# Patient Record
Sex: MALE | Race: WHITE | NOT HISPANIC OR LATINO | URBAN - METROPOLITAN AREA
[De-identification: names, ages, dates, MRNs, and addresses within clinical notes are randomized per-mention and may not be internally consistent; named-entity substitution may affect disease eponyms.]

---

## 2018-12-16 ENCOUNTER — INPATIENT (INPATIENT)
Facility: HOSPITAL | Age: 73
LOS: 6 days | Discharge: HOME | DRG: 871 | End: 2018-12-23
Attending: SURGERY | Admitting: SURGERY
Payer: MEDICARE

## 2018-12-16 VITALS
HEIGHT: 70 IN | TEMPERATURE: 98 F | HEART RATE: 82 BPM | OXYGEN SATURATION: 96 % | WEIGHT: 199.96 LBS | RESPIRATION RATE: 18 BRPM | SYSTOLIC BLOOD PRESSURE: 117 MMHG | DIASTOLIC BLOOD PRESSURE: 74 MMHG

## 2018-12-16 DIAGNOSIS — Z95.1 PRESENCE OF AORTOCORONARY BYPASS GRAFT: Chronic | ICD-10-CM

## 2018-12-16 DIAGNOSIS — Z90.49 ACQUIRED ABSENCE OF OTHER SPECIFIED PARTS OF DIGESTIVE TRACT: Chronic | ICD-10-CM

## 2018-12-16 DIAGNOSIS — K81.9 CHOLECYSTITIS, UNSPECIFIED: ICD-10-CM

## 2018-12-16 LAB
ALBUMIN SERPL ELPH-MCNC: 3.8 G/DL — SIGNIFICANT CHANGE UP (ref 3.3–5)
ALP SERPL-CCNC: 107 U/L — SIGNIFICANT CHANGE UP (ref 40–120)
ALT FLD-CCNC: 27 U/L — SIGNIFICANT CHANGE UP (ref 10–45)
ANION GAP SERPL CALC-SCNC: 17 MMOL/L — SIGNIFICANT CHANGE UP (ref 5–17)
APPEARANCE UR: CLEAR — SIGNIFICANT CHANGE UP
APTT BLD: 27 SEC — LOW (ref 27.5–36.3)
AST SERPL-CCNC: 24 U/L — SIGNIFICANT CHANGE UP (ref 10–40)
BACTERIA # UR AUTO: NEGATIVE — SIGNIFICANT CHANGE UP
BASE EXCESS BLDV CALC-SCNC: 1.2 MMOL/L — SIGNIFICANT CHANGE UP (ref -2–2)
BASOPHILS # BLD AUTO: 0.02 K/UL — SIGNIFICANT CHANGE UP (ref 0–0.2)
BASOPHILS NFR BLD AUTO: 0.1 % — SIGNIFICANT CHANGE UP (ref 0–2)
BILIRUB SERPL-MCNC: 0.7 MG/DL — SIGNIFICANT CHANGE UP (ref 0.2–1.2)
BILIRUB UR-MCNC: NEGATIVE — SIGNIFICANT CHANGE UP
BLD GP AB SCN SERPL QL: NEGATIVE — SIGNIFICANT CHANGE UP
BUN SERPL-MCNC: 10 MG/DL — SIGNIFICANT CHANGE UP (ref 7–23)
CA-I SERPL-SCNC: 1.13 MMOL/L — SIGNIFICANT CHANGE UP (ref 1.12–1.3)
CALCIUM SERPL-MCNC: 9.2 MG/DL — SIGNIFICANT CHANGE UP (ref 8.4–10.5)
CHLORIDE BLDV-SCNC: 103 MMOL/L — SIGNIFICANT CHANGE UP (ref 96–108)
CHLORIDE SERPL-SCNC: 98 MMOL/L — SIGNIFICANT CHANGE UP (ref 96–108)
CO2 BLDV-SCNC: 27 MMOL/L — SIGNIFICANT CHANGE UP (ref 22–30)
CO2 SERPL-SCNC: 21 MMOL/L — LOW (ref 22–31)
COLOR SPEC: SIGNIFICANT CHANGE UP
CREAT SERPL-MCNC: 1.1 MG/DL — SIGNIFICANT CHANGE UP (ref 0.5–1.3)
DIFF PNL FLD: NEGATIVE — SIGNIFICANT CHANGE UP
EOSINOPHIL # BLD AUTO: 0.03 K/UL — SIGNIFICANT CHANGE UP (ref 0–0.5)
EOSINOPHIL NFR BLD AUTO: 0.2 % — SIGNIFICANT CHANGE UP (ref 0–6)
EPI CELLS # UR: 0 /HPF — SIGNIFICANT CHANGE UP
GAS PNL BLDV: 132 MMOL/L — LOW (ref 136–145)
GAS PNL BLDV: SIGNIFICANT CHANGE UP
GLUCOSE BLDC GLUCOMTR-MCNC: 116 MG/DL — HIGH (ref 70–99)
GLUCOSE BLDV-MCNC: 115 MG/DL — HIGH (ref 70–99)
GLUCOSE SERPL-MCNC: 123 MG/DL — HIGH (ref 70–99)
GLUCOSE UR QL: NEGATIVE — SIGNIFICANT CHANGE UP
HCO3 BLDV-SCNC: 26 MMOL/L — SIGNIFICANT CHANGE UP (ref 21–29)
HCT VFR BLD CALC: 42.7 % — SIGNIFICANT CHANGE UP (ref 39–50)
HCT VFR BLDA CALC: 48 % — SIGNIFICANT CHANGE UP (ref 39–50)
HGB BLD CALC-MCNC: 15.8 G/DL — SIGNIFICANT CHANGE UP (ref 13–17)
HGB BLD-MCNC: 15 G/DL — SIGNIFICANT CHANGE UP (ref 13–17)
HYALINE CASTS # UR AUTO: 0 /LPF — SIGNIFICANT CHANGE UP (ref 0–2)
IMM GRANULOCYTES NFR BLD AUTO: 0.4 % — SIGNIFICANT CHANGE UP (ref 0–1.5)
INR BLD: 1.11 RATIO — SIGNIFICANT CHANGE UP (ref 0.88–1.16)
KETONES UR-MCNC: NEGATIVE — SIGNIFICANT CHANGE UP
LACTATE BLDV-MCNC: 2.7 MMOL/L — HIGH (ref 0.7–2)
LEUKOCYTE ESTERASE UR-ACNC: NEGATIVE — SIGNIFICANT CHANGE UP
LIDOCAIN IGE QN: 33 U/L — SIGNIFICANT CHANGE UP (ref 7–60)
LYMPHOCYTES # BLD AUTO: 1.1 K/UL — SIGNIFICANT CHANGE UP (ref 1–3.3)
LYMPHOCYTES # BLD AUTO: 5.6 % — LOW (ref 13–44)
MCHC RBC-ENTMCNC: 29.5 PG — SIGNIFICANT CHANGE UP (ref 27–34)
MCHC RBC-ENTMCNC: 35.1 GM/DL — SIGNIFICANT CHANGE UP (ref 32–36)
MCV RBC AUTO: 84.1 FL — SIGNIFICANT CHANGE UP (ref 80–100)
MONOCYTES # BLD AUTO: 0.92 K/UL — HIGH (ref 0–0.9)
MONOCYTES NFR BLD AUTO: 4.7 % — SIGNIFICANT CHANGE UP (ref 2–14)
NEUTROPHILS # BLD AUTO: 17.45 K/UL — HIGH (ref 1.8–7.4)
NEUTROPHILS NFR BLD AUTO: 89 % — HIGH (ref 43–77)
NITRITE UR-MCNC: NEGATIVE — SIGNIFICANT CHANGE UP
OTHER CELLS CSF MANUAL: 4 ML/DL — LOW (ref 18–22)
PCO2 BLDV: 42 MMHG — SIGNIFICANT CHANGE UP (ref 35–50)
PH BLDV: 7.41 — SIGNIFICANT CHANGE UP (ref 7.35–7.45)
PH UR: 7 — SIGNIFICANT CHANGE UP (ref 5–8)
PLATELET # BLD AUTO: 324 K/UL — SIGNIFICANT CHANGE UP (ref 150–400)
PO2 BLDV: <20 MMHG — LOW (ref 25–45)
POTASSIUM BLDV-SCNC: 3.5 MMOL/L — SIGNIFICANT CHANGE UP (ref 3.5–5.3)
POTASSIUM SERPL-MCNC: 4.1 MMOL/L — SIGNIFICANT CHANGE UP (ref 3.5–5.3)
POTASSIUM SERPL-SCNC: 4.1 MMOL/L — SIGNIFICANT CHANGE UP (ref 3.5–5.3)
PROT SERPL-MCNC: 7.9 G/DL — SIGNIFICANT CHANGE UP (ref 6–8.3)
PROT UR-MCNC: NEGATIVE — SIGNIFICANT CHANGE UP
PROTHROM AB SERPL-ACNC: 12.8 SEC — SIGNIFICANT CHANGE UP (ref 10–12.9)
RBC # BLD: 5.08 M/UL — SIGNIFICANT CHANGE UP (ref 4.2–5.8)
RBC # FLD: 13.6 % — SIGNIFICANT CHANGE UP (ref 10.3–14.5)
RBC CASTS # UR COMP ASSIST: 1 /HPF — SIGNIFICANT CHANGE UP (ref 0–4)
RH IG SCN BLD-IMP: POSITIVE — SIGNIFICANT CHANGE UP
RH IG SCN BLD-IMP: POSITIVE — SIGNIFICANT CHANGE UP
SAO2 % BLDV: 20 % — LOW (ref 67–88)
SODIUM SERPL-SCNC: 136 MMOL/L — SIGNIFICANT CHANGE UP (ref 135–145)
SP GR SPEC: 1.02 — SIGNIFICANT CHANGE UP (ref 1.01–1.02)
UROBILINOGEN FLD QL: NEGATIVE — SIGNIFICANT CHANGE UP
WBC # BLD: 19.59 K/UL — HIGH (ref 3.8–10.5)
WBC # FLD AUTO: 19.59 K/UL — HIGH (ref 3.8–10.5)
WBC UR QL: 0 /HPF — SIGNIFICANT CHANGE UP (ref 0–5)

## 2018-12-16 PROCEDURE — 76705 ECHO EXAM OF ABDOMEN: CPT | Mod: 26

## 2018-12-16 PROCEDURE — 71045 X-RAY EXAM CHEST 1 VIEW: CPT | Mod: 26

## 2018-12-16 PROCEDURE — 99222 1ST HOSP IP/OBS MODERATE 55: CPT

## 2018-12-16 PROCEDURE — 99285 EMERGENCY DEPT VISIT HI MDM: CPT | Mod: 25,GC

## 2018-12-16 PROCEDURE — 31500 INSERT EMERGENCY AIRWAY: CPT | Mod: GC

## 2018-12-16 PROCEDURE — 76705 ECHO EXAM OF ABDOMEN: CPT | Mod: 26,RT

## 2018-12-16 RX ORDER — SODIUM CHLORIDE 9 MG/ML
500 INJECTION INTRAMUSCULAR; INTRAVENOUS; SUBCUTANEOUS ONCE
Qty: 0 | Refills: 0 | Status: COMPLETED | OUTPATIENT
Start: 2018-12-16 | End: 2018-12-16

## 2018-12-16 RX ORDER — HEPARIN SODIUM 5000 [USP'U]/ML
5000 INJECTION INTRAVENOUS; SUBCUTANEOUS EVERY 8 HOURS
Qty: 0 | Refills: 0 | Status: DISCONTINUED | OUTPATIENT
Start: 2018-12-16 | End: 2018-12-18

## 2018-12-16 RX ORDER — ACETAMINOPHEN 500 MG
650 TABLET ORAL EVERY 6 HOURS
Qty: 0 | Refills: 0 | Status: DISCONTINUED | OUTPATIENT
Start: 2018-12-16 | End: 2018-12-17

## 2018-12-16 RX ORDER — DEXTROSE 50 % IN WATER 50 %
15 SYRINGE (ML) INTRAVENOUS ONCE
Qty: 0 | Refills: 0 | Status: DISCONTINUED | OUTPATIENT
Start: 2018-12-16 | End: 2018-12-17

## 2018-12-16 RX ORDER — GLUCAGON INJECTION, SOLUTION 0.5 MG/.1ML
1 INJECTION, SOLUTION SUBCUTANEOUS ONCE
Qty: 0 | Refills: 0 | Status: DISCONTINUED | OUTPATIENT
Start: 2018-12-16 | End: 2018-12-17

## 2018-12-16 RX ORDER — DEXTROSE 50 % IN WATER 50 %
25 SYRINGE (ML) INTRAVENOUS ONCE
Qty: 0 | Refills: 0 | Status: DISCONTINUED | OUTPATIENT
Start: 2018-12-16 | End: 2018-12-17

## 2018-12-16 RX ORDER — SODIUM CHLORIDE 9 MG/ML
1000 INJECTION, SOLUTION INTRAVENOUS ONCE
Qty: 0 | Refills: 0 | Status: COMPLETED | OUTPATIENT
Start: 2018-12-16 | End: 2018-12-16

## 2018-12-16 RX ORDER — DEXTROSE 50 % IN WATER 50 %
12.5 SYRINGE (ML) INTRAVENOUS ONCE
Qty: 0 | Refills: 0 | Status: DISCONTINUED | OUTPATIENT
Start: 2018-12-16 | End: 2018-12-17

## 2018-12-16 RX ORDER — PIPERACILLIN AND TAZOBACTAM 4; .5 G/20ML; G/20ML
3.38 INJECTION, POWDER, LYOPHILIZED, FOR SOLUTION INTRAVENOUS EVERY 8 HOURS
Qty: 0 | Refills: 0 | Status: DISCONTINUED | OUTPATIENT
Start: 2018-12-16 | End: 2018-12-20

## 2018-12-16 RX ORDER — INSULIN LISPRO 100/ML
VIAL (ML) SUBCUTANEOUS EVERY 6 HOURS
Qty: 0 | Refills: 0 | Status: DISCONTINUED | OUTPATIENT
Start: 2018-12-16 | End: 2018-12-20

## 2018-12-16 RX ORDER — METOPROLOL TARTRATE 50 MG
5 TABLET ORAL EVERY 6 HOURS
Qty: 0 | Refills: 0 | Status: DISCONTINUED | OUTPATIENT
Start: 2018-12-16 | End: 2018-12-17

## 2018-12-16 RX ORDER — PIPERACILLIN AND TAZOBACTAM 4; .5 G/20ML; G/20ML
3.38 INJECTION, POWDER, LYOPHILIZED, FOR SOLUTION INTRAVENOUS ONCE
Qty: 0 | Refills: 0 | Status: COMPLETED | OUTPATIENT
Start: 2018-12-16 | End: 2018-12-16

## 2018-12-16 RX ORDER — SODIUM CHLORIDE 9 MG/ML
1000 INJECTION, SOLUTION INTRAVENOUS
Qty: 0 | Refills: 0 | Status: DISCONTINUED | OUTPATIENT
Start: 2018-12-16 | End: 2018-12-18

## 2018-12-16 RX ORDER — SODIUM CHLORIDE 9 MG/ML
1000 INJECTION, SOLUTION INTRAVENOUS
Qty: 0 | Refills: 0 | Status: DISCONTINUED | OUTPATIENT
Start: 2018-12-16 | End: 2018-12-17

## 2018-12-16 RX ADMIN — PIPERACILLIN AND TAZOBACTAM 25 GRAM(S): 4; .5 INJECTION, POWDER, LYOPHILIZED, FOR SOLUTION INTRAVENOUS at 22:50

## 2018-12-16 RX ADMIN — SODIUM CHLORIDE 500 MILLILITER(S): 9 INJECTION INTRAMUSCULAR; INTRAVENOUS; SUBCUTANEOUS at 13:17

## 2018-12-16 RX ADMIN — SODIUM CHLORIDE 1000 MILLILITER(S): 9 INJECTION, SOLUTION INTRAVENOUS at 14:59

## 2018-12-16 RX ADMIN — PIPERACILLIN AND TAZOBACTAM 3.38 GRAM(S): 4; .5 INJECTION, POWDER, LYOPHILIZED, FOR SOLUTION INTRAVENOUS at 14:59

## 2018-12-16 RX ADMIN — PIPERACILLIN AND TAZOBACTAM 200 GRAM(S): 4; .5 INJECTION, POWDER, LYOPHILIZED, FOR SOLUTION INTRAVENOUS at 14:02

## 2018-12-16 RX ADMIN — HEPARIN SODIUM 5000 UNIT(S): 5000 INJECTION INTRAVENOUS; SUBCUTANEOUS at 22:51

## 2018-12-16 RX ADMIN — Medication 650 MILLIGRAM(S): at 19:55

## 2018-12-16 RX ADMIN — SODIUM CHLORIDE 500 MILLILITER(S): 9 INJECTION INTRAMUSCULAR; INTRAVENOUS; SUBCUTANEOUS at 14:10

## 2018-12-16 RX ADMIN — Medication 650 MILLIGRAM(S): at 14:12

## 2018-12-16 RX ADMIN — Medication 650 MILLIGRAM(S): at 14:09

## 2018-12-16 RX ADMIN — SODIUM CHLORIDE 1000 MILLILITER(S): 9 INJECTION, SOLUTION INTRAVENOUS at 14:10

## 2018-12-16 NOTE — H&P ADULT - ASSESSMENT
73M w/ acute cholecystitis    - admit to [facility]  - Zosyn  - NPO  - judicious fluid resuscitation  - holding ASA/Plavix/Pletal  - will call patient's cardiologist Pedro Goldsmith [phone] to assess for safety of holding antiplatelet meds  - pain control  - hold losartan given sepsis will treat HTN as it arises  - OR plan depends on cardiology and response to abx  - d/w Dr. Gorge Balderas MD  [facility] [id number]

## 2018-12-16 NOTE — ED PROVIDER NOTE - PHYSICAL EXAMINATION
Attending Shelby Ojeda: Gen: NAD, heent: atrauamtic, eomi, perrla, mmm, op pink, uvula midline, neck; nttp, no nuchal rigidity, chest: nttp, no crepitus, cv: rrr, no murmurs, lungs: ctab, abd: soft, ttp RUQ +murphys, no peritoneal signs, +BS, no guarding, ext: wwp, neg homans, skin: no rash, neuro: awake and alert, following commands, speech clear, sensation and strength intact, no focal deficits

## 2018-12-16 NOTE — H&P ADULT - NSHPPHYSICALEXAM_GEN_ALL_CORE
NAD, awake and alert  No rashes  No jaundice or scleral icterus  Respirations nonlabored  CV Regular  Sternal incision well healed  Abdomen soft, tender RUQ, nondistended  No guarding or rebound tenderness  - Bhatti's sign  Extremities warm

## 2018-12-16 NOTE — ED ADULT NURSE NOTE - OBJECTIVE STATEMENT
pt seen at [facility] for RUQ pain with no findings comes to the ed with daughter Pt  speaks [place] and interpreted via daughter Pt has fever at home and here 100.7 oral temp/  US done at bedside by DR Ojeda.  IVL placed and bloods sent as ordered.

## 2018-12-16 NOTE — H&P ADULT - ATTENDING COMMENTS
Patient seen and examined and agree with above.  73 year old with past medical history of CABG, peripheral vascular disease, HTN, DM, HLD who presents with RUQ pain and diagnosed with acute cholecystitis. WBC 19.5  The patient is on Plavix, aspirin and cilostazol.  I have reviewed PMH, PSH, meds, labs and imaging.   Vs: T 100.2  /59  HR 87  On physical exam he is mildly tender in the RUQ with no Bhatti's sign.  There are no signs of peritonitis.  RUQ ultrasound demonstrates: Distended gallbladder with the gallbladder wall measuring   approximately 4 mm and sludge within the gallbladder lumen. Trace pericholecystic fluid.    I have recommended a laparoscopic cholecystectomy but given that the patient is not septic from this and is on multiple antiplatelet medication (plavix last taken today) he is very high risk of intraoperative and postoperative bleeding. I discussed his care with his cardiologist who states that on last TTE he has normal LV function. It is preferable not to give platelets if not necessary. I discussed with the patient and his daughter that we will start with NPO and antibtiotics and see how is condition improves over the next 24-48 hours to determine whether we will undergo a laparoscopic cholecystectomy at this time.  Patient is to be admitted to [facility].  Will hold aspirin and plavix.  NPO with IVF  Repeat labs in AM  Serial abdominal exam  Pain control.

## 2018-12-16 NOTE — ED PROVIDER NOTE - ATTENDING CONTRIBUTION TO CARE
Attending MD Shelby Ojeda:  I personally have seen and examined this patient.  Resident note reviewed and agree on plan of care and except where noted.  See HPI, PE, and MDM for details.

## 2018-12-16 NOTE — H&P ADULT - NSHPLABSRESULTS_GEN_ALL_CORE
15.0   19.59 )-----------( 324      ( 16 Dec 2018 13:01 )             42.7     12-16    136  |  98  |  10  ----------------------------<  123<H>  4.1   |  21<L>  |  1.10    Ca    9.2      16 Dec 2018 13:01    TPro  7.9  /  Alb  3.8  /  TBili  0.7  /  DBili  x   /  AST  24  /  ALT  27  /  AlkPhos  107  12-16    < from: US Abdomen Upper Quadrant Right (12.16.18 @ 16:15) >    FINDINGS:    Liver: Hepatic steatosis.    Bile ducts: Normal caliber. Common bile duct measures 5 mm.     Gallbladder: Distended gallbladder with the gallbladder wall measuring   approximately 4 mm and sludge within the gallbladder lumen. No discrete   stones are seen. Trace pericholecystic fluid. It was not reported whether   or not the sonographic Bhatti sign was present.    Pancreas: Visualized portions are within normal limits.    Right kidney: 11.6 cm. No hydronephrosis.    Ascites: None.    Aorta/IVC: Visualized portions are within normal limits.    IMPRESSION:     Findings suspicious for acute cholecystitis. Correlate clinically. This   could be confirmed with HIDA scan as needed.    Hepatic steatosis.    These findings were discussed with Dr. MAYRA BURK at 12/16/2018 4:23   PM by Dr. Marifer Nixon of Radiology with read back confirmation.       < end of copied text >

## 2018-12-16 NOTE — ED PROVIDER NOTE - OBJECTIVE STATEMENT
Attending Shelby Ojeda: 74 y/o male h/o CAD, s/p appendectomy  presenting with RUQ pain. per family pt was seen at outside hospital 2 weeks ago for similar and had CT scan and RUQ u/s which were read as normal, pt's family member has reports with her. today began with acute right sided abdominal pain. pain associated with nausea and rigors. per family felt warm but did not take his temperature. did have diarrhea a few days ago but not recently. no dysuria. no back pain.

## 2018-12-16 NOTE — H&P ADULT - HISTORY OF PRESENT ILLNESS
73M hx of remote appendectomy, CABGx4 2016, HTN, DM, HLD who presents with abdominal pain since 2AM. The pain is worst in RUQ. He endorses nausea but no vomiting. He is having chills. He had a similar episode 2 weeks ago and was worked up without avail. Last PO this AM at 10, just a small piece of bread. He denies CP/SOB/palpitations. He took Plavix this AM, but takes ASA at night.  Vital Signs Last 24 Hrs  T(C): 38.1 (16 Dec 2018 18:13), Max: 38.3 (16 Dec 2018 16:51)  T(F): 100.6 (16 Dec 2018 18:13), Max: 100.9 (16 Dec 2018 16:51)  HR: 89 (16 Dec 2018 18:13) (82 - 89)  BP: 115/69 (16 Dec 2018 18:13) (105/69 - 117/74)  BP(mean): --  RR: 18 (16 Dec 2018 18:13) (17 - 18)  SpO2: 94% (16 Dec 2018 18:13) (94% - 96%)

## 2018-12-16 NOTE — ED ADULT NURSE NOTE - NSFALLRSKINDICATORS_ED_ALL_ED
How Severe Is It?: moderate Is This A New Presentation, Or A Follow-Up?: Rash Additional History: Patient states she has been using selsun blue. Patient states she leaves it on for 5 min. Patient uses a hair oil and coconut oil. no

## 2018-12-16 NOTE — ED PROVIDER NOTE - MEDICAL DECISION MAKING DETAILS
Attending Shelby Ojeda: 74 y/o male presenting with RUQ pain. pocus shows distended gallbladder with sludge and sonographic muprhys concerning for acute cholecystitis. pt did have recent CT scan making tumofactive sludge less likely. will obtain labs, dw surgery. likely admit

## 2018-12-16 NOTE — ED ADULT NURSE REASSESSMENT NOTE - NS ED NURSE REASSESS COMMENT FT1
1700 Pt temp 100.9 unable to give Tylenol last had dose was at 1400 Daughter does not want pt to have any Motrin for he is on blood thinners already explained one katlyn dose is not going sto hurt pot Pt is rigoring in the hallway fluids infusing pending surgery consult Soraya

## 2018-12-17 LAB
ALBUMIN SERPL ELPH-MCNC: 2.9 G/DL — LOW (ref 3.3–5)
ALBUMIN SERPL ELPH-MCNC: 3 G/DL — LOW (ref 3.3–5)
ALBUMIN SERPL ELPH-MCNC: 3.3 G/DL — SIGNIFICANT CHANGE UP (ref 3.3–5)
ALBUMIN SERPL ELPH-MCNC: 3.4 G/DL — SIGNIFICANT CHANGE UP (ref 3.3–5)
ALBUMIN SERPL ELPH-MCNC: 3.8 G/DL — SIGNIFICANT CHANGE UP (ref 3.3–5)
ALP SERPL-CCNC: 103 U/L — SIGNIFICANT CHANGE UP (ref 40–120)
ALP SERPL-CCNC: 118 U/L — SIGNIFICANT CHANGE UP (ref 40–120)
ALP SERPL-CCNC: 127 U/L — HIGH (ref 40–120)
ALP SERPL-CCNC: 130 U/L — HIGH (ref 40–120)
ALP SERPL-CCNC: 131 U/L — HIGH (ref 40–120)
ALT FLD-CCNC: 116 U/L — HIGH (ref 10–45)
ALT FLD-CCNC: 165 U/L — HIGH (ref 10–45)
ALT FLD-CCNC: 213 U/L — HIGH (ref 10–45)
ALT FLD-CCNC: 30 U/L — SIGNIFICANT CHANGE UP (ref 10–45)
ALT FLD-CCNC: 66 U/L — HIGH (ref 10–45)
ANION GAP SERPL CALC-SCNC: 16 MMOL/L — SIGNIFICANT CHANGE UP (ref 5–17)
ANION GAP SERPL CALC-SCNC: 17 MMOL/L — SIGNIFICANT CHANGE UP (ref 5–17)
ANION GAP SERPL CALC-SCNC: 18 MMOL/L — HIGH (ref 5–17)
ANION GAP SERPL CALC-SCNC: 21 MMOL/L — HIGH (ref 5–17)
ANION GAP SERPL CALC-SCNC: 25 MMOL/L — HIGH (ref 5–17)
APTT BLD: 26.5 SEC — LOW (ref 27.5–36.3)
AST SERPL-CCNC: 132 U/L — HIGH (ref 10–40)
AST SERPL-CCNC: 200 U/L — HIGH (ref 10–40)
AST SERPL-CCNC: 223 U/L — HIGH (ref 10–40)
AST SERPL-CCNC: 25 U/L — SIGNIFICANT CHANGE UP (ref 10–40)
AST SERPL-CCNC: 84 U/L — HIGH (ref 10–40)
BASE EXCESS BLDV CALC-SCNC: -4.2 MMOL/L — LOW (ref -2–2)
BILIRUB DIRECT SERPL-MCNC: 0.2 MG/DL — SIGNIFICANT CHANGE UP (ref 0–0.2)
BILIRUB DIRECT SERPL-MCNC: 0.3 MG/DL — HIGH (ref 0–0.2)
BILIRUB DIRECT SERPL-MCNC: 0.3 MG/DL — HIGH (ref 0–0.2)
BILIRUB DIRECT SERPL-MCNC: <0.1 MG/DL — SIGNIFICANT CHANGE UP (ref 0–0.2)
BILIRUB INDIRECT FLD-MCNC: 0.2 MG/DL — SIGNIFICANT CHANGE UP (ref 0.2–1)
BILIRUB INDIRECT FLD-MCNC: 0.3 MG/DL — SIGNIFICANT CHANGE UP (ref 0.2–1)
BILIRUB INDIRECT FLD-MCNC: 0.5 MG/DL — SIGNIFICANT CHANGE UP (ref 0.2–1)
BILIRUB INDIRECT FLD-MCNC: >0.7 MG/DL — SIGNIFICANT CHANGE UP (ref 0.2–1)
BILIRUB SERPL-MCNC: 0.5 MG/DL — SIGNIFICANT CHANGE UP (ref 0.2–1.2)
BILIRUB SERPL-MCNC: 0.6 MG/DL — SIGNIFICANT CHANGE UP (ref 0.2–1.2)
BILIRUB SERPL-MCNC: 0.7 MG/DL — SIGNIFICANT CHANGE UP (ref 0.2–1.2)
BILIRUB SERPL-MCNC: 0.7 MG/DL — SIGNIFICANT CHANGE UP (ref 0.2–1.2)
BILIRUB SERPL-MCNC: 0.8 MG/DL — SIGNIFICANT CHANGE UP (ref 0.2–1.2)
BUN SERPL-MCNC: 10 MG/DL — SIGNIFICANT CHANGE UP (ref 7–23)
BUN SERPL-MCNC: 12 MG/DL — SIGNIFICANT CHANGE UP (ref 7–23)
BUN SERPL-MCNC: 14 MG/DL — SIGNIFICANT CHANGE UP (ref 7–23)
BUN SERPL-MCNC: 15 MG/DL — SIGNIFICANT CHANGE UP (ref 7–23)
BUN SERPL-MCNC: 16 MG/DL — SIGNIFICANT CHANGE UP (ref 7–23)
C DIFF GDH STL QL: NEGATIVE — SIGNIFICANT CHANGE UP
C DIFF GDH STL QL: SIGNIFICANT CHANGE UP
CALCIUM SERPL-MCNC: 7.9 MG/DL — LOW (ref 8.4–10.5)
CALCIUM SERPL-MCNC: 8.4 MG/DL — SIGNIFICANT CHANGE UP (ref 8.4–10.5)
CALCIUM SERPL-MCNC: 8.5 MG/DL — SIGNIFICANT CHANGE UP (ref 8.4–10.5)
CALCIUM SERPL-MCNC: 9.2 MG/DL — SIGNIFICANT CHANGE UP (ref 8.4–10.5)
CALCIUM SERPL-MCNC: 9.3 MG/DL — SIGNIFICANT CHANGE UP (ref 8.4–10.5)
CHLORIDE SERPL-SCNC: 101 MMOL/L — SIGNIFICANT CHANGE UP (ref 96–108)
CHLORIDE SERPL-SCNC: 101 MMOL/L — SIGNIFICANT CHANGE UP (ref 96–108)
CHLORIDE SERPL-SCNC: 102 MMOL/L — SIGNIFICANT CHANGE UP (ref 96–108)
CHLORIDE SERPL-SCNC: 104 MMOL/L — SIGNIFICANT CHANGE UP (ref 96–108)
CHLORIDE SERPL-SCNC: 99 MMOL/L — SIGNIFICANT CHANGE UP (ref 96–108)
CK MB BLD-MCNC: 0.7 % — SIGNIFICANT CHANGE UP (ref 0–3.5)
CK MB CFR SERPL CALC: 2.2 NG/ML — SIGNIFICANT CHANGE UP (ref 0–6.7)
CK SERPL-CCNC: 319 U/L — HIGH (ref 30–200)
CO2 BLDV-SCNC: 22 MMOL/L — SIGNIFICANT CHANGE UP (ref 22–30)
CO2 SERPL-SCNC: 11 MMOL/L — LOW (ref 22–31)
CO2 SERPL-SCNC: 14 MMOL/L — LOW (ref 22–31)
CO2 SERPL-SCNC: 15 MMOL/L — LOW (ref 22–31)
CO2 SERPL-SCNC: 16 MMOL/L — LOW (ref 22–31)
CO2 SERPL-SCNC: 22 MMOL/L — SIGNIFICANT CHANGE UP (ref 22–31)
CREAT SERPL-MCNC: 1.39 MG/DL — HIGH (ref 0.5–1.3)
CREAT SERPL-MCNC: 1.42 MG/DL — HIGH (ref 0.5–1.3)
CREAT SERPL-MCNC: 1.46 MG/DL — HIGH (ref 0.5–1.3)
CREAT SERPL-MCNC: 1.6 MG/DL — HIGH (ref 0.5–1.3)
CREAT SERPL-MCNC: 1.78 MG/DL — HIGH (ref 0.5–1.3)
CULTURE RESULTS: SIGNIFICANT CHANGE UP
GAS PNL BLDA: SIGNIFICANT CHANGE UP
GAS PNL BLDV: SIGNIFICANT CHANGE UP
GLUCOSE BLDC GLUCOMTR-MCNC: 105 MG/DL — HIGH (ref 70–99)
GLUCOSE BLDC GLUCOMTR-MCNC: 128 MG/DL — HIGH (ref 70–99)
GLUCOSE BLDC GLUCOMTR-MCNC: 88 MG/DL — SIGNIFICANT CHANGE UP (ref 70–99)
GLUCOSE SERPL-MCNC: 108 MG/DL — HIGH (ref 70–99)
GLUCOSE SERPL-MCNC: 121 MG/DL — HIGH (ref 70–99)
GLUCOSE SERPL-MCNC: 128 MG/DL — HIGH (ref 70–99)
GLUCOSE SERPL-MCNC: 150 MG/DL — HIGH (ref 70–99)
GLUCOSE SERPL-MCNC: 171 MG/DL — HIGH (ref 70–99)
HBA1C BLD-MCNC: 6.3 % — HIGH (ref 4–5.6)
HCO3 BLDV-SCNC: 20 MMOL/L — LOW (ref 21–29)
HCT VFR BLD CALC: 43.1 % — SIGNIFICANT CHANGE UP (ref 39–50)
HCT VFR BLD CALC: 43.3 % — SIGNIFICANT CHANGE UP (ref 39–50)
HCT VFR BLD CALC: 43.4 % — SIGNIFICANT CHANGE UP (ref 39–50)
HCT VFR BLD CALC: 46.9 % — SIGNIFICANT CHANGE UP (ref 39–50)
HCT VFR BLD CALC: 47.5 % — SIGNIFICANT CHANGE UP (ref 39–50)
HGB BLD-MCNC: 14.5 G/DL — SIGNIFICANT CHANGE UP (ref 13–17)
HGB BLD-MCNC: 14.6 G/DL — SIGNIFICANT CHANGE UP (ref 13–17)
HGB BLD-MCNC: 14.7 G/DL — SIGNIFICANT CHANGE UP (ref 13–17)
HGB BLD-MCNC: 16 G/DL — SIGNIFICANT CHANGE UP (ref 13–17)
HGB BLD-MCNC: 16.3 G/DL — SIGNIFICANT CHANGE UP (ref 13–17)
HOROWITZ INDEX BLDV+IHG-RTO: 40 — SIGNIFICANT CHANGE UP
INR BLD: 1.41 RATIO — HIGH (ref 0.88–1.16)
LIDOCAIN IGE QN: 26 U/L — SIGNIFICANT CHANGE UP (ref 7–60)
MAGNESIUM SERPL-MCNC: 1.6 MG/DL — SIGNIFICANT CHANGE UP (ref 1.6–2.6)
MAGNESIUM SERPL-MCNC: 1.6 MG/DL — SIGNIFICANT CHANGE UP (ref 1.6–2.6)
MAGNESIUM SERPL-MCNC: 1.7 MG/DL — SIGNIFICANT CHANGE UP (ref 1.6–2.6)
MAGNESIUM SERPL-MCNC: 1.8 MG/DL — SIGNIFICANT CHANGE UP (ref 1.6–2.6)
MCHC RBC-ENTMCNC: 29.1 PG — SIGNIFICANT CHANGE UP (ref 27–34)
MCHC RBC-ENTMCNC: 29.4 PG — SIGNIFICANT CHANGE UP (ref 27–34)
MCHC RBC-ENTMCNC: 29.5 PG — SIGNIFICANT CHANGE UP (ref 27–34)
MCHC RBC-ENTMCNC: 29.7 PG — SIGNIFICANT CHANGE UP (ref 27–34)
MCHC RBC-ENTMCNC: 29.8 PG — SIGNIFICANT CHANGE UP (ref 27–34)
MCHC RBC-ENTMCNC: 33.3 GM/DL — SIGNIFICANT CHANGE UP (ref 32–36)
MCHC RBC-ENTMCNC: 33.8 GM/DL — SIGNIFICANT CHANGE UP (ref 32–36)
MCHC RBC-ENTMCNC: 34 GM/DL — SIGNIFICANT CHANGE UP (ref 32–36)
MCHC RBC-ENTMCNC: 34.1 GM/DL — SIGNIFICANT CHANGE UP (ref 32–36)
MCHC RBC-ENTMCNC: 34.3 GM/DL — SIGNIFICANT CHANGE UP (ref 32–36)
MCV RBC AUTO: 86.7 FL — SIGNIFICANT CHANGE UP (ref 80–100)
MCV RBC AUTO: 87 FL — SIGNIFICANT CHANGE UP (ref 80–100)
MCV RBC AUTO: 87.1 FL — SIGNIFICANT CHANGE UP (ref 80–100)
MCV RBC AUTO: 87.2 FL — SIGNIFICANT CHANGE UP (ref 80–100)
MCV RBC AUTO: 87.3 FL — SIGNIFICANT CHANGE UP (ref 80–100)
PCO2 BLDV: 38 MMHG — SIGNIFICANT CHANGE UP (ref 35–50)
PH BLDV: 7.35 — SIGNIFICANT CHANGE UP (ref 7.35–7.45)
PHOSPHATE SERPL-MCNC: 1 MG/DL — CRITICAL LOW (ref 2.5–4.5)
PHOSPHATE SERPL-MCNC: 2.5 MG/DL — SIGNIFICANT CHANGE UP (ref 2.5–4.5)
PHOSPHATE SERPL-MCNC: 3.1 MG/DL — SIGNIFICANT CHANGE UP (ref 2.5–4.5)
PHOSPHATE SERPL-MCNC: 3.9 MG/DL — SIGNIFICANT CHANGE UP (ref 2.5–4.5)
PLATELET # BLD AUTO: 139 K/UL — LOW (ref 150–400)
PLATELET # BLD AUTO: 142 K/UL — LOW (ref 150–400)
PLATELET # BLD AUTO: 156 K/UL — SIGNIFICANT CHANGE UP (ref 150–400)
PLATELET # BLD AUTO: 157 K/UL — SIGNIFICANT CHANGE UP (ref 150–400)
PLATELET # BLD AUTO: 229 K/UL — SIGNIFICANT CHANGE UP (ref 150–400)
PO2 BLDV: 43 MMHG — SIGNIFICANT CHANGE UP (ref 25–45)
POTASSIUM SERPL-MCNC: 2.9 MMOL/L — CRITICAL LOW (ref 3.5–5.3)
POTASSIUM SERPL-MCNC: 3 MMOL/L — LOW (ref 3.5–5.3)
POTASSIUM SERPL-MCNC: 3.9 MMOL/L — SIGNIFICANT CHANGE UP (ref 3.5–5.3)
POTASSIUM SERPL-MCNC: 4.1 MMOL/L — SIGNIFICANT CHANGE UP (ref 3.5–5.3)
POTASSIUM SERPL-MCNC: 5.2 MMOL/L — SIGNIFICANT CHANGE UP (ref 3.5–5.3)
POTASSIUM SERPL-SCNC: 2.9 MMOL/L — CRITICAL LOW (ref 3.5–5.3)
POTASSIUM SERPL-SCNC: 3 MMOL/L — LOW (ref 3.5–5.3)
POTASSIUM SERPL-SCNC: 3.9 MMOL/L — SIGNIFICANT CHANGE UP (ref 3.5–5.3)
POTASSIUM SERPL-SCNC: 4.1 MMOL/L — SIGNIFICANT CHANGE UP (ref 3.5–5.3)
POTASSIUM SERPL-SCNC: 5.2 MMOL/L — SIGNIFICANT CHANGE UP (ref 3.5–5.3)
PROCALCITONIN SERPL-MCNC: 95.91 NG/ML — HIGH (ref 0.02–0.1)
PROT SERPL-MCNC: 6.2 G/DL — SIGNIFICANT CHANGE UP (ref 6–8.3)
PROT SERPL-MCNC: 6.3 G/DL — SIGNIFICANT CHANGE UP (ref 6–8.3)
PROT SERPL-MCNC: 6.4 G/DL — SIGNIFICANT CHANGE UP (ref 6–8.3)
PROT SERPL-MCNC: 7.7 G/DL — SIGNIFICANT CHANGE UP (ref 6–8.3)
PROT SERPL-MCNC: 7.8 G/DL — SIGNIFICANT CHANGE UP (ref 6–8.3)
PROTHROM AB SERPL-ACNC: 16.2 SEC — HIGH (ref 10–12.9)
RBC # BLD: 4.95 M/UL — SIGNIFICANT CHANGE UP (ref 4.2–5.8)
RBC # BLD: 4.97 M/UL — SIGNIFICANT CHANGE UP (ref 4.2–5.8)
RBC # BLD: 4.99 M/UL — SIGNIFICANT CHANGE UP (ref 4.2–5.8)
RBC # BLD: 5.38 M/UL — SIGNIFICANT CHANGE UP (ref 4.2–5.8)
RBC # BLD: 5.45 M/UL — SIGNIFICANT CHANGE UP (ref 4.2–5.8)
RBC # FLD: 12.8 % — SIGNIFICANT CHANGE UP (ref 10.3–14.5)
RBC # FLD: 12.9 % — SIGNIFICANT CHANGE UP (ref 10.3–14.5)
RBC # FLD: 13 % — SIGNIFICANT CHANGE UP (ref 10.3–14.5)
RBC # FLD: 13.7 % — SIGNIFICANT CHANGE UP (ref 10.3–14.5)
RBC # FLD: 13.7 % — SIGNIFICANT CHANGE UP (ref 10.3–14.5)
SAO2 % BLDV: 74 % — SIGNIFICANT CHANGE UP (ref 67–88)
SODIUM SERPL-SCNC: 135 MMOL/L — SIGNIFICANT CHANGE UP (ref 135–145)
SODIUM SERPL-SCNC: 135 MMOL/L — SIGNIFICANT CHANGE UP (ref 135–145)
SODIUM SERPL-SCNC: 136 MMOL/L — SIGNIFICANT CHANGE UP (ref 135–145)
SODIUM SERPL-SCNC: 137 MMOL/L — SIGNIFICANT CHANGE UP (ref 135–145)
SODIUM SERPL-SCNC: 139 MMOL/L — SIGNIFICANT CHANGE UP (ref 135–145)
SPECIMEN SOURCE: SIGNIFICANT CHANGE UP
TROPONIN T, HIGH SENSITIVITY RESULT: 345 NG/L — HIGH (ref 0–51)
TROPONIN T, HIGH SENSITIVITY RESULT: 788 NG/L — HIGH (ref 0–51)
TROPONIN T, HIGH SENSITIVITY RESULT: 878 NG/L — HIGH (ref 0–51)
WBC # BLD: 13.6 K/UL — HIGH (ref 3.8–10.5)
WBC # BLD: 25.5 K/UL — HIGH (ref 3.8–10.5)
WBC # BLD: 27.4 K/UL — HIGH (ref 3.8–10.5)
WBC # BLD: 27.5 K/UL — HIGH (ref 3.8–10.5)
WBC # BLD: 3.9 K/UL — SIGNIFICANT CHANGE UP (ref 3.8–10.5)
WBC # FLD AUTO: 13.6 K/UL — HIGH (ref 3.8–10.5)
WBC # FLD AUTO: 25.5 K/UL — HIGH (ref 3.8–10.5)
WBC # FLD AUTO: 27.4 K/UL — HIGH (ref 3.8–10.5)
WBC # FLD AUTO: 27.5 K/UL — HIGH (ref 3.8–10.5)
WBC # FLD AUTO: 3.9 K/UL — SIGNIFICANT CHANGE UP (ref 3.8–10.5)

## 2018-12-17 PROCEDURE — 31500 INSERT EMERGENCY AIRWAY: CPT | Mod: GC,79

## 2018-12-17 PROCEDURE — 36556 INSERT NON-TUNNEL CV CATH: CPT | Mod: GC,79

## 2018-12-17 PROCEDURE — 99223 1ST HOSP IP/OBS HIGH 75: CPT | Mod: GC

## 2018-12-17 PROCEDURE — 47490 INCISION OF GALLBLADDER: CPT

## 2018-12-17 PROCEDURE — 99292 CRITICAL CARE ADDL 30 MIN: CPT | Mod: 25

## 2018-12-17 PROCEDURE — 36620 INSERTION CATHETER ARTERY: CPT | Mod: GC,79

## 2018-12-17 PROCEDURE — 71045 X-RAY EXAM CHEST 1 VIEW: CPT | Mod: 26,76

## 2018-12-17 PROCEDURE — 93010 ELECTROCARDIOGRAM REPORT: CPT | Mod: 76

## 2018-12-17 PROCEDURE — 74177 CT ABD & PELVIS W/CONTRAST: CPT | Mod: 26

## 2018-12-17 PROCEDURE — 99291 CRITICAL CARE FIRST HOUR: CPT | Mod: 25

## 2018-12-17 PROCEDURE — 99233 SBSQ HOSP IP/OBS HIGH 50: CPT

## 2018-12-17 RX ORDER — MAGNESIUM SULFATE 500 MG/ML
2 VIAL (ML) INJECTION ONCE
Qty: 0 | Refills: 0 | Status: COMPLETED | OUTPATIENT
Start: 2018-12-17 | End: 2018-12-17

## 2018-12-17 RX ORDER — HALOPERIDOL DECANOATE 100 MG/ML
5 INJECTION INTRAMUSCULAR ONCE
Qty: 0 | Refills: 0 | Status: COMPLETED | OUTPATIENT
Start: 2018-12-17 | End: 2018-12-17

## 2018-12-17 RX ORDER — ACETAMINOPHEN 500 MG
1000 TABLET ORAL ONCE
Qty: 0 | Refills: 0 | Status: COMPLETED | OUTPATIENT
Start: 2018-12-17 | End: 2018-12-17

## 2018-12-17 RX ORDER — SODIUM CHLORIDE 9 MG/ML
1000 INJECTION, SOLUTION INTRAVENOUS ONCE
Qty: 0 | Refills: 0 | Status: COMPLETED | OUTPATIENT
Start: 2018-12-17 | End: 2018-12-17

## 2018-12-17 RX ORDER — SODIUM CHLORIDE 9 MG/ML
1500 INJECTION, SOLUTION INTRAVENOUS ONCE
Qty: 0 | Refills: 0 | Status: DISCONTINUED | OUTPATIENT
Start: 2018-12-17 | End: 2018-12-17

## 2018-12-17 RX ORDER — HYDROMORPHONE HYDROCHLORIDE 2 MG/ML
1 INJECTION INTRAMUSCULAR; INTRAVENOUS; SUBCUTANEOUS ONCE
Qty: 0 | Refills: 0 | Status: DISCONTINUED | OUTPATIENT
Start: 2018-12-17 | End: 2018-12-17

## 2018-12-17 RX ORDER — NOREPINEPHRINE BITARTRATE/D5W 8 MG/250ML
0.3 PLASTIC BAG, INJECTION (ML) INTRAVENOUS
Qty: 16 | Refills: 0 | Status: DISCONTINUED | OUTPATIENT
Start: 2018-12-17 | End: 2018-12-20

## 2018-12-17 RX ORDER — VANCOMYCIN HCL 1 G
1000 VIAL (EA) INTRAVENOUS ONCE
Qty: 0 | Refills: 0 | Status: COMPLETED | OUTPATIENT
Start: 2018-12-17 | End: 2018-12-17

## 2018-12-17 RX ORDER — VASOPRESSIN 20 [USP'U]/ML
0.03 INJECTION INTRAVENOUS
Qty: 100 | Refills: 0 | Status: DISCONTINUED | OUTPATIENT
Start: 2018-12-17 | End: 2018-12-20

## 2018-12-17 RX ORDER — DEXMEDETOMIDINE HYDROCHLORIDE IN 0.9% SODIUM CHLORIDE 4 UG/ML
0.5 INJECTION INTRAVENOUS
Qty: 200 | Refills: 0 | Status: DISCONTINUED | OUTPATIENT
Start: 2018-12-17 | End: 2018-12-17

## 2018-12-17 RX ORDER — HYDROMORPHONE HYDROCHLORIDE 2 MG/ML
0.5 INJECTION INTRAMUSCULAR; INTRAVENOUS; SUBCUTANEOUS
Qty: 0 | Refills: 0 | Status: DISCONTINUED | OUTPATIENT
Start: 2018-12-17 | End: 2018-12-18

## 2018-12-17 RX ORDER — POTASSIUM CHLORIDE 20 MEQ
20 PACKET (EA) ORAL
Qty: 0 | Refills: 0 | Status: COMPLETED | OUTPATIENT
Start: 2018-12-17 | End: 2018-12-17

## 2018-12-17 RX ORDER — VANCOMYCIN HCL 1 G
125 VIAL (EA) INTRAVENOUS EVERY 6 HOURS
Qty: 0 | Refills: 0 | Status: DISCONTINUED | OUTPATIENT
Start: 2018-12-17 | End: 2018-12-17

## 2018-12-17 RX ORDER — PHENYLEPHRINE HYDROCHLORIDE 10 MG/ML
2 INJECTION INTRAVENOUS
Qty: 40 | Refills: 0 | Status: DISCONTINUED | OUTPATIENT
Start: 2018-12-17 | End: 2018-12-17

## 2018-12-17 RX ORDER — DEXMEDETOMIDINE HYDROCHLORIDE IN 0.9% SODIUM CHLORIDE 4 UG/ML
0.5 INJECTION INTRAVENOUS
Qty: 200 | Refills: 0 | Status: DISCONTINUED | OUTPATIENT
Start: 2018-12-17 | End: 2018-12-19

## 2018-12-17 RX ORDER — NOREPINEPHRINE BITARTRATE/D5W 8 MG/250ML
0.05 PLASTIC BAG, INJECTION (ML) INTRAVENOUS
Qty: 8 | Refills: 0 | Status: DISCONTINUED | OUTPATIENT
Start: 2018-12-17 | End: 2018-12-17

## 2018-12-17 RX ADMIN — HEPARIN SODIUM 5000 UNIT(S): 5000 INJECTION INTRAVENOUS; SUBCUTANEOUS at 05:18

## 2018-12-17 RX ADMIN — VASOPRESSIN 1.8 UNIT(S)/MIN: 20 INJECTION INTRAVENOUS at 15:04

## 2018-12-17 RX ADMIN — DEXMEDETOMIDINE HYDROCHLORIDE IN 0.9% SODIUM CHLORIDE 11.34 MICROGRAM(S)/KG/HR: 4 INJECTION INTRAVENOUS at 23:11

## 2018-12-17 RX ADMIN — Medication 4: at 19:14

## 2018-12-17 RX ADMIN — Medication 250 MILLIMOLE(S): at 15:02

## 2018-12-17 RX ADMIN — Medication 250 MILLIMOLE(S): at 18:27

## 2018-12-17 RX ADMIN — Medication 250 MILLIGRAM(S): at 15:03

## 2018-12-17 RX ADMIN — Medication 50 GRAM(S): at 15:07

## 2018-12-17 RX ADMIN — Medication 250 MILLIMOLE(S): at 15:22

## 2018-12-17 RX ADMIN — HYDROMORPHONE HYDROCHLORIDE 1 MILLIGRAM(S): 2 INJECTION INTRAMUSCULAR; INTRAVENOUS; SUBCUTANEOUS at 08:45

## 2018-12-17 RX ADMIN — PIPERACILLIN AND TAZOBACTAM 25 GRAM(S): 4; .5 INJECTION, POWDER, LYOPHILIZED, FOR SOLUTION INTRAVENOUS at 05:18

## 2018-12-17 RX ADMIN — Medication 1 MILLIGRAM(S): at 18:15

## 2018-12-17 RX ADMIN — Medication 2 MILLIGRAM(S): at 09:45

## 2018-12-17 RX ADMIN — SODIUM CHLORIDE 6000 MILLILITER(S): 9 INJECTION, SOLUTION INTRAVENOUS at 10:25

## 2018-12-17 RX ADMIN — SODIUM CHLORIDE 6000 MILLILITER(S): 9 INJECTION, SOLUTION INTRAVENOUS at 12:30

## 2018-12-17 RX ADMIN — SODIUM CHLORIDE 125 MILLILITER(S): 9 INJECTION, SOLUTION INTRAVENOUS at 15:04

## 2018-12-17 RX ADMIN — Medication 8.5 MICROGRAM(S)/KG/MIN: at 15:04

## 2018-12-17 RX ADMIN — Medication 400 MILLIGRAM(S): at 02:00

## 2018-12-17 RX ADMIN — HEPARIN SODIUM 5000 UNIT(S): 5000 INJECTION INTRAVENOUS; SUBCUTANEOUS at 21:06

## 2018-12-17 RX ADMIN — HALOPERIDOL DECANOATE 5 MILLIGRAM(S): 100 INJECTION INTRAMUSCULAR at 08:45

## 2018-12-17 RX ADMIN — Medication 50 MILLIEQUIVALENT(S): at 18:29

## 2018-12-17 RX ADMIN — PIPERACILLIN AND TAZOBACTAM 25 GRAM(S): 4; .5 INJECTION, POWDER, LYOPHILIZED, FOR SOLUTION INTRAVENOUS at 21:05

## 2018-12-17 RX ADMIN — Medication 50 MILLIEQUIVALENT(S): at 15:02

## 2018-12-17 RX ADMIN — Medication 1000 MILLIGRAM(S): at 02:30

## 2018-12-17 RX ADMIN — Medication 25.51 MICROGRAM(S)/KG/MIN: at 23:01

## 2018-12-17 RX ADMIN — PIPERACILLIN AND TAZOBACTAM 25 GRAM(S): 4; .5 INJECTION, POWDER, LYOPHILIZED, FOR SOLUTION INTRAVENOUS at 15:03

## 2018-12-17 RX ADMIN — Medication 5 MILLIGRAM(S): at 02:03

## 2018-12-17 RX ADMIN — Medication 50 MILLIEQUIVALENT(S): at 19:16

## 2018-12-17 RX ADMIN — Medication 400 MILLIGRAM(S): at 08:30

## 2018-12-17 NOTE — CONSULT NOTE ADULT - SUBJECTIVE AND OBJECTIVE BOX
HPI:  73M hx of remote appendectomy, CABGx4 2016, HTN, DM, HLD who presents with abdominal pain since 2AM. The pain is worst in RUQ. He endorses nausea but no vomiting. He is having chills. He had a similar episode 2 weeks ago and was worked up without avail. Last PO this AM at 10, just a small piece of bread. He denies CP/SOB/palpitations. He took Plavix this AM, but takes ASA at night.    Currently intubated and sedated.    Vital Signs Last 24 Hrs  T(C): 38.1 (16 Dec 2018 18:13), Max: 38.3 (16 Dec 2018 16:51)  T(F): 100.6 (16 Dec 2018 18:13), Max: 100.9 (16 Dec 2018 16:51)  HR: 89 (16 Dec 2018 18:13) (82 - 89)  BP: 115/69 (16 Dec 2018 18:13) (105/69 - 117/74)  BP(mean): --  RR: 18 (16 Dec 2018 18:13) (17 - 18)  SpO2: 94% (16 Dec 2018 18:13) (94% - 96%) (16 Dec 2018 18:12)    PMH:   DM (diabetes mellitus)  HLD (hyperlipidemia)  HTN (hypertension)  CAD (coronary artery disease)    PSH:   History of appendectomy  S/P CABG x 4    Medications:   heparin  Injectable 5000 Unit(s) SubCutaneous every 8 hours  HYDROmorphone  Injectable 0.5 milliGRAM(s) IV Push every 3 hours PRN  insulin lispro (HumaLOG) corrective regimen sliding scale   SubCutaneous every 6 hours  investigational medication - general 50 milliGRAM(s) IV Push every 6 hours  investigational medication - IVPB 1500 milliGRAM(s) IV Intermittent every 6 hours  lactated ringers. 1000 milliLiter(s) IV Continuous <Continuous>  LORazepam   Injectable 1 milliGRAM(s) IV Push every 3 hours PRN  norepinephrine Infusion 0.05 MICROgram(s)/kG/Min IV Continuous <Continuous>  piperacillin/tazobactam IVPB. 3.375 Gram(s) IV Intermittent every 8 hours  potassium chloride  20 mEq/100 mL IVPB 20 milliEquivalent(s) IV Intermittent every 2 hours  sodium phosphate IVPB 15 milliMole(s) IV Intermittent every 1 hour  vasopressin Infusion 0.03 Unit(s)/Min IV Continuous <Continuous>    Allergies:  No Known Allergies    FAMILY HISTORY:    Social History:  Smoking:  Alcohol:  Drugs:    REVIEW OF SYSTEMS:  Unable as he is intubated and sedated.    Vital Signs Last 24 Hrs  T(C): 37.3 (17 Dec 2018 15:00), Max: 39 (17 Dec 2018 01:34)  T(F): 99.1 (17 Dec 2018 15:00), Max: 102.2 (17 Dec 2018 01:34)  HR: 82 (17 Dec 2018 17:14) (78 - 135)  BP: 133/62 (17 Dec 2018 13:45) (68/47 - 163/68)  BP(mean): 92 (17 Dec 2018 13:45) (53 - 98)  RR: 24 (17 Dec 2018 15:30) (16 - 35)  SpO2: 97% (17 Dec 2018 15:30) (91% - 100%)    PHYSICAL EXAM:  Appearance: intubated and sedated  Eyes: PERRL, EOMI, pink conjunctiva  HENT: ET tube  Respiratory: good bilateral breath sounds  Cardiovascular: RRR premature beats, S1 normal, S2, no murmurs, rubs, or gallops; no edema; no JVD  Gastrointestinal: soft,  Musculoskeletal: No clubbing; no joint deformity  Vascular: pulses unable to palpate at feet  Lymphatic: No lymphadenopathy  Skin: No rashes, stable ecchymoses, no cyanosis  Neurologic: intubated and sedated  Psychiatry: sedated    Labs:                        14.7   25.5  )-----------( 156      ( 17 Dec 2018 13:23 )             43.3     12-17    135  |  102  |  16  ----------------------------<  121<H>  2.9<LL>   |  16<L>  |  1.78<H>    Ca    8.5      17 Dec 2018 13:23  Phos  1.0     12-17  Mg     1.6     12-17    TPro  6.3  /  Alb  3.0<L>  /  TBili  0.6  /  DBili  0.3<H>  /  AST  132<H>  /  ALT  116<H>  /  AlkPhos  130<H>  12-17    PT/INR - ( 17 Dec 2018 09:48 )   PT: 16.2 sec;   INR: 1.41 ratio         PTT - ( 17 Dec 2018 09:48 )  PTT:26.5 sec  CARDIAC MARKERS ( 17 Dec 2018 09:48 )  x     / x     / 319 U/L / x     / 2.2 ng/mL          Hemoglobin A1C, Whole Blood: 6.3 % (12-17 @ 07:51)          Cardiovascular Diagnostic Testing:  ECG: < from: 12 Lead ECG (12.16.18 @ 13:54) >  NORMAL SINUS RHYTHM  NORMAL ECG    < end of copied text      Interpretation of Telemetry: sinus rhythm occasional VPB    Imaging:

## 2018-12-17 NOTE — PROGRESS NOTE ADULT - SUBJECTIVE AND OBJECTIVE BOX
Surgery Progress Note    S: Patient seen and examined. No acute events overnight. However this AM, patient complained of severe rigors but was afebrile and did not have any pain. Patient rapidly developed SOB despite NC. Patient was also having multiple episodes of loose BMs. Patient was brought to SICU and continued to have respiratory distress requiring intubation.     O:  Vital Signs Last 24 Hrs  T(C): 37.9 (17 Dec 2018 06:59), Max: 39 (17 Dec 2018 01:34)  T(F): 100.2 (17 Dec 2018 06:59), Max: 102.2 (17 Dec 2018 01:34)  HR: 81 (17 Dec 2018 11:22) (78 - 135)  BP: 90/58 (17 Dec 2018 11:15) (68/47 - 163/68)  BP(mean): 70 (17 Dec 2018 11:15) (53 - 98)  RR: 22 (17 Dec 2018 11:15) (16 - 35)  SpO2: 97% (17 Dec 2018 11:22) (91% - 100%)    I&O's Detail    16 Dec 2018 07:01  -  17 Dec 2018 07:00  --------------------------------------------------------  IN:    IV PiggyBack: 100 mL    lactated ringers.: 1375 mL    Sodium Chloride 0.9% IV Bolus: 500 mL  Total IN: 1975 mL    OUT:    Voided: 900 mL  Total OUT: 900 mL    Total NET: 1075 mL      17 Dec 2018 07:01  -  17 Dec 2018 11:25  --------------------------------------------------------  IN:    IV PiggyBack: 100 mL    lactated ringers.: 250 mL  Total IN: 350 mL    OUT:    Intermittent Catheterization - Urethral: 180 mL  Total OUT: 180 mL    Total NET: 170 mL          MEDICATIONS  (STANDING):  acetaminophen  IVPB .. 1000 milliGRAM(s) IV Intermittent once  dexmedetomidine Infusion 0.5 MICROgram(s)/kG/Hr (11.338 mL/Hr) IV Continuous <Continuous>  haloperidol    Injectable 5 milliGRAM(s) IV Push once  heparin  Injectable 5000 Unit(s) SubCutaneous every 8 hours  HYDROmorphone  Injectable 1 milliGRAM(s) IV Push once  insulin lispro (HumaLOG) corrective regimen sliding scale   SubCutaneous every 6 hours  lactated ringers Bolus 1000 milliLiter(s) IV Bolus once  lactated ringers. 1000 milliLiter(s) (125 mL/Hr) IV Continuous <Continuous>  LORazepam   Injectable 2 milliGRAM(s) IV Push once  norepinephrine Infusion 0.05 MICROgram(s)/kG/Min (8.503 mL/Hr) IV Continuous <Continuous>  phenylephrine    Infusion 2 MICROgram(s)/kG/Min (68.025 mL/Hr) IV Continuous <Continuous>  piperacillin/tazobactam IVPB. 3.375 Gram(s) IV Intermittent every 8 hours  vancomycin    Solution 125 milliGRAM(s) Oral every 6 hours    MEDICATIONS  (PRN):  HYDROmorphone  Injectable 0.5 milliGRAM(s) IV Push every 3 hours PRN Moderate Pain (4 - 6)  LORazepam   Injectable 1 milliGRAM(s) IV Push every 3 hours PRN Agitation                            16.3   3.9   )-----------( 157      ( 17 Dec 2018 09:48 )             47.5       12-17    139  |  101  |  10  ----------------------------<  108<H>  4.1   |  22  |  1.39<H>    Ca    9.2      17 Dec 2018 06:23  Phos  3.9     12-17  Mg     1.8     12-17    TPro  7.7  /  Alb  3.8  /  TBili  0.7  /  DBili  0.2  /  AST  25  /  ALT  30  /  AlkPhos  103  12-17      Physical Exam:  Gen: Laying in bed, moderate resp distress, NC in place  Resp: labored breathing on NC  Abd: soft, NTND, no rebound or guarding  Ext: WWP  Skin: No rashes

## 2018-12-17 NOTE — CHART NOTE - NSCHARTNOTEFT_GEN_A_CORE
ACTS STUDY ENROLLMENT NOTE  [facility]     Patient was identified by study team to be eligible for ACTS study.  He is currently admitted for possible cholecystitis, requiring vasopressor infusions for septic shock.  Sapphire Rai approached patient's daughter with Dr. Saba. Dr. Saba described the study and explained that her father meets eligibility criteria for the research study.  Dr. Saba confirmed he meets no exclusion criteria at this time.  He explained the study drugs and the possibility that he may receive a placebo upon randomization.  The daughter agreed to the study.  The study duration and blood draw was explained to Tamra Deluna.  Tamra Deluna signed the consent form.  Dr. Saba and other family member signed as the enrolling investigator and witness, respectively.  Tamra was provided with a copy of the consent form, a copy was placed in the chart and a copy was sent to pharmacy.  The patient had no exclusion criteria at the time of enrollment.    Please contact myself or Sapphire Rai [phone] with any questions or issues regarding the study.    Shirley Baez MD  Pulmonary Critical Care Fellow  [phone]

## 2018-12-17 NOTE — PROGRESS NOTE ADULT - SUBJECTIVE AND OBJECTIVE BOX
73M hx of remote appendectomy, CABGx4 2016,  PAD s/pp LLE stent 2017 ,HTN, DM, HLD admitted with abdominal pain found to have cholecystitis on CT scan. Pt in SICU for septic shock 2/2 cholecystitis. Intubated and on pressors. Referred to IR for cholecystitis.     NPO status: NPO   Anticoagulation: plavix, platel  and aspirin on 12/16.  Antibiotics: zosyn 15:00 and vancomycin at 1500    Allergies: No Known Allergies      PAST MEDICAL & SURGICAL HISTORY:  DM (diabetes mellitus)  HLD (hyperlipidemia)  HTN (hypertension)  CAD (coronary artery disease)  History of appendectomy  S/P CABG x 4        Pertinent labs:                      14.7   25.5  )-----------( 156      ( 17 Dec 2018 13:23 )             43.3   12-17    135  |  102  |  16  ----------------------------<  121<H>  2.9<LL>   |  16<L>  |  1.78<H>    Ca    8.5      17 Dec 2018 13:23  Phos  1.0     12-17  Mg     1.6     12-17    TPro  6.3  /  Alb  3.0<L>  /  TBili  0.6  /  DBili  0.3<H>  /  AST  132<H>  /  ALT  116<H>  /  AlkPhos  130<H>  12-17  PT/INR - ( 17 Dec 2018 09:48 )   PT: 16.2 sec;   INR: 1.41 ratio         PTT - ( 17 Dec 2018 09:48 )  PTT:26.5 sec    Consent: Procedure/risks/ Benefits explained. Informed consent obtained from daughter. Pt's daughter verbalizes understanding. 73M hx of remote appendectomy, CABGx4 2016,  PAD s/pp LLE stent 2017 ,HTN, DM, HLD admitted with abdominal pain found to have cholecystitis on CT scan. Pt in SICU for septic shock 2/2 cholecystitis. Intubated and on pressors. Referred to IR for cholecystitis.     Discussed with SICU regarding Positive troponin, believes that it is demand ischemia.   NPO status: NPO   Anticoagulation: plavix, platel  and aspirin on 12/16.  Antibiotics: zosyn 15:00 and vancomycin at 1500    Allergies: No Known Allergies      PAST MEDICAL & SURGICAL HISTORY:  DM (diabetes mellitus)  HLD (hyperlipidemia)  HTN (hypertension)  CAD (coronary artery disease)  History of appendectomy  S/P CABG x 4        Pertinent labs:                      14.7   25.5  )-----------( 156      ( 17 Dec 2018 13:23 )             43.3   12-17    135  |  102  |  16  ----------------------------<  121<H>  2.9<LL>   |  16<L>  |  1.78<H>    Ca    8.5      17 Dec 2018 13:23  Phos  1.0     12-17  Mg     1.6     12-17    TPro  6.3  /  Alb  3.0<L>  /  TBili  0.6  /  DBili  0.3<H>  /  AST  132<H>  /  ALT  116<H>  /  AlkPhos  130<H>  12-17  PT/INR - ( 17 Dec 2018 09:48 )   PT: 16.2 sec;   INR: 1.41 ratio         PTT - ( 17 Dec 2018 09:48 )  PTT:26.5 sec    Consent: Procedure/risks/ Benefits explained. Informed consent obtained from daughter. Pt's daughter verbalizes understanding.

## 2018-12-17 NOTE — CHART NOTE - NSCHARTNOTEFT_GEN_A_CORE
Patient seen & examined on AM rounds. He initially looked well & did not have RUQ TTP on exam this am however was called back to bedside this AM for rigors. Patient noted to be having significant rigors, tachycardia to HR 130s. Unable to obtain a blood pressure 2/2 shaking however patient mentating well & responding appropriately to all questions as per daughter whom is translating at bedside. Pt with 3 episodes of loose diarrhea over approximately 30 - 40 minute span, complains of difficulty breathing & denies chest pain. Pt placed on O2 via nasal canula. ABG attempted however difficult to obtain specimen 2/2 rigors. Stool specimen sent to r/o C. diff. SICU consult obtained, also arranging for IR percutaneous cholecystostomy placement this AM.

## 2018-12-17 NOTE — PROCEDURE NOTE - NSPROCDETAILS_GEN_ALL_CORE
positive blood return obtained via catheter/all materials/supplies accounted for at end of procedure/sutured in place/location identified, draped/prepped, sterile technique used, needle inserted/introduced/connected to a pressurized flush line/Seldinger technique
sterile technique, catheter placed/guidewire recovered/lumen(s) aspirated and flushed/sterile dressing applied/ultrasound guidance

## 2018-12-17 NOTE — CONSULT NOTE ADULT - ATTENDING COMMENTS
Dr. Saba (Attending Physician)  Septic Shock likely secondary to acute cholecystitis, will place a-line, central line, intubate for acute respiratory failure with hypoxia get CT scan and arranging for perc huy after CT scan.  CAD s/p CABG with ST depressions in V4-V6 in setting of septic shock, likely stress-induced.  Holding anticoag or plts for likely perc huy.  VIVIENNE - Given Bolus of IV fluids, after bolus revealed dilated IVC with depressed EF, low VTI and minimal stroke volume variation.  Will c/w resuscitative fluids but hold further boluses.  Place on Jamey-Trac.    This patient was critically ill with one or more vital organ systems at a high probability of imminent or life threatening deterioration. Complex decision making was required to assess and treat single or multiple vital organ system failure and/or prevent further life threatening deterioration of the patient’s condition.  All recent labwork and imaging was reviewed.  Total Critical Care Time 100

## 2018-12-17 NOTE — CONSULT NOTE ADULT - ASSESSMENT
73 old man with acute cholecystitis, sepsis, now just back from percutaneous cholecystostomy. Has required pressor support. During periods of tachycardia observe ST segment depression V2 -6. Has troponin elevation. Unable to determine if has chest pain. Has known coronary artery disease and coronary revascularization surgery 2016. Based on this combination of factors can diagnose a type 2 myocardial infarction, attribute myocardial injury to demand. When more stable will need cardiac echo to evaluate ventricular function and if possible obtain a prior study (none in our system) to determine if has any new wall motion abnormality.    1. Coronary artery disease      Type 2 MI due to demand of sepsis, tachycardia      Acute treatment is management of underlying cause.    2. Acute cholecystitis and sepsis      Management per critical care team.
ASSESSMENT: 73yMale with PMH of CAD s/p CABG x4 2016, LLE stent 2017, HTN, HLD, DM who presents with acute cholecystitis, acute respiratory failure, septic shock.    PLAN:   Neurologic: AMS likely 2/2 septic metabolic encephalopathy  - BP did not tolerate Precedex  - Ativan and Dilaudid PRN for agitation and pain, respectively    Respiratory: acute hypoxic respiratory failure  - Volume /12/5/50%   - Wean FIO2 as tolerated    Cardiovascular: septic shock; CAD s/p CABG x4 with likely demand ischemia  - Change phenylephrine to norepinephrine to target MAP> 65  - Add vasopressin if pt has high norepinephrine requirements  - Jamey Trac, CVP  - Trend troponins, repeat EKG    Gastrointestinal/Nutrition: acute cholecystitis  - CT abdomen/pelvis with PO/IV Contrast  - Rule out cholangitis with T. bili, LFTs  - IR for perc huy vs OR for cholecystectomy pending CT scan  - NPO  - Protonix for stress ulcer prophylaxis    Genitourinary/Renal: VIVIENNE stage 1  - Velasco, strict I&Os  - LR @ 125    Hematologic: no acute issues  - SQH for VTE prophylaxis    Infectious Disease: acute cholecystitis, septic shock; C. diff negative  - Continue Zosyn  - 1 g Vanco  - Follow up blood cultures  - Source control with IR vs OR    Endocrine: DM  - ISS q6    Disposition: SICU. Full code.    Discussed with Dr. Wandy Rhodes, PA-C  [id number]

## 2018-12-17 NOTE — CONSULT NOTE ADULT - SUBJECTIVE AND OBJECTIVE BOX
HISTORY OF PRESENT ILLNESS:  BRADLY MOORE is a Citizen of [place] speaking 73y Male with PMH of CABGx4 2016,  PAD s/pp LLE stent 2017 on ASA/Plavix, Pletal, remote appendectomy, HTN, DM, HLD who presents with abdominal pain since 2AM on 12/16/18. The pain is worst in RUQ. He endorses nausea but no vomiting. He is having chills. He had a similar episode 2 weeks ago and was worked up without avail. Last PO intake 12/16/18 at 10am, just a small piece of bread. He denies CP/SOB/palpitations. He took Plavix on the morning of 12/16. Pt was on 9 Primitivo treated with IV antibiotics for acute cholecystitis. On the morning of 12/17 pt became tachycardic to 130s with fevers and rigors and a decrease in BP (101 systolic from 130s). He was also altered. Pt developed diarrrhea and C. diff sample sent as well. SICU consult called for closer monitoring. Plan for pt to go to IR for percutaneous cholecystostomy tube.    PAST MEDICAL HISTORY: DM (diabetes mellitus)  HLD (hyperlipidemia)  HTN (hypertension)  CAD (coronary artery disease)      PAST SURGICAL HISTORY: History of appendectomy  S/P CABG x 4 3026  LLE stent 2017      FAMILY HISTORY: non-contributory    SOCIAL HISTORY:  Accompanied by daughter. Former smoker    CODE STATUS: Full code    HOME MEDICATIONS:  * Patient Currently Takes Medications as of 16-Dec-2018 18:18 documented in Structured Notes  · 	metoprolol succinate 25 mg oral tablet, extended release: 1 tab(s) orally once a day, Last Dose Taken:    · 	aspirin 81 mg oral tablet: 1 tab(s) orally once a day, Last Dose Taken:    · 	Plavix 75 mg oral tablet: 1 tab(s) orally once a day, Last Dose Taken:    · 	cilostazol 50 mg oral tablet: 1 tab(s) orally 2 times a day, Last Dose Taken:    · 	metFORMIN 500 mg oral tablet: 1 tab(s) orally 2 times a day, Last Dose Taken:    · 	atorvastatin 80 mg oral tablet: 1 tab(s) orally once a day, Last Dose Taken:    · 	losartan 25 mg oral tablet: 1 tab(s) orally once a day, Last Dose Taken:      ALLERGIES: No Known Allergies      VITAL SIGNS:  ICU Vital Signs Last 24 Hrs  T(C): 37.9 (17 Dec 2018 06:59), Max: 39 (17 Dec 2018 01:34)  T(F): 100.2 (17 Dec 2018 06:59), Max: 102.2 (17 Dec 2018 01:34)  HR: 79 (17 Dec 2018 05:06) (79 - 90)  BP: 113/73 (17 Dec 2018 05:06) (104/59 - 117/74)  BP(mean): --  ABP: --  ABP(mean): --  RR: 16 (17 Dec 2018 05:06) (16 - 18)  SpO2: 94% (17 Dec 2018 05:06) (94% - 96%)      NEURO  Exam:  Meds:acetaminophen   Tablet .. 650 milliGRAM(s) Oral every 6 hours PRN Mild Pain (1 - 3)  acetaminophen  IVPB .. 1000 milliGRAM(s) IV Intermittent once      RESPIRATORY  Mechanical Ventilation:     Exam:  Meds:    CARDIOVASCULAR  VBG - ( 16 Dec 2018 13:01 )  pH: 7.41  /  pCO2: 42    /  pO2: <20   / HCO3: 26    / Base Excess: 1.2   /  SaO2: 20     Lactate: 2.7              Exam:  Cardiac Rhythm:  Meds:metoprolol tartrate Injectable 5 milliGRAM(s) IV Push every 6 hours      GI/NUTRITION  Exam:  Diet:  Meds:    GENITOURINARY/RENAL  Meds:dextrose 5%. 1000 milliLiter(s) IV Continuous <Continuous>  lactated ringers Bolus 1500 milliLiter(s) IV Bolus once  lactated ringers. 1000 milliLiter(s) IV Continuous <Continuous>      12-16 @ 07:01  -  12-17 @ 07:00  --------------------------------------------------------  IN:    IV PiggyBack: 100 mL    lactated ringers.: 1125 mL    Sodium Chloride 0.9% IV Bolus: 500 mL  Total IN: 1725 mL    OUT:    Voided: 900 mL  Total OUT: 900 mL    Total NET: 825 mL        Weight (kg): 90.7 (12-16 @ 11:41)  12-17    139  |  101  |  10  ----------------------------<  108<H>  4.1   |  22  |  1.39<H>    Ca    9.2      17 Dec 2018 06:23  Phos  3.9     12-17  Mg     1.8     12-17    TPro  7.7  /  Alb  3.8  /  TBili  0.7  /  DBili  0.2  /  AST  25  /  ALT  30  /  AlkPhos  103  12-17    [ ] Velasco catheter, indication: urine output monitoring in critically ill patient    HEMATOLOGIC  [ ] VTE Prophylaxis:  heparin  Injectable 5000 Unit(s) SubCutaneous every 8 hours                          16.0   13.6  )-----------( 229      ( 17 Dec 2018 05:46 )             46.9     PT/INR - ( 16 Dec 2018 13:01 )   PT: 12.8 sec;   INR: 1.11 ratio         PTT - ( 16 Dec 2018 13:01 )  PTT:27.0 sec  Transfusion: [ ] PRBC	[ ] Platelets	[ ] FFP	[ ] Cryoprecipitate      INFECTIOUS DISEASES  Meds:piperacillin/tazobactam IVPB. 3.375 Gram(s) IV Intermittent every 8 hours    RECENT CULTURES:      ENDOCRINE  Meds:dextrose 40% Gel 15 Gram(s) Oral once PRN  dextrose 50% Injectable 12.5 Gram(s) IV Push once  dextrose 50% Injectable 25 Gram(s) IV Push once  dextrose 50% Injectable 25 Gram(s) IV Push once  glucagon  Injectable 1 milliGRAM(s) IntraMuscular once PRN  insulin lispro (HumaLOG) corrective regimen sliding scale   SubCutaneous every 6 hours    CAPILLARY BLOOD GLUCOSE      POCT Blood Glucose.: 88 mg/dL (17 Dec 2018 05:17)  POCT Blood Glucose.: 105 mg/dL (17 Dec 2018 00:10)  POCT Blood Glucose.: 116 mg/dL (16 Dec 2018 20:41)      PATIENT CARE ACCESS DEVICES:  [ ] Peripheral IV  [ ] Central Venous Line	[ ] R	[ ] L	[ ] IJ	[ ] Fem	[ ] SC	Placed:   [ ] Arterial Line		[ ] R	[ ] L	[ ] Fem	[ ] Rad	[ ] Ax	Placed:   [ ] PICC:					[ ] Mediport  [ ] Urinary Catheter, Date Placed:   [x] Necessity of urinary, arterial, and venous catheters discussed    OTHER MEDICATIONS:     IMAGING STUDIES: HISTORY OF PRESENT ILLNESS:  BRADLY MOORE is a [place] speaking 73y Male with PMH of CABGx4 2016,  PAD s/pp LLE stent 2017 on ASA/Plavix, Pletal, remote appendectomy, HTN, DM, HLD who presents with abdominal pain since 2AM on 12/16/18. The pain is worst in RUQ. He endorses nausea but no vomiting. He is having chills. He had a similar episode 2 weeks ago and was worked up without avail. Last PO intake 12/16/18 at 10am, just a small piece of bread. He denies CP/SOB/palpitations. He took Plavix on the morning of 12/16. Pt was on 9 Primitivo treated with IV antibiotics for acute cholecystitis. On the morning of 12/17 pt became tachycardic to 130s with fevers and rigors and a decrease in BP (101 systolic from 130s). He was also altered. Pt developed diarrhea and C. diff sample sent as well. SICU consult called for closer monitoring. Plan for pt to go to CT abdomen/pelvis and then plan for IR for perc huy vs OR for cholecystectomy. Upon arrival to SICU, pt self D/Yovani IV and very restless and agitated. Pt also tachypneic, with O2 sats in low 90s. Decision was made to intubate patient to secure airway. Pt then became hypotensive and received 1.5 L LR fluid bolus. Left radial arterial line and right IJ central line placed, pt started on vasopressors. Pt's daughter, Tamra, initially at bedside and aware of pt's transfer to SICU. She was called and made aware of intubation, central line.    PAST MEDICAL HISTORY: DM (diabetes mellitus)  HLD (hyperlipidemia)  HTN (hypertension)  CAD (coronary artery disease)      PAST SURGICAL HISTORY: History of appendectomy  S/P CABG x 4 3026  LLE stent 2017      FAMILY HISTORY: non-contributory    SOCIAL HISTORY:  Accompanied by daughter. Former smoker    CODE STATUS: Full code    HOME MEDICATIONS:  * Patient Currently Takes Medications as of 16-Dec-2018 18:18 documented in Structured Notes  · 	metoprolol succinate 25 mg oral tablet, extended release: 1 tab(s) orally once a day, Last Dose Taken:    · 	aspirin 81 mg oral tablet: 1 tab(s) orally once a day, Last Dose Taken:    · 	Plavix 75 mg oral tablet: 1 tab(s) orally once a day, Last Dose Taken:    · 	cilostazol 50 mg oral tablet: 1 tab(s) orally 2 times a day, Last Dose Taken:    · 	metFORMIN 500 mg oral tablet: 1 tab(s) orally 2 times a day, Last Dose Taken:    · 	atorvastatin 80 mg oral tablet: 1 tab(s) orally once a day, Last Dose Taken:    · 	losartan 25 mg oral tablet: 1 tab(s) orally once a day, Last Dose Taken:      ALLERGIES: No Known Allergies      VITAL SIGNS:  ICU Vital Signs Last 24 Hrs  T(C): 37.9 (17 Dec 2018 06:59), Max: 39 (17 Dec 2018 01:34)  T(F): 100.2 (17 Dec 2018 06:59), Max: 102.2 (17 Dec 2018 01:34)  HR: 79 (17 Dec 2018 05:06) (79 - 90)  BP: 113/73 (17 Dec 2018 05:06) (104/59 - 117/74)  RR: 16 (17 Dec 2018 05:06) (16 - 18)  SpO2: 94% (17 Dec 2018 05:06) (94% - 96%)      NEURO  Exam: agitated, restless  Meds: acetaminophen   Tablet .. 650 milliGRAM(s) Oral every 6 hours PRN Mild Pain (1 - 3)  acetaminophen  IVPB .. 1000 milliGRAM(s) IV Intermittent once      RESPIRATORY  Exam: coarse breath sounds  Meds: x  ABG - ( 17 Dec 2018 09:58 )  pH, Arterial: 7.40  pH, Blood: x     /  pCO2: 28    /  pO2: 246   / HCO3: 17    / Base Excess: -6.4  /  SaO2: 99            CARDIOVASCULAR  VBG - ( 16 Dec 2018 13:01 )  pH: 7.41  /  pCO2: 42    /  pO2: <20   / HCO3: 26    / Base Excess: 1.2   /  SaO2: 20     Lactate: 2.7      Exam: regular rhythm, tachycardic  Cardiac Rhythm: sinus tachycardia  Meds:metoprolol tartrate Injectable 5 milliGRAM(s) IV Push every 6 hours      GI/NUTRITION  Exam: soft, mildly tender, nondistended, no rebound, no guarding  Diet: NPO  Meds: x    GENITOURINARY/RENAL  Meds:dextrose 5%. 1000 milliLiter(s) IV Continuous <Continuous>  lactated ringers Bolus 1500 milliLiter(s) IV Bolus once  lactated ringers. 1000 milliLiter(s) IV Continuous <Continuous>      12-16 @ 07:01  -  12-17 @ 07:00  --------------------------------------------------------  IN:    IV PiggyBack: 100 mL    lactated ringers.: 1125 mL    Sodium Chloride 0.9% IV Bolus: 500 mL  Total IN: 1725 mL    OUT:    Voided: 900 mL  Total OUT: 900 mL    Total NET: 825 mL        Weight (kg): 90.7 (12-16 @ 11:41)  12-17    139  |  101  |  10  ----------------------------<  108<H>  4.1   |  22  |  1.39<H>    Ca    9.2      17 Dec 2018 06:23  Phos  3.9     12-17  Mg     1.8     12-17    TPro  7.7  /  Alb  3.8  /  TBili  0.7  /  DBili  0.2  /  AST  25  /  ALT  30  /  AlkPhos  103  12-17    [x ] Velasco catheter, indication: urine output monitoring in critically ill patient    HEMATOLOGIC  [x ] VTE Prophylaxis:  heparin  Injectable 5000 Unit(s) SubCutaneous every 8 hours                          16.0   13.6  )-----------( 229      ( 17 Dec 2018 05:46 )             46.9   PT/INR - ( 17 Dec 2018 09:48 )   PT: 16.2 sec;   INR: 1.41 ratio         PTT - ( 17 Dec 2018 09:48 )  PTT:26.5 sec    Transfusion: [ ] PRBC	[ ] Platelets	[ ] FFP	[ ] Cryoprecipitate      INFECTIOUS DISEASES  Meds:piperacillin/tazobactam IVPB. 3.375 Gram(s) IV Intermittent every 8 hours    RECENT CULTURES: pending      ENDOCRINE  Meds:dextrose 40% Gel 15 Gram(s) Oral once PRN  dextrose 50% Injectable 12.5 Gram(s) IV Push once  dextrose 50% Injectable 25 Gram(s) IV Push once  dextrose 50% Injectable 25 Gram(s) IV Push once  glucagon  Injectable 1 milliGRAM(s) IntraMuscular once PRN  insulin lispro (HumaLOG) corrective regimen sliding scale   SubCutaneous every 6 hours    CAPILLARY BLOOD GLUCOSE  POCT Blood Glucose.: 88 mg/dL (17 Dec 2018 05:17)  POCT Blood Glucose.: 105 mg/dL (17 Dec 2018 00:10)  POCT Blood Glucose.: 116 mg/dL (16 Dec 2018 20:41)      PATIENT CARE ACCESS DEVICES:  [x ] Peripheral IV  [ x] Central Venous Line	[x ] R	[ ] L	[x ] IJ	[ ] Fem	[ ] SC	Placed: 12/17  [ x] Arterial Line		[ ] R	[ x] L	[ ] Fem	[ x] Rad	[ ] Ax	Placed: 12/17  [ ] PICC:					[ ] Mediport  [ x] Urinary Catheter, Date Placed: 12/17  [x] Necessity of urinary, arterial, and venous catheters discussed    OTHER MEDICATIONS:  x     IMAGING STUDIES: < from: US Abdomen Upper Quadrant Right (12.16.18 @ 16:15) >  IMPRESSION:     Findings suspicious for acute cholecystitis. Correlate clinically. This   could be confirmed with HIDA scan as needed.    Hepatic steatosis.    These findings were discussed with Dr. MAYRA BURK at 12/16/2018 4:23   PM by Dr. Marifer Nixon of Radiology with read back confirmation.

## 2018-12-17 NOTE — PROGRESS NOTE ADULT - SUBJECTIVE AND OBJECTIVE BOX
Interventional Radiology Brief- Operative Note    Procedure: Percutaneous cholecystostomy    Operators: Jamal    Anesthesia (type): general administered by anesth attg    Contrast: none    EBL: none    Findings/Follow up Plan of Care: US/fluoro guided percutaneous cholecystostomy performed. 8 Fr drain placed. Appx 80cc green viscous cloudy fluid drained. Drain placed to bag. Pt tolerated procedure. Full report to follow.    Specimens Removed: sample collected for microbiology    Implants: drain as above    Complications: none    Condition/Disposition: gaurded/sicu    Please call Interventional Radiology x [phone] with any questions, concerns, or issues.

## 2018-12-17 NOTE — ED PROCEDURE NOTE - ATTENDING CONTRIBUTION TO CARE
Dr. Saba (Attending Physician)  I supervised the above procedure and agree with the documented note.

## 2018-12-18 LAB
ALBUMIN SERPL ELPH-MCNC: 2.7 G/DL — LOW (ref 3.3–5)
ALBUMIN SERPL ELPH-MCNC: 2.7 G/DL — LOW (ref 3.3–5)
ALBUMIN SERPL ELPH-MCNC: 2.8 G/DL — LOW (ref 3.3–5)
ALBUMIN SERPL ELPH-MCNC: 2.8 G/DL — LOW (ref 3.3–5)
ALP SERPL-CCNC: 106 U/L — SIGNIFICANT CHANGE UP (ref 40–120)
ALP SERPL-CCNC: 112 U/L — SIGNIFICANT CHANGE UP (ref 40–120)
ALP SERPL-CCNC: 114 U/L — SIGNIFICANT CHANGE UP (ref 40–120)
ALP SERPL-CCNC: 94 U/L — SIGNIFICANT CHANGE UP (ref 40–120)
ALT FLD-CCNC: 249 U/L — HIGH (ref 10–45)
ALT FLD-CCNC: 267 U/L — HIGH (ref 10–45)
ALT FLD-CCNC: 299 U/L — HIGH (ref 10–45)
ALT FLD-CCNC: 303 U/L — HIGH (ref 10–45)
ANION GAP SERPL CALC-SCNC: 14 MMOL/L — SIGNIFICANT CHANGE UP (ref 5–17)
ANION GAP SERPL CALC-SCNC: 15 MMOL/L — SIGNIFICANT CHANGE UP (ref 5–17)
ANION GAP SERPL CALC-SCNC: 16 MMOL/L — SIGNIFICANT CHANGE UP (ref 5–17)
ANION GAP SERPL CALC-SCNC: 16 MMOL/L — SIGNIFICANT CHANGE UP (ref 5–17)
APTT BLD: 31.3 SEC — SIGNIFICANT CHANGE UP (ref 27.5–36.3)
APTT BLD: 33 SEC — SIGNIFICANT CHANGE UP (ref 27.5–36.3)
AST SERPL-CCNC: 203 U/L — HIGH (ref 10–40)
AST SERPL-CCNC: 256 U/L — HIGH (ref 10–40)
AST SERPL-CCNC: 259 U/L — HIGH (ref 10–40)
AST SERPL-CCNC: 294 U/L — HIGH (ref 10–40)
BASE EXCESS BLDV CALC-SCNC: -2.8 MMOL/L — LOW (ref -2–2)
BASE EXCESS BLDV CALC-SCNC: -5.8 MMOL/L — LOW (ref -2–2)
BILIRUB DIRECT SERPL-MCNC: 0.1 MG/DL — SIGNIFICANT CHANGE UP (ref 0–0.2)
BILIRUB DIRECT SERPL-MCNC: 0.2 MG/DL — SIGNIFICANT CHANGE UP (ref 0–0.2)
BILIRUB DIRECT SERPL-MCNC: 0.3 MG/DL — HIGH (ref 0–0.2)
BILIRUB INDIRECT FLD-MCNC: 0.2 MG/DL — SIGNIFICANT CHANGE UP (ref 0.2–1)
BILIRUB INDIRECT FLD-MCNC: 0.3 MG/DL — SIGNIFICANT CHANGE UP (ref 0.2–1)
BILIRUB INDIRECT FLD-MCNC: 0.4 MG/DL — SIGNIFICANT CHANGE UP (ref 0.2–1)
BILIRUB SERPL-MCNC: 0.4 MG/DL — SIGNIFICANT CHANGE UP (ref 0.2–1.2)
BILIRUB SERPL-MCNC: 0.4 MG/DL — SIGNIFICANT CHANGE UP (ref 0.2–1.2)
BILIRUB SERPL-MCNC: 0.5 MG/DL — SIGNIFICANT CHANGE UP (ref 0.2–1.2)
BILIRUB SERPL-MCNC: 0.6 MG/DL — SIGNIFICANT CHANGE UP (ref 0.2–1.2)
BUN SERPL-MCNC: 12 MG/DL — SIGNIFICANT CHANGE UP (ref 7–23)
BUN SERPL-MCNC: 13 MG/DL — SIGNIFICANT CHANGE UP (ref 7–23)
BUN SERPL-MCNC: 14 MG/DL — SIGNIFICANT CHANGE UP (ref 7–23)
BUN SERPL-MCNC: 16 MG/DL — SIGNIFICANT CHANGE UP (ref 7–23)
CALCIUM SERPL-MCNC: 7.5 MG/DL — LOW (ref 8.4–10.5)
CALCIUM SERPL-MCNC: 7.8 MG/DL — LOW (ref 8.4–10.5)
CALCIUM SERPL-MCNC: 8 MG/DL — LOW (ref 8.4–10.5)
CALCIUM SERPL-MCNC: 8.3 MG/DL — LOW (ref 8.4–10.5)
CHLORIDE SERPL-SCNC: 102 MMOL/L — SIGNIFICANT CHANGE UP (ref 96–108)
CHLORIDE SERPL-SCNC: 102 MMOL/L — SIGNIFICANT CHANGE UP (ref 96–108)
CHLORIDE SERPL-SCNC: 104 MMOL/L — SIGNIFICANT CHANGE UP (ref 96–108)
CHLORIDE SERPL-SCNC: 104 MMOL/L — SIGNIFICANT CHANGE UP (ref 96–108)
CO2 BLDV-SCNC: 17 MMOL/L — LOW (ref 22–30)
CO2 BLDV-SCNC: 23 MMOL/L — SIGNIFICANT CHANGE UP (ref 22–30)
CO2 SERPL-SCNC: 15 MMOL/L — LOW (ref 22–31)
CO2 SERPL-SCNC: 15 MMOL/L — LOW (ref 22–31)
CO2 SERPL-SCNC: 17 MMOL/L — LOW (ref 22–31)
CO2 SERPL-SCNC: 17 MMOL/L — LOW (ref 22–31)
CREAT SERPL-MCNC: 1.26 MG/DL — SIGNIFICANT CHANGE UP (ref 0.5–1.3)
CREAT SERPL-MCNC: 1.28 MG/DL — SIGNIFICANT CHANGE UP (ref 0.5–1.3)
CREAT SERPL-MCNC: 1.3 MG/DL — SIGNIFICANT CHANGE UP (ref 0.5–1.3)
CREAT SERPL-MCNC: 1.33 MG/DL — HIGH (ref 0.5–1.3)
GAS PNL BLDA: SIGNIFICANT CHANGE UP
GAS PNL BLDV: SIGNIFICANT CHANGE UP
GLUCOSE BLDC GLUCOMTR-MCNC: 110 MG/DL — HIGH (ref 70–99)
GLUCOSE BLDC GLUCOMTR-MCNC: 113 MG/DL — HIGH (ref 70–99)
GLUCOSE BLDC GLUCOMTR-MCNC: 115 MG/DL — HIGH (ref 70–99)
GLUCOSE BLDC GLUCOMTR-MCNC: 118 MG/DL — HIGH (ref 70–99)
GLUCOSE SERPL-MCNC: 149 MG/DL — HIGH (ref 70–99)
GLUCOSE SERPL-MCNC: 150 MG/DL — HIGH (ref 70–99)
GLUCOSE SERPL-MCNC: 152 MG/DL — HIGH (ref 70–99)
GLUCOSE SERPL-MCNC: 152 MG/DL — HIGH (ref 70–99)
GRAM STN FLD: SIGNIFICANT CHANGE UP
HCO3 BLDV-SCNC: 16 MMOL/L — LOW (ref 21–29)
HCO3 BLDV-SCNC: 21 MMOL/L — SIGNIFICANT CHANGE UP (ref 21–29)
HCT VFR BLD CALC: 38.1 % — LOW (ref 39–50)
HCT VFR BLD CALC: 39.3 % — SIGNIFICANT CHANGE UP (ref 39–50)
HCT VFR BLD CALC: 41.2 % — SIGNIFICANT CHANGE UP (ref 39–50)
HCT VFR BLD CALC: 42.1 % — SIGNIFICANT CHANGE UP (ref 39–50)
HGB BLD-MCNC: 13.1 G/DL — SIGNIFICANT CHANGE UP (ref 13–17)
HGB BLD-MCNC: 13.4 G/DL — SIGNIFICANT CHANGE UP (ref 13–17)
HGB BLD-MCNC: 14 G/DL — SIGNIFICANT CHANGE UP (ref 13–17)
HGB BLD-MCNC: 14.2 G/DL — SIGNIFICANT CHANGE UP (ref 13–17)
HOROWITZ INDEX BLDV+IHG-RTO: 40 — SIGNIFICANT CHANGE UP
HOROWITZ INDEX BLDV+IHG-RTO: 40 — SIGNIFICANT CHANGE UP
INR BLD: 1.45 RATIO — HIGH (ref 0.88–1.16)
INR BLD: 1.47 RATIO — HIGH (ref 0.88–1.16)
LACTATE SERPL-SCNC: 1.7 MMOL/L — SIGNIFICANT CHANGE UP (ref 0.7–2)
MAGNESIUM SERPL-MCNC: 1.6 MG/DL — SIGNIFICANT CHANGE UP (ref 1.6–2.6)
MAGNESIUM SERPL-MCNC: 2 MG/DL — SIGNIFICANT CHANGE UP (ref 1.6–2.6)
MAGNESIUM SERPL-MCNC: 2.2 MG/DL — SIGNIFICANT CHANGE UP (ref 1.6–2.6)
MCHC RBC-ENTMCNC: 29.4 PG — SIGNIFICANT CHANGE UP (ref 27–34)
MCHC RBC-ENTMCNC: 29.5 PG — SIGNIFICANT CHANGE UP (ref 27–34)
MCHC RBC-ENTMCNC: 29.5 PG — SIGNIFICANT CHANGE UP (ref 27–34)
MCHC RBC-ENTMCNC: 29.7 PG — SIGNIFICANT CHANGE UP (ref 27–34)
MCHC RBC-ENTMCNC: 33.8 GM/DL — SIGNIFICANT CHANGE UP (ref 32–36)
MCHC RBC-ENTMCNC: 34 GM/DL — SIGNIFICANT CHANGE UP (ref 32–36)
MCHC RBC-ENTMCNC: 34.1 GM/DL — SIGNIFICANT CHANGE UP (ref 32–36)
MCHC RBC-ENTMCNC: 34.4 GM/DL — SIGNIFICANT CHANGE UP (ref 32–36)
MCV RBC AUTO: 86.3 FL — SIGNIFICANT CHANGE UP (ref 80–100)
MCV RBC AUTO: 86.6 FL — SIGNIFICANT CHANGE UP (ref 80–100)
MCV RBC AUTO: 86.6 FL — SIGNIFICANT CHANGE UP (ref 80–100)
MCV RBC AUTO: 86.9 FL — SIGNIFICANT CHANGE UP (ref 80–100)
PCO2 BLDV: 26 MMHG — LOW (ref 35–50)
PCO2 BLDV: 38 MMHG — SIGNIFICANT CHANGE UP (ref 35–50)
PH BLDV: 7.38 — SIGNIFICANT CHANGE UP (ref 7.35–7.45)
PH BLDV: 7.43 — SIGNIFICANT CHANGE UP (ref 7.35–7.45)
PHOSPHATE SERPL-MCNC: 2.1 MG/DL — LOW (ref 2.5–4.5)
PHOSPHATE SERPL-MCNC: 2.6 MG/DL — SIGNIFICANT CHANGE UP (ref 2.5–4.5)
PHOSPHATE SERPL-MCNC: 2.8 MG/DL — SIGNIFICANT CHANGE UP (ref 2.5–4.5)
PLATELET # BLD AUTO: 117 K/UL — LOW (ref 150–400)
PLATELET # BLD AUTO: 76 K/UL — LOW (ref 150–400)
PLATELET # BLD AUTO: 86 K/UL — LOW (ref 150–400)
PLATELET # BLD AUTO: 96 K/UL — LOW (ref 150–400)
PO2 BLDV: 27 MMHG — SIGNIFICANT CHANGE UP (ref 25–45)
PO2 BLDV: 85 MMHG — HIGH (ref 25–45)
POTASSIUM SERPL-MCNC: 4.3 MMOL/L — SIGNIFICANT CHANGE UP (ref 3.5–5.3)
POTASSIUM SERPL-MCNC: 4.3 MMOL/L — SIGNIFICANT CHANGE UP (ref 3.5–5.3)
POTASSIUM SERPL-MCNC: 4.4 MMOL/L — SIGNIFICANT CHANGE UP (ref 3.5–5.3)
POTASSIUM SERPL-MCNC: 4.4 MMOL/L — SIGNIFICANT CHANGE UP (ref 3.5–5.3)
POTASSIUM SERPL-SCNC: 4.3 MMOL/L — SIGNIFICANT CHANGE UP (ref 3.5–5.3)
POTASSIUM SERPL-SCNC: 4.3 MMOL/L — SIGNIFICANT CHANGE UP (ref 3.5–5.3)
POTASSIUM SERPL-SCNC: 4.4 MMOL/L — SIGNIFICANT CHANGE UP (ref 3.5–5.3)
POTASSIUM SERPL-SCNC: 4.4 MMOL/L — SIGNIFICANT CHANGE UP (ref 3.5–5.3)
PROCALCITONIN SERPL-MCNC: 93.3 NG/ML — HIGH (ref 0.02–0.1)
PROT SERPL-MCNC: 5.8 G/DL — LOW (ref 6–8.3)
PROT SERPL-MCNC: 6 G/DL — SIGNIFICANT CHANGE UP (ref 6–8.3)
PROT SERPL-MCNC: 6.1 G/DL — SIGNIFICANT CHANGE UP (ref 6–8.3)
PROT SERPL-MCNC: 6.2 G/DL — SIGNIFICANT CHANGE UP (ref 6–8.3)
PROTHROM AB SERPL-ACNC: 16.7 SEC — HIGH (ref 10–12.9)
PROTHROM AB SERPL-ACNC: 17.1 SEC — HIGH (ref 10–12.9)
RBC # BLD: 4.41 M/UL — SIGNIFICANT CHANGE UP (ref 4.2–5.8)
RBC # BLD: 4.54 M/UL — SIGNIFICANT CHANGE UP (ref 4.2–5.8)
RBC # BLD: 4.76 M/UL — SIGNIFICANT CHANGE UP (ref 4.2–5.8)
RBC # BLD: 4.84 M/UL — SIGNIFICANT CHANGE UP (ref 4.2–5.8)
RBC # FLD: 12.8 % — SIGNIFICANT CHANGE UP (ref 10.3–14.5)
RBC # FLD: 12.8 % — SIGNIFICANT CHANGE UP (ref 10.3–14.5)
RBC # FLD: 13.5 % — SIGNIFICANT CHANGE UP (ref 10.3–14.5)
RBC # FLD: 13.5 % — SIGNIFICANT CHANGE UP (ref 10.3–14.5)
SAO2 % BLDV: 43 % — LOW (ref 67–88)
SAO2 % BLDV: 96 % — HIGH (ref 67–88)
SODIUM SERPL-SCNC: 133 MMOL/L — LOW (ref 135–145)
SODIUM SERPL-SCNC: 133 MMOL/L — LOW (ref 135–145)
SODIUM SERPL-SCNC: 135 MMOL/L — SIGNIFICANT CHANGE UP (ref 135–145)
SODIUM SERPL-SCNC: 136 MMOL/L — SIGNIFICANT CHANGE UP (ref 135–145)
SPECIMEN SOURCE: SIGNIFICANT CHANGE UP
WBC # BLD: 11.5 K/UL — HIGH (ref 3.8–10.5)
WBC # BLD: 15.5 K/UL — HIGH (ref 3.8–10.5)
WBC # BLD: 20.9 K/UL — HIGH (ref 3.8–10.5)
WBC # BLD: 9.5 K/UL — SIGNIFICANT CHANGE UP (ref 3.8–10.5)
WBC # FLD AUTO: 11.5 K/UL — HIGH (ref 3.8–10.5)
WBC # FLD AUTO: 15.5 K/UL — HIGH (ref 3.8–10.5)
WBC # FLD AUTO: 20.9 K/UL — HIGH (ref 3.8–10.5)
WBC # FLD AUTO: 9.5 K/UL — SIGNIFICANT CHANGE UP (ref 3.8–10.5)

## 2018-12-18 PROCEDURE — 99291 CRITICAL CARE FIRST HOUR: CPT

## 2018-12-18 PROCEDURE — 71045 X-RAY EXAM CHEST 1 VIEW: CPT | Mod: 26

## 2018-12-18 PROCEDURE — 99231 SBSQ HOSP IP/OBS SF/LOW 25: CPT

## 2018-12-18 PROCEDURE — 93306 TTE W/DOPPLER COMPLETE: CPT | Mod: 26

## 2018-12-18 RX ORDER — ASPIRIN/CALCIUM CARB/MAGNESIUM 324 MG
300 TABLET ORAL DAILY
Qty: 0 | Refills: 0 | Status: DISCONTINUED | OUTPATIENT
Start: 2018-12-18 | End: 2018-12-20

## 2018-12-18 RX ORDER — ENOXAPARIN SODIUM 100 MG/ML
40 INJECTION SUBCUTANEOUS EVERY 24 HOURS
Qty: 0 | Refills: 0 | Status: DISCONTINUED | OUTPATIENT
Start: 2018-12-18 | End: 2018-12-18

## 2018-12-18 RX ORDER — MAGNESIUM SULFATE 500 MG/ML
2 VIAL (ML) INJECTION
Qty: 0 | Refills: 0 | Status: COMPLETED | OUTPATIENT
Start: 2018-12-18 | End: 2018-12-18

## 2018-12-18 RX ORDER — FONDAPARINUX SODIUM 2.5 MG/.5ML
2.5 INJECTION, SOLUTION SUBCUTANEOUS DAILY
Qty: 0 | Refills: 0 | Status: DISCONTINUED | OUTPATIENT
Start: 2018-12-18 | End: 2018-12-20

## 2018-12-18 RX ORDER — CHLORHEXIDINE GLUCONATE 213 G/1000ML
15 SOLUTION TOPICAL
Qty: 0 | Refills: 0 | Status: DISCONTINUED | OUTPATIENT
Start: 2018-12-18 | End: 2018-12-20

## 2018-12-18 RX ORDER — CALCIUM GLUCONATE 100 MG/ML
2 VIAL (ML) INTRAVENOUS ONCE
Qty: 0 | Refills: 0 | Status: COMPLETED | OUTPATIENT
Start: 2018-12-18 | End: 2018-12-18

## 2018-12-18 RX ORDER — GABAPENTIN 400 MG/1
300 CAPSULE ORAL
Qty: 0 | Refills: 0 | Status: DISCONTINUED | OUTPATIENT
Start: 2018-12-18 | End: 2018-12-18

## 2018-12-18 RX ORDER — HYDROMORPHONE HYDROCHLORIDE 2 MG/ML
0.5 INJECTION INTRAMUSCULAR; INTRAVENOUS; SUBCUTANEOUS
Qty: 0 | Refills: 0 | Status: DISCONTINUED | OUTPATIENT
Start: 2018-12-18 | End: 2018-12-20

## 2018-12-18 RX ORDER — CHLORHEXIDINE GLUCONATE 213 G/1000ML
1 SOLUTION TOPICAL
Qty: 0 | Refills: 0 | Status: DISCONTINUED | OUTPATIENT
Start: 2018-12-18 | End: 2018-12-22

## 2018-12-18 RX ORDER — QUETIAPINE FUMARATE 200 MG/1
25 TABLET, FILM COATED ORAL EVERY 12 HOURS
Qty: 0 | Refills: 0 | Status: DISCONTINUED | OUTPATIENT
Start: 2018-12-18 | End: 2018-12-21

## 2018-12-18 RX ORDER — PANTOPRAZOLE SODIUM 20 MG/1
40 TABLET, DELAYED RELEASE ORAL DAILY
Qty: 0 | Refills: 0 | Status: DISCONTINUED | OUTPATIENT
Start: 2018-12-18 | End: 2018-12-20

## 2018-12-18 RX ORDER — SODIUM CHLORIDE 9 MG/ML
1000 INJECTION, SOLUTION INTRAVENOUS
Qty: 0 | Refills: 0 | Status: DISCONTINUED | OUTPATIENT
Start: 2018-12-18 | End: 2018-12-19

## 2018-12-18 RX ORDER — GABAPENTIN 400 MG/1
300 CAPSULE ORAL
Qty: 0 | Refills: 0 | Status: DISCONTINUED | OUTPATIENT
Start: 2018-12-18 | End: 2018-12-20

## 2018-12-18 RX ADMIN — HYDROMORPHONE HYDROCHLORIDE 0.5 MILLIGRAM(S): 2 INJECTION INTRAMUSCULAR; INTRAVENOUS; SUBCUTANEOUS at 21:00

## 2018-12-18 RX ADMIN — DEXMEDETOMIDINE HYDROCHLORIDE IN 0.9% SODIUM CHLORIDE 11.34 MICROGRAM(S)/KG/HR: 4 INJECTION INTRAVENOUS at 23:59

## 2018-12-18 RX ADMIN — HYDROMORPHONE HYDROCHLORIDE 0.5 MILLIGRAM(S): 2 INJECTION INTRAMUSCULAR; INTRAVENOUS; SUBCUTANEOUS at 13:45

## 2018-12-18 RX ADMIN — PIPERACILLIN AND TAZOBACTAM 25 GRAM(S): 4; .5 INJECTION, POWDER, LYOPHILIZED, FOR SOLUTION INTRAVENOUS at 05:20

## 2018-12-18 RX ADMIN — CHLORHEXIDINE GLUCONATE 15 MILLILITER(S): 213 SOLUTION TOPICAL at 13:30

## 2018-12-18 RX ADMIN — HYDROMORPHONE HYDROCHLORIDE 0.5 MILLIGRAM(S): 2 INJECTION INTRAMUSCULAR; INTRAVENOUS; SUBCUTANEOUS at 09:47

## 2018-12-18 RX ADMIN — GABAPENTIN 300 MILLIGRAM(S): 400 CAPSULE ORAL at 22:34

## 2018-12-18 RX ADMIN — Medication 200 GRAM(S): at 04:10

## 2018-12-18 RX ADMIN — ENOXAPARIN SODIUM 40 MILLIGRAM(S): 100 INJECTION SUBCUTANEOUS at 12:01

## 2018-12-18 RX ADMIN — Medication 50 GRAM(S): at 05:28

## 2018-12-18 RX ADMIN — PIPERACILLIN AND TAZOBACTAM 25 GRAM(S): 4; .5 INJECTION, POWDER, LYOPHILIZED, FOR SOLUTION INTRAVENOUS at 22:09

## 2018-12-18 RX ADMIN — SODIUM CHLORIDE 50 MILLILITER(S): 9 INJECTION, SOLUTION INTRAVENOUS at 09:23

## 2018-12-18 RX ADMIN — DEXMEDETOMIDINE HYDROCHLORIDE IN 0.9% SODIUM CHLORIDE 11.34 MICROGRAM(S)/KG/HR: 4 INJECTION INTRAVENOUS at 09:23

## 2018-12-18 RX ADMIN — VASOPRESSIN 1.8 UNIT(S)/MIN: 20 INJECTION INTRAVENOUS at 21:35

## 2018-12-18 RX ADMIN — Medication 25.51 MICROGRAM(S)/KG/MIN: at 21:34

## 2018-12-18 RX ADMIN — QUETIAPINE FUMARATE 25 MILLIGRAM(S): 200 TABLET, FILM COATED ORAL at 22:34

## 2018-12-18 RX ADMIN — CHLORHEXIDINE GLUCONATE 1 APPLICATION(S): 213 SOLUTION TOPICAL at 05:21

## 2018-12-18 RX ADMIN — SODIUM CHLORIDE 50 MILLILITER(S): 9 INJECTION, SOLUTION INTRAVENOUS at 21:36

## 2018-12-18 RX ADMIN — VASOPRESSIN 1.8 UNIT(S)/MIN: 20 INJECTION INTRAVENOUS at 09:23

## 2018-12-18 RX ADMIN — HYDROMORPHONE HYDROCHLORIDE 0.5 MILLIGRAM(S): 2 INJECTION INTRAMUSCULAR; INTRAVENOUS; SUBCUTANEOUS at 02:16

## 2018-12-18 RX ADMIN — CHLORHEXIDINE GLUCONATE 15 MILLILITER(S): 213 SOLUTION TOPICAL at 22:09

## 2018-12-18 RX ADMIN — CHLORHEXIDINE GLUCONATE 15 MILLILITER(S): 213 SOLUTION TOPICAL at 05:26

## 2018-12-18 RX ADMIN — Medication 125 MILLIMOLE(S): at 09:22

## 2018-12-18 RX ADMIN — PANTOPRAZOLE SODIUM 40 MILLIGRAM(S): 20 TABLET, DELAYED RELEASE ORAL at 12:00

## 2018-12-18 RX ADMIN — Medication 300 MILLIGRAM(S): at 12:50

## 2018-12-18 RX ADMIN — HYDROMORPHONE HYDROCHLORIDE 0.5 MILLIGRAM(S): 2 INJECTION INTRAMUSCULAR; INTRAVENOUS; SUBCUTANEOUS at 01:51

## 2018-12-18 RX ADMIN — Medication 25.51 MICROGRAM(S)/KG/MIN: at 09:23

## 2018-12-18 RX ADMIN — Medication 50 GRAM(S): at 04:27

## 2018-12-18 RX ADMIN — HEPARIN SODIUM 5000 UNIT(S): 5000 INJECTION INTRAVENOUS; SUBCUTANEOUS at 05:21

## 2018-12-18 RX ADMIN — HYDROMORPHONE HYDROCHLORIDE 0.5 MILLIGRAM(S): 2 INJECTION INTRAMUSCULAR; INTRAVENOUS; SUBCUTANEOUS at 10:03

## 2018-12-18 RX ADMIN — DEXMEDETOMIDINE HYDROCHLORIDE IN 0.9% SODIUM CHLORIDE 11.34 MICROGRAM(S)/KG/HR: 4 INJECTION INTRAVENOUS at 21:35

## 2018-12-18 RX ADMIN — HYDROMORPHONE HYDROCHLORIDE 0.5 MILLIGRAM(S): 2 INJECTION INTRAMUSCULAR; INTRAVENOUS; SUBCUTANEOUS at 13:30

## 2018-12-18 RX ADMIN — HYDROMORPHONE HYDROCHLORIDE 0.5 MILLIGRAM(S): 2 INJECTION INTRAMUSCULAR; INTRAVENOUS; SUBCUTANEOUS at 20:41

## 2018-12-18 RX ADMIN — PIPERACILLIN AND TAZOBACTAM 25 GRAM(S): 4; .5 INJECTION, POWDER, LYOPHILIZED, FOR SOLUTION INTRAVENOUS at 13:30

## 2018-12-18 NOTE — PROGRESS NOTE ADULT - SUBJECTIVE AND OBJECTIVE BOX
SICU PROGRESS NOTE  ================================================  24 HOUR EVENTS:  -patient became febrile and tachycardic on the floor with altered mental status, transferred to SICU due to concerns for sepsis  -CT A/P consistent with acute cholecystitis  -patient started on precedex, became hypotensive requiring levophed  -ST depression noted on EKG. Troponins 300 --> 800. On repeat EKG, ST depression resolved. COntinuing to trend troponin  -IR placed percutaneous cholecystostomy tube with approx. 80 cc purulent output    HPI:  BRADLY MOORE is a [place] speaking 73y Male with PMH of CABGx4 2016,  PAD s/pp LLE stent 2017 on ASA/Plavix, Pletal, remote appendectomy, HTN, DM, HLD who presents with abdominal pain since 2AM on 12/16/18. The pain is worst in RUQ. He endorses nausea but no vomiting. He is having chills. He had a similar episode 2 weeks ago and was worked up without avail. Last PO intake 12/16/18 at 10am, just a small piece of bread. He denies CP/SOB/palpitations. He took Plavix on the morning of 12/16. Pt was on 9 Primitivo treated with IV antibiotics for acute cholecystitis. On the morning of 12/17 pt became tachycardic to 130s with fevers and rigors and a decrease in BP (101 systolic from 130s). He was also altered. Pt developed diarrhea and C. diff sample sent as well. SICU consult called for closer monitoring. Plan for pt to go to CT abdomen/pelvis and then plan for IR for perc huy vs OR for cholecystectomy. Upon arrival to SICU, pt self D/Yovani IV and very restless and agitated. Pt also tachypneic, with O2 sats in low 90s. Decision was made to intubate patient to secure airway. Pt then became hypotensive and received 1.5 L LR fluid bolus. Left radial arterial line and right IJ central line placed, pt started on vasopressors. Pt's daughter, Tamra, initially at bedside and aware of pt's transfer to SICU. She was called and made aware of intubation, central line.    Objective:  Vital Signs  ICU Vital Signs Last 24 Hrs  T(C): 37.3 (17 Dec 2018 15:00), Max: 39 (17 Dec 2018 01:34)  T(F): 99.1 (17 Dec 2018 15:00), Max: 102.2 (17 Dec 2018 01:34)  HR: 74 (18 Dec 2018 01:15) (74 - 135)  BP: 186/84 (17 Dec 2018 14:15) (64/46 - 186/86)  BP(mean): 121 (17 Dec 2018 14:15) (51 - 123)  ABP: 145/65 (18 Dec 2018 01:15) (79/52 - 183/83)  ABP(mean): 92 (18 Dec 2018 01:15) (62 - 121)  RR: 26 (18 Dec 2018 01:15) (7 - 35)  SpO2: 100% (18 Dec 2018 01:15) (83% - 100%)    I&O's Detail    16 Dec 2018 07:01  -  17 Dec 2018 07:00  --------------------------------------------------------  IN:    lactated ringers.: 1375 mL    Sodium Chloride 0.9% IV Bolus: 500 mL    Solution: 100 mL  Total IN: 1975 mL    OUT:    Voided: 900 mL  Total OUT: 900 mL    Total NET: 1075 mL      17 Dec 2018 07:01  -  18 Dec 2018 01:26  --------------------------------------------------------  IN:    Lactated Ringers IV Bolus: 1000 mL    lactated ringers.: 1875 mL    norepinephrine Infusion: 697 mL    norepinephrine Infusion: 51 mL    Solution: 250 mL    Solution: 50 mL    Solution: 250 mL    Solution: 400 mL    Solution: 200 mL    Solution: 200 mL    vasopressin Infusion: 23.4 mL  Total IN: 4996.4 mL    OUT:    Indwelling Catheter - Urethral: 835 mL  Total OUT: 835 mL    Total NET: 4161.4 mL    Diet: Diet, NPO (12-16-18 @ 17:32)    MEDICATIONS  (STANDING):  chlorhexidine 0.12% Liquid 15 milliLiter(s) Oral Mucosa <User Schedule>  chlorhexidine 4% Liquid 1 Application(s) Topical <User Schedule>  dexmedetomidine Infusion 0.5 MICROgram(s)/kG/Hr (11.338 mL/Hr) IV Continuous <Continuous>  heparin  Injectable 5000 Unit(s) SubCutaneous every 8 hours  insulin lispro (HumaLOG) corrective regimen sliding scale   SubCutaneous every 6 hours  investigational medication - general 50 milliGRAM(s) IV Push every 6 hours  investigational medication - IVPB 1500 milliGRAM(s) IV Intermittent every 6 hours  lactated ringers. 1000 milliLiter(s) (125 mL/Hr) IV Continuous <Continuous>  norepinephrine Infusion 0.3 MICROgram(s)/kG/Min (25.509 mL/Hr) IV Continuous <Continuous>  pantoprazole  Injectable 40 milliGRAM(s) IV Push daily  piperacillin/tazobactam IVPB. 3.375 Gram(s) IV Intermittent every 8 hours  vasopressin Infusion 0.03 Unit(s)/Min (1.8 mL/Hr) IV Continuous <Continuous>    MEDICATIONS  (PRN):  HYDROmorphone  Injectable 0.5 milliGRAM(s) IV Push every 3 hours PRN pain      PHYSICAL EXAM:  GEN: NAD, resting quietly, intubated, sedated  NEURO: alert, CN II-XII grossly intact, no focal deficits  PULM: symmetric chest rise bilaterally, no increased WOB  CV: regular rate, peripheral pulses intact  ABD: soft, NTND, PCT with bilious output  EXTR: no cyanosis or edema, moving all extremities    LABS  CBC (12-17 @ 23:06)                              14.6                           27.5<H>  )----------------(  142<L>     --    % Neutrophils, --    % Lymphocytes, ANC: --                                  43.1                CBC (12-17 @ 16:49)                              14.5                           27.4<H>  )----------------(  139<L>     --    % Neutrophils, --    % Lymphocytes, ANC: --                                  43.4                  BMP (12-17 @ 23:06)             136     |  104     |  14    		Ca++ --      Ca 7.9<L>             ---------------------------------( 150<H>		Mg 1.7                3.9     |  14<L>   |  1.46<H>			Ph 3.1     BMP (12-17 @ 16:49)             137     |  101     |  15    		Ca++ --      Ca 8.4                ---------------------------------( 128<H>		Mg --                 3.0<L>  |  15<L>   |  1.60<H>			Ph --        LFTs (12-17 @ 23:06)      TPro 6.2 / Alb 2.9<L> / TBili 0.5 / DBili 0.3<H> / <H> / <H> / AlkPhos 118  LFTs (12-17 @ 16:49)      TPro 6.4 / Alb 3.3 / TBili 0.7 / DBili -- / <H> / <H> / AlkPhos 127<H>    Coags (12-17 @ 09:48)  aPTT 26.5<L> / INR 1.41<H> / PT 16.2<H>    ABG (12-17 @ 18:53)     7.34<L> / 33 / 92 / 18<L> / -6.9<L> / 96%     Lactate:     ABG (12-17 @ 16:44)     7.36 / 31<L> / 98 / 17<L> / -6.7<L> / 97<H>%     Lactate:       VBG (12-17 @ 13:21)     7.35 / 38 / 43 / 20<L> / -4.2<L> / 74%    -> .Urine Clean Catch (Midstream) Culture (12-16 @ 18:08)     NG    NG    <10,000 CFU/ml  Normal Urogenital taylor present    -> .Blood Blood Culture (12-16 @ 18:01)     NG    NG    No growth to date.        CULTURES  .Urine Clean Catch (Midstream)  12-16 @ 18:08   <10,000 CFU/ml  Normal Urogenital taylor present  --  --      .Blood Blood  12-16 @ 18:01   No growth to date.  --  --    IMAGING:   CXR pending

## 2018-12-18 NOTE — PROGRESS NOTE ADULT - SUBJECTIVE AND OBJECTIVE BOX
Interventional Radiology Follow- Up Note    This is a 74y/o male with below PMH who presented on 12/16 with RUQ pain found to have acute cholecystitis. Patient was found to have AMS, rigors, tachycardia and hypotension yesterday was transferred to SICU intubated started on pressors and IR was consulted for perc huy for which he underwent with Dr. Berry.     Pt seen and examined at bedside in SICU. Remains intubated on pressors with norepinephrine 0.15 and vasopressor 0.03.     PAST MEDICAL & SURGICAL HISTORY:  DM (diabetes mellitus)  HLD (hyperlipidemia)  HTN (hypertension)  CAD (coronary artery disease)  History of appendectomy  S/P CABG x 4      Allergies: No Known Allergies    LABS:                        14.0   15.5  )-----------( x        ( 18 Dec 2018 09:48 )             41.2     12-18    135  |  104  |  13  ----------------------------<  152<H>  4.4   |  15<L>  |  1.33<H>    Ca    7.8<L>      18 Dec 2018 02:49  Phos  2.8     12-18  Mg     1.6     12-18    TPro  6.2  /  Alb  2.8<L>  /  TBili  0.5  /  DBili  0.3<H>  /  AST  256<H>  /  ALT  249<H>  /  AlkPhos  114  12-18    PT/INR - ( 18 Dec 2018 02:49 )   PT: 16.7 sec;   INR: 1.45 ratio         PTT - ( 18 Dec 2018 02:49 )  PTT:31.3 sec      Bile Cultures (12/17): pending, gram stain with gram neg rods    Vitals: T(F): 99.8 (12-18-18 @ 07:00), Max: 99.8 (12-18-18 @ 07:00)  HR: 75 (12-18-18 @ 10:00) (71 - 109)  BP: 186/84 (12-17-18 @ 14:15) (64/46 - 186/86)  RR: 24 (12-18-18 @ 10:00) (0 - 33)  SpO2: 94% (12-18-18 @ 10:00) (83% - 100%)    PHYSICAL EXAM:  General: intubated, awake follows command  Abd: ND, soft, TTP on RUQ, huy tube intact to drainage bag with dark cloudy brown output. 60cc since drainage. dressing c/d/i     A/P: 73y Male admitted with cholecystitis s/p perc huy on 12/17 with Dr. Berry.  -WBC trending down 20.9-->15.5, lactate down to 1.8  -Continue to trend vitals/ labs  -F/U bile culture  -Wean to extubate as tolerated  -Wean off pressors as tolerated  -continue to monitor output    -flush drain per doctor orders, 10cc NS daily  -Continue global care per primary/ SICU team  -will discuss with IR attending     Please call IR at extension [phone] with any questions, concerns, or issues regarding above.

## 2018-12-18 NOTE — PROGRESS NOTE ADULT - SUBJECTIVE AND OBJECTIVE BOX
Surgery Progress Note    S: Patient seen and examined. No acute events overnight. Patient remains intubated and on pressors this AM. patient underwent successful perc huy yesterday.     O:  Vital Signs Last 24 Hrs  T(C): 37.2 (18 Dec 2018 11:00), Max: 37.7 (18 Dec 2018 07:00)  T(F): 98.9 (18 Dec 2018 11:00), Max: 99.8 (18 Dec 2018 07:00)  HR: 70 (18 Dec 2018 11:45) (70 - 109)  BP: 114/69 (18 Dec 2018 10:00) (114/69 - 186/86)  BP(mean): 87 (18 Dec 2018 10:00) (87 - 123)  RR: 24 (18 Dec 2018 11:45) (0 - 33)  SpO2: 100% (18 Dec 2018 11:45) (83% - 100%)    I&O's Detail    17 Dec 2018 07:01  -  18 Dec 2018 07:00  --------------------------------------------------------  IN:    dexmedetomidine Infusion: 166.8 mL    Lactated Ringers IV Bolus: 1000 mL    lactated ringers.: 2625 mL    norepinephrine Infusion: 165.9 mL    norepinephrine Infusion: 697 mL    Solution: 300 mL    Solution: 100 mL    Solution: 250 mL    Solution: 150 mL    Solution: 250 mL    Solution: 250 mL    Solution: 400 mL    vasopressin Infusion: 34.2 mL  Total IN: 6388.9 mL    OUT:    Drain: 60 mL    Indwelling Catheter - Urethral: 1160 mL    Nasoenteral Tube: 1200 mL  Total OUT: 2420 mL    Total NET: 3968.9 mL      18 Dec 2018 07:01  -  18 Dec 2018 12:11  --------------------------------------------------------  IN:    dexmedetomidine Infusion: 90.4 mL    lactated ringers.: 500 mL    norepinephrine Infusion: 42.6 mL    Solution: 100 mL    Solution: 250 mL    vasopressin Infusion: 7.2 mL  Total IN: 990.2 mL    OUT:    Indwelling Catheter - Urethral: 260 mL  Total OUT: 260 mL    Total NET: 730.2 mL          MEDICATIONS  (STANDING):  aspirin Suppository 300 milliGRAM(s) Rectal daily  chlorhexidine 0.12% Liquid 15 milliLiter(s) Oral Mucosa <User Schedule>  chlorhexidine 4% Liquid 1 Application(s) Topical <User Schedule>  dexmedetomidine Infusion 0.5 MICROgram(s)/kG/Hr (11.338 mL/Hr) IV Continuous <Continuous>  enoxaparin Injectable 40 milliGRAM(s) SubCutaneous every 24 hours  insulin lispro (HumaLOG) corrective regimen sliding scale   SubCutaneous every 6 hours  investigational medication - general 50 milliGRAM(s) IV Push every 6 hours  investigational medication - IVPB 1500 milliGRAM(s) IV Intermittent every 6 hours  lactated ringers. 1000 milliLiter(s) (50 mL/Hr) IV Continuous <Continuous>  norepinephrine Infusion 0.3 MICROgram(s)/kG/Min (25.509 mL/Hr) IV Continuous <Continuous>  pantoprazole  Injectable 40 milliGRAM(s) IV Push daily  piperacillin/tazobactam IVPB. 3.375 Gram(s) IV Intermittent every 8 hours  vasopressin Infusion 0.03 Unit(s)/Min (1.8 mL/Hr) IV Continuous <Continuous>    MEDICATIONS  (PRN):  HYDROmorphone  Injectable 0.5 milliGRAM(s) IV Push every 3 hours PRN pain                            14.0   15.5  )-----------( 96       ( 18 Dec 2018 09:48 )             41.2       12-18    133<L>  |  102  |  12  ----------------------------<  150<H>  4.4   |  15<L>  |  1.30    Ca    8.3<L>      18 Dec 2018 09:48  Phos  2.6     12-18  Mg     2.2     12-18    TPro  6.1  /  Alb  2.8<L>  /  TBili  0.6  /  DBili  0.2  /  AST  294<H>  /  ALT  303<H>  /  AlkPhos  112  12-18      Physical Exam:  Gen: Laying in bed, NAD, intubated  Resp: Unlabored breathing on ventilator  Abd: soft, NTND, RUQ drain with murky bilious output, no rebound or guarding  Ext: WWP  Skin: No rashes

## 2018-12-18 NOTE — CHART NOTE - NSCHARTNOTEFT_GEN_A_CORE
Surgery Post op Note    Procedure: IR Percutaneous Cholecystostomy     SUBJECTIVE: Patient seen and evaluated at the bedside at 8:45 PM. Patient intubated. On levo 0.3 ane vaso 0.03. Awake. [place] speaking, follows simple commands in English. On palpation endorses pain in RUQ.  Velasco catheter in place.        Objective:   Vital Signs Last 24 Hrs  T(C): 37.3 (17 Dec 2018 15:00), Max: 37.9 (17 Dec 2018 06:59)  T(F): 99.1 (17 Dec 2018 15:00), Max: 100.2 (17 Dec 2018 06:59)  HR: 76 (18 Dec 2018 02:45) (74 - 135)  BP: 186/84 (17 Dec 2018 14:15) (64/46 - 186/86)  BP(mean): 121 (17 Dec 2018 14:15) (51 - 123)  RR: 26 (18 Dec 2018 02:45) (7 - 35)  SpO2: 97% (18 Dec 2018 02:45) (83% - 100%)  I&O's Summary    16 Dec 2018 07:  -  17 Dec 2018 07:00  --------------------------------------------------------  IN: 1975 mL / OUT: 900 mL / NET: 1075 mL    17 Dec 2018 07:  -  18 Dec 2018 02:51  --------------------------------------------------------  IN: 5148.7 mL / OUT: 835 mL / NET: 4313.7 mL      I&O's Detail    16 Dec 2018 07:  -  17 Dec 2018 07:00  --------------------------------------------------------  IN:    lactated ringers.: 1375 mL    Sodium Chloride 0.9% IV Bolus: 500 mL    Solution: 100 mL  Total IN: 1975 mL    OUT:    Voided: 900 mL  Total OUT: 900 mL    Total NET: 1075 mL      17 Dec 2018 07:01  -  18 Dec 2018 02:51  --------------------------------------------------------  IN:    Lactated Ringers IV Bolus: 1000 mL    lactated ringers.: 2000 mL    norepinephrine Infusion: 76.5 mL    norepinephrine Infusion: 697 mL    Solution: 250 mL    Solution: 50 mL    Solution: 250 mL    Solution: 400 mL    Solution: 200 mL    Solution: 200 mL    vasopressin Infusion: 25.2 mL  Total IN: 5148.7 mL    OUT:    Indwelling Catheter - Urethral: 835 mL  Total OUT: 835 mL    Total NET: 4313.7 mL        LABS:                        14.6   27.5  )-----------( 142      ( 17 Dec 2018 23:06 )             43.1         136  |  104  |  14  ----------------------------<  150<H>  3.9   |  14<L>  |  1.46<H>    Ca    7.9<L>      17 Dec 2018 23:06  Phos  3.1       Mg     1.7         TPro  6.2  /  Alb  2.9<L>  /  TBili  0.5  /  DBili  0.3<H>  /  AST  223<H>  /  ALT  213<H>  /  AlkPhos  118      PT/INR - ( 17 Dec 2018 09:48 )   PT: 16.2 sec;   INR: 1.41 ratio         PTT - ( 17 Dec 2018 09:48 )  PTT:26.5 sec  Urinalysis Basic - ( 16 Dec 2018 14:42 )    Color: Light Yellow / Appearance: Clear / S.016 / pH: x  Gluc: x / Ketone: Negative  / Bili: Negative / Urobili: Negative   Blood: x / Protein: Negative / Nitrite: Negative   Leuk Esterase: Negative / RBC: 1 /hpf / WBC 0 /hpf   Sq Epi: x / Non Sq Epi: 0 /hpf / Bacteria: Negative        MEDICATIONS  (STANDING):  chlorhexidine 0.12% Liquid 15 milliLiter(s) Oral Mucosa <User Schedule>  chlorhexidine 4% Liquid 1 Application(s) Topical <User Schedule>  dexmedetomidine Infusion 0.5 MICROgram(s)/kG/Hr (11.338 mL/Hr) IV Continuous <Continuous>  heparin  Injectable 5000 Unit(s) SubCutaneous every 8 hours  insulin lispro (HumaLOG) corrective regimen sliding scale   SubCutaneous every 6 hours  investigational medication - general 50 milliGRAM(s) IV Push every 6 hours  investigational medication - IVPB 1500 milliGRAM(s) IV Intermittent every 6 hours  lactated ringers. 1000 milliLiter(s) (125 mL/Hr) IV Continuous <Continuous>  norepinephrine Infusion 0.3 MICROgram(s)/kG/Min (25.509 mL/Hr) IV Continuous <Continuous>  pantoprazole  Injectable 40 milliGRAM(s) IV Push daily  piperacillin/tazobactam IVPB. 3.375 Gram(s) IV Intermittent every 8 hours  vasopressin Infusion 0.03 Unit(s)/Min (1.8 mL/Hr) IV Continuous <Continuous>    MEDICATIONS  (PRN):  HYDROmorphone  Injectable 0.5 milliGRAM(s) IV Push every 3 hours PRN pain      Physical exam  General: NAD, intubated, awake   Abdomen: soft, TTP in right upper quadrant, non distended, per huy tube with serosanguineous output     Assessment/Plan:    73M w/ h/o CABG x4, CAD on ASA/plavix/pletal, HLD, HTN, h/o appendectomy presents with likely acute cholecystitis now with respiratory distress and rigors requiring intubation and SICU care. Status post IR percutaneous cholecystostomy 18.     - Pain medication as needed   - DVT ppx: heparin subq   - wean to extubate  - wean pressors as tolerable   - appreciate excellent care per SICU team     Dinah Lynch PA-C   p [phone] ATP

## 2018-12-19 LAB
-  AMIKACIN: SIGNIFICANT CHANGE UP
-  AMOXICILLIN/CLAVULANIC ACID: SIGNIFICANT CHANGE UP
-  AMPICILLIN/SULBACTAM: SIGNIFICANT CHANGE UP
-  AMPICILLIN: SIGNIFICANT CHANGE UP
-  AZTREONAM: SIGNIFICANT CHANGE UP
-  CEFAZOLIN: SIGNIFICANT CHANGE UP
-  CEFEPIME: SIGNIFICANT CHANGE UP
-  CEFOXITIN: SIGNIFICANT CHANGE UP
-  CEFTRIAXONE: SIGNIFICANT CHANGE UP
-  CIPROFLOXACIN: SIGNIFICANT CHANGE UP
-  ERTAPENEM: SIGNIFICANT CHANGE UP
-  GENTAMICIN: SIGNIFICANT CHANGE UP
-  IMIPENEM: SIGNIFICANT CHANGE UP
-  LEVOFLOXACIN: SIGNIFICANT CHANGE UP
-  MEROPENEM: SIGNIFICANT CHANGE UP
-  PIPERACILLIN/TAZOBACTAM: SIGNIFICANT CHANGE UP
-  TOBRAMYCIN: SIGNIFICANT CHANGE UP
-  TRIMETHOPRIM/SULFAMETHOXAZOLE: SIGNIFICANT CHANGE UP
ALBUMIN SERPL ELPH-MCNC: 2.5 G/DL — LOW (ref 3.3–5)
ALBUMIN SERPL ELPH-MCNC: 2.5 G/DL — LOW (ref 3.3–5)
ALP SERPL-CCNC: 85 U/L — SIGNIFICANT CHANGE UP (ref 40–120)
ALP SERPL-CCNC: 91 U/L — SIGNIFICANT CHANGE UP (ref 40–120)
ALT FLD-CCNC: 219 U/L — HIGH (ref 10–45)
ALT FLD-CCNC: 246 U/L — HIGH (ref 10–45)
ANION GAP SERPL CALC-SCNC: 12 MMOL/L — SIGNIFICANT CHANGE UP (ref 5–17)
ANION GAP SERPL CALC-SCNC: 14 MMOL/L — SIGNIFICANT CHANGE UP (ref 5–17)
APTT BLD: 31.2 SEC — SIGNIFICANT CHANGE UP (ref 27.5–36.3)
AST SERPL-CCNC: 126 U/L — HIGH (ref 10–40)
AST SERPL-CCNC: 170 U/L — HIGH (ref 10–40)
BASE EXCESS BLDV CALC-SCNC: -1.9 MMOL/L — SIGNIFICANT CHANGE UP (ref -2–2)
BILIRUB DIRECT SERPL-MCNC: 0.1 MG/DL — SIGNIFICANT CHANGE UP (ref 0–0.2)
BILIRUB DIRECT SERPL-MCNC: 0.2 MG/DL — SIGNIFICANT CHANGE UP (ref 0–0.2)
BILIRUB INDIRECT FLD-MCNC: 0.2 MG/DL — SIGNIFICANT CHANGE UP (ref 0.2–1)
BILIRUB INDIRECT FLD-MCNC: 0.3 MG/DL — SIGNIFICANT CHANGE UP (ref 0.2–1)
BILIRUB SERPL-MCNC: 0.4 MG/DL — SIGNIFICANT CHANGE UP (ref 0.2–1.2)
BILIRUB SERPL-MCNC: 0.4 MG/DL — SIGNIFICANT CHANGE UP (ref 0.2–1.2)
BUN SERPL-MCNC: 18 MG/DL — SIGNIFICANT CHANGE UP (ref 7–23)
BUN SERPL-MCNC: 18 MG/DL — SIGNIFICANT CHANGE UP (ref 7–23)
CA-I BLD-SCNC: 1.05 MMOL/L — LOW (ref 1.12–1.3)
CALCIUM SERPL-MCNC: 7.5 MG/DL — LOW (ref 8.4–10.5)
CALCIUM SERPL-MCNC: 7.5 MG/DL — LOW (ref 8.4–10.5)
CHLORIDE SERPL-SCNC: 101 MMOL/L — SIGNIFICANT CHANGE UP (ref 96–108)
CHLORIDE SERPL-SCNC: 103 MMOL/L — SIGNIFICANT CHANGE UP (ref 96–108)
CO2 BLDV-SCNC: 24 MMOL/L — SIGNIFICANT CHANGE UP (ref 22–30)
CO2 SERPL-SCNC: 18 MMOL/L — LOW (ref 22–31)
CO2 SERPL-SCNC: 18 MMOL/L — LOW (ref 22–31)
CREAT SERPL-MCNC: 1.21 MG/DL — SIGNIFICANT CHANGE UP (ref 0.5–1.3)
CREAT SERPL-MCNC: 1.25 MG/DL — SIGNIFICANT CHANGE UP (ref 0.5–1.3)
GAS PNL BLDA: SIGNIFICANT CHANGE UP
GAS PNL BLDA: SIGNIFICANT CHANGE UP
GAS PNL BLDV: SIGNIFICANT CHANGE UP
GLUCOSE BLDC GLUCOMTR-MCNC: 108 MG/DL — HIGH (ref 70–99)
GLUCOSE BLDC GLUCOMTR-MCNC: 114 MG/DL — HIGH (ref 70–99)
GLUCOSE BLDC GLUCOMTR-MCNC: 132 MG/DL — HIGH (ref 70–99)
GLUCOSE SERPL-MCNC: 149 MG/DL — HIGH (ref 70–99)
GLUCOSE SERPL-MCNC: 191 MG/DL — HIGH (ref 70–99)
HCO3 BLDV-SCNC: 23 MMOL/L — SIGNIFICANT CHANGE UP (ref 21–29)
HCT VFR BLD CALC: 35.5 % — LOW (ref 39–50)
HCT VFR BLD CALC: 36.5 % — LOW (ref 39–50)
HGB BLD-MCNC: 12.3 G/DL — LOW (ref 13–17)
HGB BLD-MCNC: 12.8 G/DL — LOW (ref 13–17)
HOROWITZ INDEX BLDV+IHG-RTO: 40 — SIGNIFICANT CHANGE UP
INR BLD: 1.34 RATIO — HIGH (ref 0.88–1.16)
MAGNESIUM SERPL-MCNC: 2 MG/DL — SIGNIFICANT CHANGE UP (ref 1.6–2.6)
MAGNESIUM SERPL-MCNC: 2 MG/DL — SIGNIFICANT CHANGE UP (ref 1.6–2.6)
MCHC RBC-ENTMCNC: 30 PG — SIGNIFICANT CHANGE UP (ref 27–34)
MCHC RBC-ENTMCNC: 30.2 PG — SIGNIFICANT CHANGE UP (ref 27–34)
MCHC RBC-ENTMCNC: 34.7 GM/DL — SIGNIFICANT CHANGE UP (ref 32–36)
MCHC RBC-ENTMCNC: 35 GM/DL — SIGNIFICANT CHANGE UP (ref 32–36)
MCV RBC AUTO: 86.5 FL — SIGNIFICANT CHANGE UP (ref 80–100)
MCV RBC AUTO: 86.5 FL — SIGNIFICANT CHANGE UP (ref 80–100)
METHOD TYPE: SIGNIFICANT CHANGE UP
PCO2 BLDV: 40 MMHG — SIGNIFICANT CHANGE UP (ref 35–50)
PF4 HEPARIN CMPLX AB SER-ACNC: NEGATIVE — SIGNIFICANT CHANGE UP
PF4 HEPARIN CMPLX AB SERPL QL IA: 0.24 ABS — SIGNIFICANT CHANGE UP
PH BLDV: 7.37 — SIGNIFICANT CHANGE UP (ref 7.35–7.45)
PHOSPHATE SERPL-MCNC: 1.9 MG/DL — LOW (ref 2.5–4.5)
PHOSPHATE SERPL-MCNC: 3.5 MG/DL — SIGNIFICANT CHANGE UP (ref 2.5–4.5)
PLATELET # BLD AUTO: 74 K/UL — LOW (ref 150–400)
PLATELET # BLD AUTO: 76 K/UL — LOW (ref 150–400)
PO2 BLDV: 35 MMHG — SIGNIFICANT CHANGE UP (ref 25–45)
POTASSIUM SERPL-MCNC: 4 MMOL/L — SIGNIFICANT CHANGE UP (ref 3.5–5.3)
POTASSIUM SERPL-MCNC: 4.3 MMOL/L — SIGNIFICANT CHANGE UP (ref 3.5–5.3)
POTASSIUM SERPL-SCNC: 4 MMOL/L — SIGNIFICANT CHANGE UP (ref 3.5–5.3)
POTASSIUM SERPL-SCNC: 4.3 MMOL/L — SIGNIFICANT CHANGE UP (ref 3.5–5.3)
PROCALCITONIN SERPL-MCNC: 36.45 NG/ML — HIGH (ref 0.02–0.1)
PROT SERPL-MCNC: 5.4 G/DL — LOW (ref 6–8.3)
PROT SERPL-MCNC: 5.7 G/DL — LOW (ref 6–8.3)
PROTHROM AB SERPL-ACNC: 15.5 SEC — HIGH (ref 10–12.9)
RBC # BLD: 4.11 M/UL — LOW (ref 4.2–5.8)
RBC # BLD: 4.23 M/UL — SIGNIFICANT CHANGE UP (ref 4.2–5.8)
RBC # FLD: 12.9 % — SIGNIFICANT CHANGE UP (ref 10.3–14.5)
RBC # FLD: 13.6 % — SIGNIFICANT CHANGE UP (ref 10.3–14.5)
SAO2 % BLDV: 59 % — LOW (ref 67–88)
SODIUM SERPL-SCNC: 131 MMOL/L — LOW (ref 135–145)
SODIUM SERPL-SCNC: 135 MMOL/L — SIGNIFICANT CHANGE UP (ref 135–145)
TROPONIN T, HIGH SENSITIVITY RESULT: 546 NG/L — HIGH (ref 0–51)
WBC # BLD: 8.3 K/UL — SIGNIFICANT CHANGE UP (ref 3.8–10.5)
WBC # BLD: 8.7 K/UL — SIGNIFICANT CHANGE UP (ref 3.8–10.5)
WBC # FLD AUTO: 8.3 K/UL — SIGNIFICANT CHANGE UP (ref 3.8–10.5)
WBC # FLD AUTO: 8.7 K/UL — SIGNIFICANT CHANGE UP (ref 3.8–10.5)

## 2018-12-19 PROCEDURE — 71045 X-RAY EXAM CHEST 1 VIEW: CPT | Mod: 26

## 2018-12-19 PROCEDURE — 93010 ELECTROCARDIOGRAM REPORT: CPT

## 2018-12-19 PROCEDURE — 99291 CRITICAL CARE FIRST HOUR: CPT

## 2018-12-19 PROCEDURE — 99231 SBSQ HOSP IP/OBS SF/LOW 25: CPT

## 2018-12-19 RX ORDER — CALCIUM GLUCONATE 100 MG/ML
1 VIAL (ML) INTRAVENOUS ONCE
Qty: 0 | Refills: 0 | Status: COMPLETED | OUTPATIENT
Start: 2018-12-19 | End: 2018-12-19

## 2018-12-19 RX ORDER — FUROSEMIDE 40 MG
20 TABLET ORAL ONCE
Qty: 0 | Refills: 0 | Status: COMPLETED | OUTPATIENT
Start: 2018-12-19 | End: 2018-12-19

## 2018-12-19 RX ORDER — POTASSIUM PHOSPHATE, MONOBASIC POTASSIUM PHOSPHATE, DIBASIC 236; 224 MG/ML; MG/ML
30 INJECTION, SOLUTION INTRAVENOUS ONCE
Qty: 0 | Refills: 0 | Status: DISCONTINUED | OUTPATIENT
Start: 2018-12-19 | End: 2018-12-19

## 2018-12-19 RX ADMIN — PANTOPRAZOLE SODIUM 40 MILLIGRAM(S): 20 TABLET, DELAYED RELEASE ORAL at 13:12

## 2018-12-19 RX ADMIN — QUETIAPINE FUMARATE 25 MILLIGRAM(S): 200 TABLET, FILM COATED ORAL at 23:06

## 2018-12-19 RX ADMIN — QUETIAPINE FUMARATE 25 MILLIGRAM(S): 200 TABLET, FILM COATED ORAL at 09:12

## 2018-12-19 RX ADMIN — HYDROMORPHONE HYDROCHLORIDE 0.5 MILLIGRAM(S): 2 INJECTION INTRAMUSCULAR; INTRAVENOUS; SUBCUTANEOUS at 06:00

## 2018-12-19 RX ADMIN — FONDAPARINUX SODIUM 2.5 MILLIGRAM(S): 2.5 INJECTION, SOLUTION SUBCUTANEOUS at 13:11

## 2018-12-19 RX ADMIN — PIPERACILLIN AND TAZOBACTAM 25 GRAM(S): 4; .5 INJECTION, POWDER, LYOPHILIZED, FOR SOLUTION INTRAVENOUS at 05:16

## 2018-12-19 RX ADMIN — DEXMEDETOMIDINE HYDROCHLORIDE IN 0.9% SODIUM CHLORIDE 11.34 MICROGRAM(S)/KG/HR: 4 INJECTION INTRAVENOUS at 03:30

## 2018-12-19 RX ADMIN — Medication 20 MILLIGRAM(S): at 09:10

## 2018-12-19 RX ADMIN — CHLORHEXIDINE GLUCONATE 15 MILLILITER(S): 213 SOLUTION TOPICAL at 13:12

## 2018-12-19 RX ADMIN — Medication 200 GRAM(S): at 08:10

## 2018-12-19 RX ADMIN — PIPERACILLIN AND TAZOBACTAM 25 GRAM(S): 4; .5 INJECTION, POWDER, LYOPHILIZED, FOR SOLUTION INTRAVENOUS at 23:06

## 2018-12-19 RX ADMIN — PIPERACILLIN AND TAZOBACTAM 25 GRAM(S): 4; .5 INJECTION, POWDER, LYOPHILIZED, FOR SOLUTION INTRAVENOUS at 13:12

## 2018-12-19 RX ADMIN — CHLORHEXIDINE GLUCONATE 1 APPLICATION(S): 213 SOLUTION TOPICAL at 05:16

## 2018-12-19 RX ADMIN — HYDROMORPHONE HYDROCHLORIDE 0.5 MILLIGRAM(S): 2 INJECTION INTRAMUSCULAR; INTRAVENOUS; SUBCUTANEOUS at 05:34

## 2018-12-19 RX ADMIN — CHLORHEXIDINE GLUCONATE 15 MILLILITER(S): 213 SOLUTION TOPICAL at 05:15

## 2018-12-19 RX ADMIN — Medication 250 MILLIMOLE(S): at 04:46

## 2018-12-19 RX ADMIN — GABAPENTIN 300 MILLIGRAM(S): 400 CAPSULE ORAL at 05:16

## 2018-12-19 RX ADMIN — Medication 250 MILLIMOLE(S): at 05:45

## 2018-12-19 RX ADMIN — DEXMEDETOMIDINE HYDROCHLORIDE IN 0.9% SODIUM CHLORIDE 11.34 MICROGRAM(S)/KG/HR: 4 INJECTION INTRAVENOUS at 05:16

## 2018-12-19 RX ADMIN — Medication 300 MILLIGRAM(S): at 14:07

## 2018-12-19 NOTE — DIETITIAN INITIAL EVALUATION ADULT. - ENERGY NEEDS
Height: 5 feet 10 inches, Weight: 199 pounds  BMI: 28.7 kg/m2 IBW: 166 pounds (+/-10%), %IBW: 120%  Pertinent Info: Pt is a 72 yo male with PMH of remote appendectomy, CABG x4, HTN, DM, HLD, and CAD. Pt admitted with abdominal pain. Pt with acute respiratory failure and septic shock. Pt with acute cholecystitis s/p percutaneous cholecystostomy tube placement. Pt with OG tube with low continuos suction for gastric decompression. OG tube output noted 450 mL. As per flowsheet no edema, and no pressure injuries noted at this time.     The [place] State Equation (PSU) 2003b was used to calculate resting energy expenditure. Height: 5 feet 10 inches, Weight: 199 pounds  BMI: 28.7 kg/m2 IBW: 166 pounds (+/-10%), %IBW: 120%  Pertinent Info: Pt is a 74 yo male with PMH of remote appendectomy, CABG x4, HTN, DM, HLD, and CAD. Pt admitted with abdominal pain. Pt with acute respiratory failure and septic shock. Pt with acute cholecystitis s/p percutaneous cholecystostomy tube placement. Pt with OG tube with low continuos suction for gastric decompression. OG tube output noted 450 mL. As per flowsheet no edema, and no pressure injuries noted at this time.     The Marc State Equation (PSU) 2003b was used to calculate resting energy expenditure; 2030 kcal/day (22 kcal/kg) based on dosing weight 90.7 kg. Height: 5 feet 10 inches, Weight: 199 pounds  BMI: 28.7 kg/m2 IBW: 166 pounds (+/-10%), %IBW: 120%  Pertinent Info: Pt is a 72 yo male with PMH of remote appendectomy, CABG x4, HTN, DM, HLD, and CAD. Pt admitted with abdominal pain. Pt with acute respiratory failure and septic shock. Pt with acute cholecystitis s/p percutaneous cholecystostomy tube placement. As per flowsheet no edema, and no pressure injuries noted at this time.     The [place] State Equation (PSU) 2003b was used to calculate resting energy expenditure; 2030 kcal/day (22 kcal/kg) based on dosing weight 90.7 kg.

## 2018-12-19 NOTE — PROGRESS NOTE ADULT - SUBJECTIVE AND OBJECTIVE BOX
Interventional Radiology    S/P percutaneous cholecystostomy catheter placement, POD # 2.      Vital Signs Last 24 Hrs  T(C): 37.1 (19 Dec 2018 08:00), Max: 37.7 (18 Dec 2018 15:00)  T(F): 98.8 (19 Dec 2018 08:00), Max: 99.8 (18 Dec 2018 15:00)  HR: 61 (19 Dec 2018 08:15) (58 - 113)  BP: 107/58 (19 Dec 2018 07:52) (107/58 - 114/69)  BP(mean): 78 (19 Dec 2018 07:52) (69 - 87)  RR: 22 (19 Dec 2018 08:15) (6 - 30)  SpO2: 100% (19 Dec 2018 08:15) (86% - 100%)    General Appearance: Pt intubated, on dual pressors, on sedation     Procedure Site (Right Upper quadrant abdomen): Drainage catheter intact and flushed without difficulty.  Draining bilious fluid to leg bag     Output Cholecystostomy Drain: 12/19/18 7AM 80 mL, 12/18/18 7AM 60 mL      LABS:                        12.3   8.3   )-----------( 76       ( 19 Dec 2018 08:13 )             35.5     Culture - Body Fluid with Gram Stain (12.17.18 @ 23:39)    Gram Stain:   polymorphonuclear leukocytes seen per low power field  Gram Negative Rods per oil power field  by cytocentrifuge    Specimen Source: .Body Fluid Bile Fluid    Culture Results:   Numerous Citrobacter freundii complex      A/P   73y Male with H/O acute cholecystitis S/P percutaneous cholecystostomy     Continue care as per SICU team/primary team  Monitor outputs from percutaneous cholecystostomy catheter, flush once daily as ordered, maintain dressing around drainage catheter site.    CLAY Gonzales  [facility] [id number]  Ext [phone]

## 2018-12-19 NOTE — DIETITIAN INITIAL EVALUATION ADULT. - OTHER INFO
Pt seen by nutrition for length of stay. Pt is currently intubated and NPO x 3 days. Pt's daughter at bedside reports pt's usual body weight as 192 pounds. Pt's dosing weight noted as 199 pounds. Daughter denies pt with any nausea/vomiting and diarrhea/constipation. As per flow sheet, last bowel movement 12/18. Pt's daughter reports pt taking folic acid supplementation PTA. NKFA.

## 2018-12-19 NOTE — DIETITIAN INITIAL EVALUATION ADULT. - SIGNS/SYMPTOMS
pt s/p percutaneous cholecystostomy, sepsis, acute respiratory failure pt s/p IR percutaneous cholecystostomy 12/17/18, sepsis, acute respiratory failure

## 2018-12-19 NOTE — PHYSICAL THERAPY INITIAL EVALUATION ADULT - PERTINENT HX OF CURRENT PROBLEM, REHAB EVAL
Pt is 73M admitted 12/16/18 PMHx appendectomy, CABGx4(2016), HTN, DM, HLD; p/w RUQ abdominal pain & nausea.

## 2018-12-19 NOTE — DIETITIAN INITIAL EVALUATION ADULT. - ADHERENCE
n/a/Daughter reports pt did not follow any diet PTA. Limited dietary recall obtained, daughter cooks all meals for pt.

## 2018-12-19 NOTE — PHYSICAL THERAPY INITIAL EVALUATION ADULT - PRECAUTIONS/LIMITATIONS, REHAB EVAL
XRAY CHEST: clear lungs. CT ABDOMEN/PELVIS: Gallbladder wall edema, nonspecific. Differential includes acute cholecystitis. If there is a persistent concern for acute cholecystitis, consider nuclear medicine HIDA scan for more definitive evaluation.Adjacent bowel wall edema involving the hepatic flexure, possibly reactive to the gallbladder process.Small ascites in the right lower quadrant. XRAY CHEST: clear lungs. CT ABDOMEN/PELVIS: Gallbladder wall edema, nonspecific. Differential includes acute cholecystitis. If there is a persistent concern for acute cholecystitis, consider nuclear medicine HIDA scan for more definitive evaluation.Adjacent bowel wall edema involving the hepatic flexure, possibly reactive to the gallbladder process.Small ascites in the right lower quadrant./fall precautions

## 2018-12-19 NOTE — PHYSICAL THERAPY INITIAL EVALUATION ADULT - GENERAL OBSERVATIONS, REHAB EVAL
received semisupine in bed, following commands, willing to participate, dtr at bedside translating t/o session (pt speaks [place] citizen only), s/p percutaneous cholecystostomy tube placement, +supplemental 02, +vaso, +levo, +odom, +Lisa, +ICU monitoring.

## 2018-12-19 NOTE — AIRWAY REMOVAL NOTE  ADULT & PEDS - ARTIFICAL AIRWAY REMOVAL COMMENTS
Written order for extubation verified.  Patient identified by full name and birth date as per identification band.  Present at the procedure was Miri Elliott RN

## 2018-12-19 NOTE — DIETITIAN INITIAL EVALUATION ADULT. - NS AS NUTRI INTERV MEALS SNACK
Other (specify)/Pending extubation and tolerance of clear liquids, recommend consistent CHO, DASH diet.

## 2018-12-19 NOTE — PHYSICAL THERAPY INITIAL EVALUATION ADULT - ADDITIONAL COMMENTS
As per dtr, pt will be staying with her in a private home upon DC from hospital, about 4 steps to enter with handrail, 5 steps within with handrail. PTA independent with mobility and ADL's, ambulatory without AD, owns no DME, (+)driving, pt very active and watches grandchildren.

## 2018-12-19 NOTE — DIETITIAN INITIAL EVALUATION ADULT. - NS AS NUTRI INTERV ENTERAL NUTRITION
Pending extubation and tolerance of clear liquids, recommend Vital AF 1.2 at 75 cc x 24 hrs to provide 1800 ml fluid/day,  2160 kcal/day and 135 gm protein/day (this provides 24 kcal/kg and 1.5 gm protein/kg based on 90.7kg). If EN is warranted, recommend Vital AF 1.2 at 70 cc x 24 hrs to provide 1680 ml fluid/day,  2016 kcal/day and 117 gm protein/day (this provides 22 kcal/kg and 1.3 gm protein/kg based on 90.7kg).

## 2018-12-19 NOTE — PHYSICAL THERAPY INITIAL EVALUATION ADULT - PLANNED THERAPY INTERVENTIONS, PT EVAL
transfer training/bed mobility training/gait training/GOALS: Pt will negotiate 12 steps with unilateral handrail & step to pattern independently in 4wks

## 2018-12-19 NOTE — PROGRESS NOTE ADULT - SUBJECTIVE AND OBJECTIVE BOX
SUBJECTIVE: Pt seen and examined this morning. No acute overnight events. Pt remains intubated, sedated and on pressors.   Failed SBT yesterday. TTE yesterday with EF 18%, previously WNL.     Vital Signs Last 24 Hrs  T(C): 36.6 (19 Dec 2018 12:00), Max: 37.7 (18 Dec 2018 15:00)  T(F): 97.8 (19 Dec 2018 12:00), Max: 99.8 (18 Dec 2018 15:00)  HR: 68 (19 Dec 2018 12:15) (58 - 70)  BP: 107/58 (19 Dec 2018 07:52) (107/58 - 110/51)  BP(mean): 78 (19 Dec 2018 07:52) (69 - 78)  RR: 30 (19 Dec 2018 12:15) (6 - 34)  SpO2: 100% (19 Dec 2018 12:15) (95% - 100%)  I&O's Summary    18 Dec 2018 07:01  -  19 Dec 2018 07:00  --------------------------------------------------------  IN: 3417.7 mL / OUT: 1740 mL / NET: 1677.7 mL    19 Dec 2018 07:01  -  19 Dec 2018 12:55  --------------------------------------------------------  IN: 0 mL / OUT: 390 mL / NET: -390 mL      I&O's Detail    18 Dec 2018 07:01  -  19 Dec 2018 07:00  --------------------------------------------------------  IN:    dexmedetomidine Infusion: 607 mL    Enteral Tube Flush: 10 mL    lactated ringers.: 775 mL    multiple electrolytes Injection Type 1multiple electrolytes Injection Type 1: 300 mL    norepinephrine Infusion: 132.5 mL    Solution: 550 mL    Solution: 250 mL    Solution: 750 mL    vasopressin Infusion: 43.2 mL  Total IN: 3417.7 mL    OUT:    Drain: 80 mL    Indwelling Catheter - Urethral: 1210 mL    Nasoenteral Tube: 450 mL  Total OUT: 1740 mL    Total NET: 1677.7 mL      19 Dec 2018 07:01  -  19 Dec 2018 12:55  --------------------------------------------------------  IN:  Total IN: 0 mL    OUT:    Indwelling Catheter - Urethral: 390 mL  Total OUT: 390 mL    Total NET: -390 mL          Labs:                         12.3   8.3   )-----------( 76       ( 19 Dec 2018 08:13 )             35.5     12-19    131<L>  |  101  |  18  ----------------------------<  191<H>  4.0   |  18<L>  |  1.21    Ca    7.5<L>      19 Dec 2018 08:13  Phos  3.5     12-19  Mg     2.0     12-19    TPro  5.4<L>  /  Alb  2.5<L>  /  TBili  0.4  /  DBili  0.1  /  AST  126<H>  /  ALT  219<H>  /  AlkPhos  85  12-19    PT/INR - ( 19 Dec 2018 02:48 )   PT: 15.5 sec;   INR: 1.34 ratio         PTT - ( 19 Dec 2018 02:48 )  PTT:31.2 sec      Physical Exam:  General Appearance: NAD, intubated, sedated  Abdomen: Soft, non-tender, non-distented, RUQ drain with bilious drainage, no active bleeding/hematoma

## 2018-12-19 NOTE — PHYSICAL THERAPY INITIAL EVALUATION ADULT - DISCHARGE DISPOSITION, PT EVAL
home w/ home PT/home/home w/ assist/home with home PT for improved strength balance & endurance for activity; return to baseline & home safety assessent; recommend rolling walker for ambulation at this time, assist from family

## 2018-12-19 NOTE — PHYSICAL THERAPY INITIAL EVALUATION ADULT - REHAB POTENTIAL, PT EVAL
I will STOP taking the medications listed below when I get home from the hospital:  None
good, to achieve stated therapy goals

## 2018-12-19 NOTE — PHYSICAL THERAPY INITIAL EVALUATION ADULT - GAIT DEVIATIONS NOTED, PT EVAL
increased time in double stance/decreased velocity of limb motion/decreased step length/decreased weight-shifting ability/decreased claudio/decreased stride length

## 2018-12-19 NOTE — DIETITIAN INITIAL EVALUATION ADULT. - FACTORS AFF FOOD INTAKE
other (specify)/Pt currently intubated and has been NPO x 3 days other (specify)/Pt currently intubated and has been NPO x 3 days.  Pt with OG tube with low continuos suction for gastric decompression. OG tube output noted 450 mL.

## 2018-12-19 NOTE — PROGRESS NOTE ADULT - SUBJECTIVE AND OBJECTIVE BOX
HISTORY  73y Male Argentine speaking 73y Male with PMH of CABGx4 2016,  PAD s/pp LLE stent 2017 on ASA/Plavix, Pletal, remote appendectomy, HTN, DM, HLD who presents with abdominal pain since 2AM on 12/16/18. The pain is worst in RUQ. He endorses nausea but no vomiting. He is having chills. He had a similar episode 2 weeks ago and was worked up without avail. Last PO intake 12/16/18 at 10am, just a small piece of bread. He denies CP/SOB/palpitations. He took Plavix on the morning of 12/16. Pt was on 9 Primitivo treated with IV antibiotics for acute cholecystitis. On the morning of 12/17 pt became tachycardic to 130s with fevers and rigors and a decrease in BP (101 systolic from 130s). He was also altered. Pt developed diarrhea and C. diff sample sent as well. SICU consult called for closer monitoring. Plan for pt to go to CT abdomen/pelvis and then plan for IR for perc huy vs OR for cholecystectomy. Upon arrival to SICU, pt self D/Yovani IV and very restless and agitated. Pt also tachypneic, with O2 sats in low 90s. Decision was made to intubate patient to secure airway. Pt then became hypotensive and received 1.5 L LR fluid bolus. Left radial arterial line and right IJ central line placed, pt started on vasopressors. Pt's daughter, Tamra, initially at bedside and aware of pt's transfer to SICU. She was called and made aware of intubation, central line.      24 HOUR EVENTS: An SBT was attempted but patient became severely agitated and did not tolerate the trial. ASA restarted. TTE obtained demonstrating EF 18% changed from prior echo which demonstrated normal EF. Seroquil started during the evening. IV fluids changed to normo-sol @ 50ml/hr.     SUBJECTIVE/ROS:  [ ] A ten-point review of systems was otherwise negative except as noted.  [ ] Due to altered mental status/intubation, subjective information were not able to be obtained from the patient. History was obtained, to the extent possible, from review of the chart and collateral sources of information.      NEURO  RASS:  -1   Meds: aspirin Suppository 300 milliGRAM(s) Rectal daily  dexmedetomidine Infusion 0.5 MICROgram(s)/kG/Hr IV Continuous <Continuous>  gabapentin   Solution 300 milliGRAM(s) Oral two times a day  HYDROmorphone  Injectable 0.5 milliGRAM(s) IV Push every 3 hours PRN pain  QUEtiapine 25 milliGRAM(s) Oral every 12 hours    [x] Adequacy of sedation and pain control has been assessed and adjusted      RESPIRATORY  RR: 23 (12-19-18 @ 00:30) (0 - 33)  SpO2: 100% (12-19-18 @ 00:30) (86% - 100%)  Exam: unlabored, clear to auscultation bilaterally  Mechanical Ventilation: Mode: AC/ CMV (Assist Control/ Continuous Mandatory Ventilation), RR (machine): 16, RR (patient): 22, TV (machine): 550, FiO2: 40, PEEP: 5, ITime: 1, MAP: 11, PIP: 21  ABG - ( 18 Dec 2018 14:54 )  pH: x     /  pCO2: x     /  pO2: x     / HCO3: x     / Base Excess: x     /  SaO2: x       Lactate: 1.7    [N/A] Extubation Readiness Assessed  Meds: none      CARDIOVASCULAR  HR: 60 (12-19-18 @ 00:30) (60 - 113)  BP: 110/51 (12-18-18 @ 20:00) (110/51 - 114/69)  BP(mean): 69 (12-18-18 @ 20:00) (69 - 87)  ABP: 109/52 (12-19-18 @ 00:30) (100/55 - 149/65)  ABP(mean): 70 (12-19-18 @ 00:30) (66 - 100)  VBG - ( 18 Dec 2018 12:08 )  pH: 7.38  /  pCO2: 38    /  pO2: 27    / HCO3: 21    / Base Excess: -2.8  /  SaO2: 43     Lactate: x      Lactate, Blood: 1.7 mmol/L (12-18 @ 14:54)    Exam: regular rate and rhythm  Cardiac Rhythm: sinus  Perfusion     [x]Adequate   [ ]Inadequate  Mentation   [x]Normal       [ ]Reduced  Extremities  [x]Warm         [ ]Cool  Volume Status [ ]Hypervolemic [x]Euvolemic [ ]Hypovolemic  Meds: norepinephrine Infusion 0.3 MICROgram(s)/kG/Min IV Continuous <Continuous>        GI/NUTRITION  Exam: soft, nontender, nondistended, incision C/D/I  Diet: NPO  Meds: pantoprazole  Injectable 40 milliGRAM(s) IV Push daily      GENITOURINARY  I&O's Detail    12-17 @ 07:01 - 12-18 @ 07:00  --------------------------------------------------------  IN:    dexmedetomidine Infusion: 166.8 mL    Lactated Ringers IV Bolus: 1000 mL    lactated ringers.: 2625 mL    norepinephrine Infusion: 165.9 mL    norepinephrine Infusion: 697 mL    Solution: 300 mL    Solution: 100 mL    Solution: 250 mL    Solution: 150 mL    Solution: 250 mL    Solution: 250 mL    Solution: 400 mL    vasopressin Infusion: 34.2 mL  Total IN: 6388.9 mL    OUT:    Drain: 60 mL    Indwelling Catheter - Urethral: 1160 mL    Nasoenteral Tube: 1200 mL  Total OUT: 2420 mL    Total NET: 3968.9 mL      12-18 @ 07:01 - 12-19 @ 00:43  --------------------------------------------------------  IN:    dexmedetomidine Infusion: 417.3 mL    Enteral Tube Flush: 10 mL    lactated ringers.: 775 mL    multiple electrolytes Injection Type 1: 100 mL    norepinephrine Infusion: 108.7 mL    Solution: 250 mL    Solution: 150 mL    Solution: 450 mL    vasopressin Infusion: 30.6 mL  Total IN: 2291.6 mL    OUT:    Drain: 60 mL    Indwelling Catheter - Urethral: 845 mL    Nasoenteral Tube: 300 mL  Total OUT: 1205 mL    Total NET: 1086.6 mL          12-18    136  |  104  |  16  ----------------------------<  149<H>  4.3   |  17<L>  |  1.28    Ca    7.5<L>      18 Dec 2018 21:33  Phos  2.1     12-18  Mg     2.0     12-18    TPro  5.8<L>  /  Alb  2.7<L>  /  TBili  0.4  /  DBili  0.1  /  AST  203<H>  /  ALT  267<H>  /  AlkPhos  94  12-18    [x] Velasco catheter, indication: urine output monitoring in critically ill   Meds: multiple electrolytes Injection Type 1 1000 milliLiter(s) IV Continuous <Continuous>        HEMATOLOGIC  Meds: fondaparinux Injectable 2.5 milliGRAM(s) SubCutaneous daily    [x] VTE Prophylaxis                        13.1   9.5   )-----------( 76       ( 18 Dec 2018 21:33 )             38.1     PT/INR - ( 18 Dec 2018 14:54 )   PT: 17.1 sec;   INR: 1.47 ratio         PTT - ( 18 Dec 2018 14:54 )  PTT:33.0 sec  Transfusion     [ ] PRBC   [ ] Platelets   [ ] FFP   [ ] Cryoprecipitate      INFECTIOUS DISEASES  WBC Count: 9.5 K/uL (12-18 @ 21:33)  WBC Count: 11.5 K/uL (12-18 @ 14:54)  WBC Count: 15.5 K/uL (12-18 @ 09:48)  WBC Count: 20.9 K/uL (12-18 @ 02:49)    RECENT CULTURES:  Specimen Source: .Body Fluid Bile Fluid  Date/Time: 12-17 @ 23:39  Culture Results:   Numerous Citrobacter freundii complex  Gram Stain:   polymorphonuclear leukocytes seen per low power field  Gram Negative Rods per oil power field  by cytocentrifuge  Organism: --  Specimen Source: .Blood Blood  Date/Time: 12-17 @ 08:25  Culture Results:   No growth to date.  Gram Stain: --  Organism: --  Specimen Source: .Urine Clean Catch (Midstream)  Date/Time: 12-16 @ 18:08  Culture Results:   <10,000 CFU/ml  Normal Urogenital taylor present  Gram Stain: --  Organism: --  Specimen Source: .Blood Blood  Date/Time: 12-16 @ 18:01  Culture Results:   No growth to date.  Gram Stain: --  Organism: --    Meds: piperacillin/tazobactam IVPB. 3.375 Gram(s) IV Intermittent every 8 hours        ENDOCRINE  CAPILLARY BLOOD GLUCOSE      POCT Blood Glucose.: 110 mg/dL (18 Dec 2018 23:58)  POCT Blood Glucose.: 113 mg/dL (18 Dec 2018 18:23)  POCT Blood Glucose.: 115 mg/dL (18 Dec 2018 12:44)  POCT Blood Glucose.: 118 mg/dL (18 Dec 2018 05:40)    Meds: insulin lispro (HumaLOG) corrective regimen sliding scale   SubCutaneous every 6 hours  vasopressin Infusion 0.03 Unit(s)/Min IV Continuous <Continuous>        ACCESS DEVICES:  [x] Peripheral IV  [ ] Central Venous Line	[ ] R	[ ] L	[ ] IJ	[ ] Fem	[ ] SC	Placed:   [ ] Arterial Line		[ ] R	[ ] L	[ ] Fem	[ ] Rad	[ ] Ax	Placed:   [ ] PICC:					[ ] Mediport  [ ] Urinary Catheter, Date Placed:   [x] Necessity of urinary, arterial, and venous catheters discussed    OTHER MEDICATIONS:  chlorhexidine 0.12% Liquid 15 milliLiter(s) Oral Mucosa <User Schedule>  chlorhexidine 4% Liquid 1 Application(s) Topical <User Schedule>  investigational medication - general 50 milliGRAM(s) IV Push every 6 hours  investigational medication - IVPB 1500 milliGRAM(s) IV Intermittent every 6 hours      CODE STATUS: full code       IMAGING: < from: CT Abdomen and Pelvis w/ Oral Cont and w/ IV Cont (12.17.18 @ 12:55) >  IMPRESSION:     Gallbladder wall edema, nonspecific. Differential includes acute   cholecystitis. If there is a persistent concern for acute cholecystitis,   consider nuclear medicine HIDA scan for more definitive evaluation.    Adjacent bowel wall edema involving the hepatic flexure, possibly   reactive to the gallbladder process.    Small ascites in the right lower quadrant.        < end of copied text > HISTORY  73y Male Barbadian speaking 73y Male with PMH of CABGx4 2016,  PAD s/pp LLE stent 2017 on ASA/Plavix, Pletal, remote appendectomy, HTN, DM, HLD who presents with abdominal pain since 2AM on 12/16/18. The pain is worst in RUQ. He endorses nausea but no vomiting. He is having chills. He had a similar episode 2 weeks ago and was worked up without avail. Last PO intake 12/16/18 at 10am, just a small piece of bread. He denies CP/SOB/palpitations. He took Plavix on the morning of 12/16. Pt was on 9 Primitivo treated with IV antibiotics for acute cholecystitis. On the morning of 12/17 pt became tachycardic to 130s with fevers and rigors and a decrease in BP (101 systolic from 130s). He was also altered. Pt developed diarrhea and C. diff sample sent as well. SICU consult called for closer monitoring. Plan for pt to go to CT abdomen/pelvis and then plan for IR for perc huy vs OR for cholecystectomy. Upon arrival to SICU, pt self D/Yovani IV and very restless and agitated. Pt also tachypneic, with O2 sats in low 90s. Decision was made to intubate patient to secure airway. Pt then became hypotensive and received 1.5 L LR fluid bolus. Left radial arterial line and right IJ central line placed, pt started on vasopressors. Pt's daughter, Tamra, initially at bedside and aware of pt's transfer to SICU. She was called and made aware of intubation, central line.      24 HOUR EVENTS: An SBT was attempted but patient became severely agitated and did not tolerate the trial. ASA restarted. TTE obtained demonstrating EF 18% changed from prior echo which demonstrated normal EF. Seroquil started during the evening. IV fluids changed to normo-sol @ 50ml/hr.     SUBJECTIVE/ROS:  [ ] A ten-point review of systems was otherwise negative except as noted.  [ ] Due to altered mental status/intubation, subjective information were not able to be obtained from the patient. History was obtained, to the extent possible, from review of the chart and collateral sources of information.      NEURO  RASS:  -1   Meds: aspirin Suppository 300 milliGRAM(s) Rectal daily  dexmedetomidine Infusion 0.5 MICROgram(s)/kG/Hr IV Continuous <Continuous>  gabapentin   Solution 300 milliGRAM(s) Oral two times a day  HYDROmorphone  Injectable 0.5 milliGRAM(s) IV Push every 3 hours PRN pain  QUEtiapine 25 milliGRAM(s) Oral every 12 hours    [x] Adequacy of sedation and pain control has been assessed and adjusted      RESPIRATORY  RR: 23 (12-19-18 @ 00:30) (0 - 33)  SpO2: 100% (12-19-18 @ 00:30) (86% - 100%)  Exam: unlabored, clear to auscultation bilaterally  Mechanical Ventilation: Mode: AC/ CMV (Assist Control/ Continuous Mandatory Ventilation), RR (machine): 16, RR (patient): 22, TV (machine): 550, FiO2: 40, PEEP: 5, ITime: 1, MAP: 11, PIP: 21  ABG - ( 18 Dec 2018 14:54 )  pH: x     /  pCO2: x     /  pO2: x     / HCO3: x     / Base Excess: x     /  SaO2: x       Lactate: 1.7    [N/A] Extubation Readiness Assessed  Meds: none      CARDIOVASCULAR  HR: 60 (12-19-18 @ 00:30) (60 - 113)  BP: 110/51 (12-18-18 @ 20:00) (110/51 - 114/69)  BP(mean): 69 (12-18-18 @ 20:00) (69 - 87)  ABP: 109/52 (12-19-18 @ 00:30) (100/55 - 149/65)  ABP(mean): 70 (12-19-18 @ 00:30) (66 - 100)  VBG - ( 18 Dec 2018 12:08 )  pH: 7.38  /  pCO2: 38    /  pO2: 27    / HCO3: 21    / Base Excess: -2.8  /  SaO2: 43     Lactate: x      Lactate, Blood: 1.7 mmol/L (12-18 @ 14:54)    Exam: regular rate and rhythm  Cardiac Rhythm: sinus  Perfusion     [x]Adequate   [ ]Inadequate  Mentation   [x]Normal       [ ]Reduced  Extremities  [x]Warm         [ ]Cool  Volume Status [ ]Hypervolemic [x]Euvolemic [ ]Hypovolemic  Meds: norepinephrine Infusion 0.3 MICROgram(s)/kG/Min IV Continuous <Continuous>        GI/NUTRITION  Exam: soft, nontender, nondistended, incision C/D/I  Diet: NPO  Meds: pantoprazole  Injectable 40 milliGRAM(s) IV Push daily      GENITOURINARY  I&O's Detail    12-17 @ 07:01 - 12-18 @ 07:00  --------------------------------------------------------  IN:    dexmedetomidine Infusion: 166.8 mL    Lactated Ringers IV Bolus: 1000 mL    lactated ringers.: 2625 mL    norepinephrine Infusion: 165.9 mL    norepinephrine Infusion: 697 mL    Solution: 300 mL    Solution: 100 mL    Solution: 250 mL    Solution: 150 mL    Solution: 250 mL    Solution: 250 mL    Solution: 400 mL    vasopressin Infusion: 34.2 mL  Total IN: 6388.9 mL    OUT:    Drain: 60 mL    Indwelling Catheter - Urethral: 1160 mL    Nasoenteral Tube: 1200 mL  Total OUT: 2420 mL    Total NET: 3968.9 mL      12-18 @ 07:01 - 12-19 @ 00:43  --------------------------------------------------------  IN:    dexmedetomidine Infusion: 417.3 mL    Enteral Tube Flush: 10 mL    lactated ringers.: 775 mL    multiple electrolytes Injection Type 1: 100 mL    norepinephrine Infusion: 108.7 mL    Solution: 250 mL    Solution: 150 mL    Solution: 450 mL    vasopressin Infusion: 30.6 mL  Total IN: 2291.6 mL    OUT:    Drain: 60 mL    Indwelling Catheter - Urethral: 845 mL    Nasoenteral Tube: 300 mL  Total OUT: 1205 mL    Total NET: 1086.6 mL                      12.8                 135  | 18   | 18           8.7   >-----------< 74      ------------------------< 149                   36.5                 4.3  | 103  | 1.25                                         Ca 7.5   Mg 2.0   Ph 1.9        [x] Velasco catheter, indication: urine output monitoring in critically ill   Meds: multiple electrolytes Injection Type 1 1000 milliLiter(s) IV Continuous <Continuous>        HEMATOLOGIC  Meds: fondaparinux Injectable 2.5 milliGRAM(s) SubCutaneous daily    [x] VTE Prophylaxis             PT/INR -  15.5 sec / 1.34 ratio   ( 19 Dec 2018 02:48 )       PTT -  31.2 sec   ( 19 Dec 2018 02:48 )  Transfusion     [ ] PRBC   [ ] Platelets   [ ] FFP   [ ] Cryoprecipitate      INFECTIOUS DISEASES  WBC Count: 9.5 K/uL (12-18 @ 21:33)  WBC Count: 11.5 K/uL (12-18 @ 14:54)  WBC Count: 15.5 K/uL (12-18 @ 09:48)  WBC Count: 20.9 K/uL (12-18 @ 02:49)    RECENT CULTURES:  Specimen Source: .Body Fluid Bile Fluid  Date/Time: 12-17 @ 23:39  Culture Results:   Numerous Citrobacter freundii complex  Gram Stain:   polymorphonuclear leukocytes seen per low power field  Gram Negative Rods per oil power field  by cytocentrifuge  Organism: --  Specimen Source: .Blood Blood  Date/Time: 12-17 @ 08:25  Culture Results:   No growth to date.  Gram Stain: --  Organism: --  Specimen Source: .Urine Clean Catch (Midstream)  Date/Time: 12-16 @ 18:08  Culture Results:   <10,000 CFU/ml  Normal Urogenital taylor present  Gram Stain: --  Organism: --  Specimen Source: .Blood Blood  Date/Time: 12-16 @ 18:01  Culture Results:   No growth to date.  Gram Stain: --  Organism: --    Meds: piperacillin/tazobactam IVPB. 3.375 Gram(s) IV Intermittent every 8 hours        ENDOCRINE  CAPILLARY BLOOD GLUCOSE      POCT Blood Glucose.: 110 mg/dL (18 Dec 2018 23:58)  POCT Blood Glucose.: 113 mg/dL (18 Dec 2018 18:23)  POCT Blood Glucose.: 115 mg/dL (18 Dec 2018 12:44)  POCT Blood Glucose.: 118 mg/dL (18 Dec 2018 05:40)    Meds: insulin lispro (HumaLOG) corrective regimen sliding scale   SubCutaneous every 6 hours  vasopressin Infusion 0.03 Unit(s)/Min IV Continuous <Continuous>        ACCESS DEVICES:  [x] Peripheral IV  [ ] Central Venous Line	[ ] R	[ ] L	[ ] IJ	[ ] Fem	[ ] SC	Placed:   [ ] Arterial Line		[ ] R	[ ] L	[ ] Fem	[ ] Rad	[ ] Ax	Placed:   [ ] PICC:					[ ] Mediport  [ ] Urinary Catheter, Date Placed:   [x] Necessity of urinary, arterial, and venous catheters discussed    OTHER MEDICATIONS:  chlorhexidine 0.12% Liquid 15 milliLiter(s) Oral Mucosa <User Schedule>  chlorhexidine 4% Liquid 1 Application(s) Topical <User Schedule>  investigational medication - general 50 milliGRAM(s) IV Push every 6 hours  investigational medication - IVPB 1500 milliGRAM(s) IV Intermittent every 6 hours      CODE STATUS: full code       IMAGING: < from: CT Abdomen and Pelvis w/ Oral Cont and w/ IV Cont (12.17.18 @ 12:55) >  IMPRESSION:     Gallbladder wall edema, nonspecific. Differential includes acute   cholecystitis. If there is a persistent concern for acute cholecystitis,   consider nuclear medicine HIDA scan for more definitive evaluation.    Adjacent bowel wall edema involving the hepatic flexure, possibly   reactive to the gallbladder process.    Small ascites in the right lower quadrant.        < end of copied text >

## 2018-12-20 LAB
ALBUMIN SERPL ELPH-MCNC: 2.5 G/DL — LOW (ref 3.3–5)
ALBUMIN SERPL ELPH-MCNC: 3 G/DL — LOW (ref 3.3–5)
ALP SERPL-CCNC: 87 U/L — SIGNIFICANT CHANGE UP (ref 40–120)
ALP SERPL-CCNC: 99 U/L — SIGNIFICANT CHANGE UP (ref 40–120)
ALT FLD-CCNC: 164 U/L — HIGH (ref 10–45)
ALT FLD-CCNC: 171 U/L — HIGH (ref 10–45)
ANION GAP SERPL CALC-SCNC: 14 MMOL/L — SIGNIFICANT CHANGE UP (ref 5–17)
ANION GAP SERPL CALC-SCNC: 14 MMOL/L — SIGNIFICANT CHANGE UP (ref 5–17)
AST SERPL-CCNC: 76 U/L — HIGH (ref 10–40)
AST SERPL-CCNC: 83 U/L — HIGH (ref 10–40)
BASE EXCESS BLDV CALC-SCNC: 0.7 MMOL/L — SIGNIFICANT CHANGE UP (ref -2–2)
BILIRUB SERPL-MCNC: 0.4 MG/DL — SIGNIFICANT CHANGE UP (ref 0.2–1.2)
BILIRUB SERPL-MCNC: 0.6 MG/DL — SIGNIFICANT CHANGE UP (ref 0.2–1.2)
BUN SERPL-MCNC: 18 MG/DL — SIGNIFICANT CHANGE UP (ref 7–23)
BUN SERPL-MCNC: 21 MG/DL — SIGNIFICANT CHANGE UP (ref 7–23)
CA-I BLD-SCNC: 1.02 MMOL/L — LOW (ref 1.12–1.3)
CALCIUM SERPL-MCNC: 7.7 MG/DL — LOW (ref 8.4–10.5)
CALCIUM SERPL-MCNC: 8.4 MG/DL — SIGNIFICANT CHANGE UP (ref 8.4–10.5)
CHLORIDE SERPL-SCNC: 100 MMOL/L — SIGNIFICANT CHANGE UP (ref 96–108)
CHLORIDE SERPL-SCNC: 101 MMOL/L — SIGNIFICANT CHANGE UP (ref 96–108)
CO2 BLDV-SCNC: 25 MMOL/L — SIGNIFICANT CHANGE UP (ref 22–30)
CO2 SERPL-SCNC: 19 MMOL/L — LOW (ref 22–31)
CO2 SERPL-SCNC: 22 MMOL/L — SIGNIFICANT CHANGE UP (ref 22–31)
CREAT SERPL-MCNC: 1.11 MG/DL — SIGNIFICANT CHANGE UP (ref 0.5–1.3)
CREAT SERPL-MCNC: 1.17 MG/DL — SIGNIFICANT CHANGE UP (ref 0.5–1.3)
GAS PNL BLDA: SIGNIFICANT CHANGE UP
GAS PNL BLDV: SIGNIFICANT CHANGE UP
GLUCOSE BLDC GLUCOMTR-MCNC: 108 MG/DL — HIGH (ref 70–99)
GLUCOSE BLDC GLUCOMTR-MCNC: 111 MG/DL — HIGH (ref 70–99)
GLUCOSE BLDC GLUCOMTR-MCNC: 114 MG/DL — HIGH (ref 70–99)
GLUCOSE BLDC GLUCOMTR-MCNC: 80 MG/DL — SIGNIFICANT CHANGE UP (ref 70–99)
GLUCOSE BLDC GLUCOMTR-MCNC: 86 MG/DL — SIGNIFICANT CHANGE UP (ref 70–99)
GLUCOSE SERPL-MCNC: 115 MG/DL — HIGH (ref 70–99)
GLUCOSE SERPL-MCNC: 134 MG/DL — HIGH (ref 70–99)
HCO3 BLDV-SCNC: 24 MMOL/L — SIGNIFICANT CHANGE UP (ref 21–29)
HCT VFR BLD CALC: 36.1 % — LOW (ref 39–50)
HCT VFR BLD CALC: 37.2 % — LOW (ref 39–50)
HGB BLD-MCNC: 11.5 G/DL — LOW (ref 13–17)
HGB BLD-MCNC: 12.9 G/DL — LOW (ref 13–17)
HOROWITZ INDEX BLDV+IHG-RTO: 28 — SIGNIFICANT CHANGE UP
LACTATE SERPL-SCNC: 1.8 MMOL/L — SIGNIFICANT CHANGE UP (ref 0.7–2)
MAGNESIUM SERPL-MCNC: 1.8 MG/DL — SIGNIFICANT CHANGE UP (ref 1.6–2.6)
MCHC RBC-ENTMCNC: 27.3 PG — SIGNIFICANT CHANGE UP (ref 27–34)
MCHC RBC-ENTMCNC: 29.5 PG — SIGNIFICANT CHANGE UP (ref 27–34)
MCHC RBC-ENTMCNC: 32 GM/DL — SIGNIFICANT CHANGE UP (ref 32–36)
MCHC RBC-ENTMCNC: 34.6 GM/DL — SIGNIFICANT CHANGE UP (ref 32–36)
MCV RBC AUTO: 85.4 FL — SIGNIFICANT CHANGE UP (ref 80–100)
MCV RBC AUTO: 85.5 FL — SIGNIFICANT CHANGE UP (ref 80–100)
PCO2 BLDV: 35 MMHG — SIGNIFICANT CHANGE UP (ref 35–50)
PH BLDV: 7.45 — SIGNIFICANT CHANGE UP (ref 7.35–7.45)
PHOSPHATE SERPL-MCNC: 2.2 MG/DL — LOW (ref 2.5–4.5)
PLATELET # BLD AUTO: 80 K/UL — LOW (ref 150–400)
PLATELET # BLD AUTO: 84 K/UL — LOW (ref 150–400)
PO2 BLDV: 31 MMHG — SIGNIFICANT CHANGE UP (ref 25–45)
POTASSIUM SERPL-MCNC: 3.1 MMOL/L — LOW (ref 3.5–5.3)
POTASSIUM SERPL-MCNC: 3.5 MMOL/L — SIGNIFICANT CHANGE UP (ref 3.5–5.3)
POTASSIUM SERPL-SCNC: 3.1 MMOL/L — LOW (ref 3.5–5.3)
POTASSIUM SERPL-SCNC: 3.5 MMOL/L — SIGNIFICANT CHANGE UP (ref 3.5–5.3)
PROT SERPL-MCNC: 5.8 G/DL — LOW (ref 6–8.3)
PROT SERPL-MCNC: 6.7 G/DL — SIGNIFICANT CHANGE UP (ref 6–8.3)
RBC # BLD: 4.22 M/UL — SIGNIFICANT CHANGE UP (ref 4.2–5.8)
RBC # BLD: 4.36 M/UL — SIGNIFICANT CHANGE UP (ref 4.2–5.8)
RBC # FLD: 12.8 % — SIGNIFICANT CHANGE UP (ref 10.3–14.5)
RBC # FLD: 12.8 % — SIGNIFICANT CHANGE UP (ref 10.3–14.5)
SAO2 % BLDV: 58 % — LOW (ref 67–88)
SODIUM SERPL-SCNC: 134 MMOL/L — LOW (ref 135–145)
SODIUM SERPL-SCNC: 136 MMOL/L — SIGNIFICANT CHANGE UP (ref 135–145)
SRA INTERP SER-IMP: SIGNIFICANT CHANGE UP
WBC # BLD: 8.1 K/UL — SIGNIFICANT CHANGE UP (ref 3.8–10.5)
WBC # BLD: 8.5 K/UL — SIGNIFICANT CHANGE UP (ref 3.8–10.5)
WBC # FLD AUTO: 8.1 K/UL — SIGNIFICANT CHANGE UP (ref 3.8–10.5)
WBC # FLD AUTO: 8.5 K/UL — SIGNIFICANT CHANGE UP (ref 3.8–10.5)

## 2018-12-20 PROCEDURE — 99291 CRITICAL CARE FIRST HOUR: CPT

## 2018-12-20 PROCEDURE — 99231 SBSQ HOSP IP/OBS SF/LOW 25: CPT

## 2018-12-20 PROCEDURE — 71045 X-RAY EXAM CHEST 1 VIEW: CPT | Mod: 26

## 2018-12-20 RX ORDER — PIPERACILLIN AND TAZOBACTAM 4; .5 G/20ML; G/20ML
3.38 INJECTION, POWDER, LYOPHILIZED, FOR SOLUTION INTRAVENOUS EVERY 8 HOURS
Qty: 0 | Refills: 0 | Status: DISCONTINUED | OUTPATIENT
Start: 2018-12-20 | End: 2018-12-21

## 2018-12-20 RX ORDER — INSULIN LISPRO 100/ML
VIAL (ML) SUBCUTANEOUS AT BEDTIME
Qty: 0 | Refills: 0 | Status: DISCONTINUED | OUTPATIENT
Start: 2018-12-20 | End: 2018-12-23

## 2018-12-20 RX ORDER — MAGNESIUM SULFATE 500 MG/ML
2 VIAL (ML) INJECTION ONCE
Qty: 0 | Refills: 0 | Status: COMPLETED | OUTPATIENT
Start: 2018-12-20 | End: 2018-12-20

## 2018-12-20 RX ORDER — ENOXAPARIN SODIUM 100 MG/ML
40 INJECTION SUBCUTANEOUS DAILY
Qty: 0 | Refills: 0 | Status: DISCONTINUED | OUTPATIENT
Start: 2018-12-21 | End: 2018-12-23

## 2018-12-20 RX ORDER — POTASSIUM CHLORIDE 20 MEQ
20 PACKET (EA) ORAL
Qty: 0 | Refills: 0 | Status: COMPLETED | OUTPATIENT
Start: 2018-12-20 | End: 2018-12-20

## 2018-12-20 RX ORDER — FUROSEMIDE 40 MG
20 TABLET ORAL EVERY 8 HOURS
Qty: 0 | Refills: 0 | Status: COMPLETED | OUTPATIENT
Start: 2018-12-20 | End: 2018-12-20

## 2018-12-20 RX ORDER — CEFTRIAXONE 500 MG/1
2 INJECTION, POWDER, FOR SOLUTION INTRAMUSCULAR; INTRAVENOUS EVERY 24 HOURS
Qty: 0 | Refills: 0 | Status: DISCONTINUED | OUTPATIENT
Start: 2018-12-20 | End: 2018-12-20

## 2018-12-20 RX ORDER — POTASSIUM PHOSPHATE, MONOBASIC POTASSIUM PHOSPHATE, DIBASIC 236; 224 MG/ML; MG/ML
15 INJECTION, SOLUTION INTRAVENOUS ONCE
Qty: 0 | Refills: 0 | Status: COMPLETED | OUTPATIENT
Start: 2018-12-20 | End: 2018-12-20

## 2018-12-20 RX ORDER — CALCIUM GLUCONATE 100 MG/ML
2 VIAL (ML) INTRAVENOUS ONCE
Qty: 0 | Refills: 0 | Status: COMPLETED | OUTPATIENT
Start: 2018-12-20 | End: 2018-12-20

## 2018-12-20 RX ORDER — INSULIN LISPRO 100/ML
VIAL (ML) SUBCUTANEOUS
Qty: 0 | Refills: 0 | Status: DISCONTINUED | OUTPATIENT
Start: 2018-12-20 | End: 2018-12-23

## 2018-12-20 RX ORDER — ASPIRIN/CALCIUM CARB/MAGNESIUM 324 MG
81 TABLET ORAL DAILY
Qty: 0 | Refills: 0 | Status: DISCONTINUED | OUTPATIENT
Start: 2018-12-20 | End: 2018-12-23

## 2018-12-20 RX ADMIN — Medication 50 MILLIEQUIVALENT(S): at 18:49

## 2018-12-20 RX ADMIN — Medication 50 GRAM(S): at 06:40

## 2018-12-20 RX ADMIN — FONDAPARINUX SODIUM 2.5 MILLIGRAM(S): 2.5 INJECTION, SOLUTION SUBCUTANEOUS at 12:22

## 2018-12-20 RX ADMIN — Medication 200 GRAM(S): at 08:20

## 2018-12-20 RX ADMIN — POTASSIUM PHOSPHATE, MONOBASIC POTASSIUM PHOSPHATE, DIBASIC 62.5 MILLIMOLE(S): 236; 224 INJECTION, SOLUTION INTRAVENOUS at 06:40

## 2018-12-20 RX ADMIN — Medication 81 MILLIGRAM(S): at 12:53

## 2018-12-20 RX ADMIN — CHLORHEXIDINE GLUCONATE 1 APPLICATION(S): 213 SOLUTION TOPICAL at 06:41

## 2018-12-20 RX ADMIN — QUETIAPINE FUMARATE 25 MILLIGRAM(S): 200 TABLET, FILM COATED ORAL at 09:10

## 2018-12-20 RX ADMIN — Medication 50 MILLIEQUIVALENT(S): at 20:24

## 2018-12-20 RX ADMIN — PIPERACILLIN AND TAZOBACTAM 25 GRAM(S): 4; .5 INJECTION, POWDER, LYOPHILIZED, FOR SOLUTION INTRAVENOUS at 21:29

## 2018-12-20 RX ADMIN — Medication 50 MILLIEQUIVALENT(S): at 21:30

## 2018-12-20 RX ADMIN — PANTOPRAZOLE SODIUM 40 MILLIGRAM(S): 20 TABLET, DELAYED RELEASE ORAL at 12:00

## 2018-12-20 RX ADMIN — Medication 20 MILLIGRAM(S): at 14:52

## 2018-12-20 RX ADMIN — PIPERACILLIN AND TAZOBACTAM 25 GRAM(S): 4; .5 INJECTION, POWDER, LYOPHILIZED, FOR SOLUTION INTRAVENOUS at 14:52

## 2018-12-20 RX ADMIN — QUETIAPINE FUMARATE 25 MILLIGRAM(S): 200 TABLET, FILM COATED ORAL at 21:29

## 2018-12-20 RX ADMIN — Medication 20 MILLIGRAM(S): at 21:29

## 2018-12-20 RX ADMIN — CEFTRIAXONE 100 GRAM(S): 500 INJECTION, POWDER, FOR SOLUTION INTRAMUSCULAR; INTRAVENOUS at 00:40

## 2018-12-20 NOTE — PROGRESS NOTE ADULT - SUBJECTIVE AND OBJECTIVE BOX
HISTORY    Male with PMH of CABGx4 2016,  PAD s/pp LLE stent 2017 on ASA/Plavix, Pletal, remote appendectomy, HTN, DM, HLD who presents with abdominal pain since 2AM on 12/16/18. The pain is worst in RUQ. He endorses nausea but no vomiting. He is having chills. He had a similar episode 2 weeks ago and was worked up without avail. Last PO intake 12/16/18 at 10am, just a small piece of bread. He denies CP/SOB/palpitations. He took Plavix on the morning of 12/16. Pt was on 9 Primitivo treated with IV antibiotics for acute cholecystitis. On the morning of 12/17 pt became tachycardic to 130s with fevers and rigors and a decrease in BP (101 systolic from 130s). He was also altered. Pt developed diarrhea and C. diff sample sent as well. SICU consult called for closer monitoring. Plan for pt to go to CT abdomen/pelvis and then plan for IR for perc huy vs OR for cholecystectomy. Upon arrival to SICU, pt self D/Yovani IV and very restless and agitated. Pt also tachypneic, with O2 sats in low 90s. Decision was made to intubate patient to secure airway. Pt then became hypotensive and received 1.5 L LR fluid bolus. Left radial arterial line and right IJ central line placed, pt started on vasopressors. Pt's daughter, Tamra, initially at bedside and aware of pt's transfer to SICU. She was called and made aware of intubation, central line.      24 HOUR EVENTS: Diuresed with 20mg. of IV lasix, then successfully extubated in the am. IV locked. Patient remains on low dose Levophed at 0.02 with vasopressin. Passed bedside speech and swallow.     SUBJECTIVE/ROS:  [ ] A ten-point review of systems was otherwise negative except as noted.  [ ] Due to altered mental status/intubation, subjective information were not able to be obtained from the patient. History was obtained, to the extent possible, from review of the chart and collateral sources of information.      NEURO  Exam: awake, alert, oriented  Meds: aspirin Suppository 300 milliGRAM(s) Rectal daily  HYDROmorphone  Injectable 0.5 milliGRAM(s) IV Push every 3 hours PRN pain  QUEtiapine 25 milliGRAM(s) Oral every 12 hours  [x] Adequacy of sedation and pain control has been assessed and adjusted      RESPIRATORY  RR: 32 (12-20-18 @ 02:30) (15 - 42)  SpO2: 96% (12-20-18 @ 02:30) (96% - 100%)  Exam: unlabored, clear to auscultation bilaterally  Mechanical Ventilation: Mode: CPAP with PS, RR (patient): 30, FiO2: 30, PEEP: 5, PS: 5, MAP: 8  ABG - ( 19 Dec 2018 10:33 )  pH: 7.48  /  pCO2: 26    /  pO2: 94    / HCO3: 19    / Base Excess: -2.9  /  SaO2: 98      Lactate: x      [N/A] Extubation Readiness Assessed  Meds: none      CARDIOVASCULAR  HR: 68 (12-20-18 @ 02:30) (58 - 71)  BP: 97/55 (12-19-18 @ 20:15) (97/55 - 107/58)  BP(mean): 71 (12-19-18 @ 20:15) (71 - 78)  ABP: 104/52 (12-20-18 @ 02:30) (85/42 - 130/63)  ABP(mean): 69 (12-20-18 @ 02:30) (57 - 85)  VBG - ( 19 Dec 2018 02:44 )  pH: 7.37  /  pCO2: 40    /  pO2: 35    / HCO3: 23    / Base Excess: -1.9  /  SaO2: 59     Lactate: x      Exam: regular rate and rhythm  Cardiac Rhythm: sinus  Perfusion     [x]Adequate   [ ]Inadequate  Mentation   [x]Normal       [ ]Reduced  Extremities  [x]Warm         [ ]Cool  Volume Status [ ]Hypervolemic [x]Euvolemic [ ]Hypovolemic  Meds: norepinephrine Infusion 0.3 MICROgram(s)/kG/Min IV Continuous <Continuous>        GI/NUTRITION  Exam: soft, nontender, nondistended, incision C/D/I  Diet: NPO   Meds: pantoprazole  Injectable 40 milliGRAM(s) IV Push daily      GENITOURINARY  I&O's Detail    12-18 @ 07:01  -  12-19 @ 07:00  --------------------------------------------------------  IN:    dexmedetomidine Infusion: 607 mL    Enteral Tube Flush: 10 mL    lactated ringers.: 775 mL    multiple electrolytes Injection Type 1multiple electrolytes Injection Type 1: 300 mL    norepinephrine Infusion: 132.5 mL    Solution: 550 mL    Solution: 250 mL    Solution: 750 mL    vasopressin Infusion: 43.2 mL  Total IN: 3417.7 mL    OUT:    Drain: 80 mL    Indwelling Catheter - Urethral: 1210 mL    Nasoenteral Tube: 450 mL  Total OUT: 1740 mL    Total NET: 1677.7 mL      12-19 @ 07:01  -  12-20 @ 02:45  --------------------------------------------------------  IN:    multiple electrolytes Injection Type 1multiple electrolytes Injection Type 1: 100 mL    norepinephrine Infusion: 26.4 mL    Solution: 100 mL    Solution: 100 mL    vasopressin Infusion: 34.2 mL  Total IN: 360.6 mL    OUT:    Indwelling Catheter - Urethral: 1050 mL    Nasoenteral Tube: 100 mL  Total OUT: 1150 mL    Total NET: -789.4 mL          12-19    131<L>  |  101  |  18  ----------------------------<  191<H>  4.0   |  18<L>  |  1.21    Ca    7.5<L>      19 Dec 2018 08:13  Phos  3.5     12-19  Mg     2.0     12-19    TPro  5.4<L>  /  Alb  2.5<L>  /  TBili  0.4  /  DBili  0.1  /  AST  126<H>  /  ALT  219<H>  /  AlkPhos  85  12-19    [x] Velasco catheter, indication: urine output monitoring in critically ill   Meds: none      HEMATOLOGIC  Meds: fondaparinux Injectable 2.5 milliGRAM(s) SubCutaneous daily    [x] VTE Prophylaxis                        12.3   8.3   )-----------( 76       ( 19 Dec 2018 08:13 )             35.5     PT/INR - ( 19 Dec 2018 02:48 )   PT: 15.5 sec;   INR: 1.34 ratio         PTT - ( 19 Dec 2018 02:48 )  PTT:31.2 sec  Transfusion     [ ] PRBC   [ ] Platelets   [ ] FFP   [ ] Cryoprecipitate      INFECTIOUS DISEASES  WBC Count: 8.3 K/uL (12-19 @ 08:13)  WBC Count: 8.7 K/uL (12-19 @ 02:48)    RECENT CULTURES:  Specimen Source: .Body Fluid Bile Fluid  Date/Time: 12-17 @ 23:39  Culture Results:   Numerous Citrobacter freundii complex  Gram Stain:   polymorphonuclear leukocytes seen per low power field  Gram Negative Rods per oil power field  by cytocentrifuge  Organism: Citrobacter freundii  Specimen Source: .Blood Blood  Date/Time: 12-17 @ 08:25  Culture Results:   No growth to date.  Gram Stain: --  Organism: --  Specimen Source: .Urine Clean Catch (Midstream)  Date/Time: 12-16 @ 18:08  Culture Results:   <10,000 CFU/ml  Normal Urogenital taylor present  Gram Stain: --  Organism: --  Specimen Source: .Blood Blood  Date/Time: 12-16 @ 18:01  Culture Results:   No growth to date.  Gram Stain: --  Organism: --    Meds: cefTRIAXone   IVPB 2 Gram(s) IV Intermittent every 24 hours        ENDOCRINE  CAPILLARY BLOOD GLUCOSE      POCT Blood Glucose.: 108 mg/dL (19 Dec 2018 23:53)  POCT Blood Glucose.: 114 mg/dL (19 Dec 2018 17:19)  POCT Blood Glucose.: 132 mg/dL (19 Dec 2018 13:17)  POCT Blood Glucose.: 108 mg/dL (19 Dec 2018 06:12)    Meds: insulin lispro (HumaLOG) corrective regimen sliding scale   SubCutaneous every 6 hours  vasopressin Infusion 0.03 Unit(s)/Min IV Continuous <Continuous>        ACCESS DEVICES:  [x] Peripheral IV  [ ] Central Venous Line	[ ] R	[ ] L	[ ] IJ	[ ] Fem	[ ] SC	Placed:   [ ] Arterial Line		[ ] R	[ ] L	[ ] Fem	[ ] Rad	[ ] Ax	Placed:   [ ] PICC:					[ ] Mediport  [ ] Urinary Catheter, Date Placed:   [x] Necessity of urinary, arterial, and venous catheters discussed    OTHER MEDICATIONS:  chlorhexidine 0.12% Liquid 15 milliLiter(s) Oral Mucosa <User Schedule>  chlorhexidine 4% Liquid 1 Application(s) Topical <User Schedule>  investigational medication - general 50 milliGRAM(s) IV Push every 6 hours  investigational medication - IVPB 1500 milliGRAM(s) IV Intermittent every 6 hours      CODE STATUS: full code       IMAGING: < from: CT Abdomen and Pelvis w/ Oral Cont and w/ IV Cont (12.17.18 @ 12:55) >  IMPRESSION:     Gallbladder wall edema, nonspecific. Differential includes acute   cholecystitis. If there is a persistent concern for acute cholecystitis,   consider nuclear medicine HIDA scan for more definitive evaluation.    Adjacent bowel wall edema involving the hepatic flexure, possibly   reactive to the gallbladder process.    Small ascites in the right lower quadrant.        < end of copied text > HISTORY    Male with PMH of CABGx4 2016,  PAD s/pp LLE stent 2017 on ASA/Plavix, Pletal, remote appendectomy, HTN, DM, HLD who presents with abdominal pain since 2AM on 12/16/18. The pain is worst in RUQ. He endorses nausea but no vomiting. He is having chills. He had a similar episode 2 weeks ago and was worked up without avail. Last PO intake 12/16/18 at 10am, just a small piece of bread. He denies CP/SOB/palpitations. He took Plavix on the morning of 12/16. Pt was on 9 Primitivo treated with IV antibiotics for acute cholecystitis. On the morning of 12/17 pt became tachycardic to 130s with fevers and rigors and a decrease in BP (101 systolic from 130s). He was also altered. Pt developed diarrhea and C. diff sample sent as well. SICU consult called for closer monitoring. Plan for pt to go to CT abdomen/pelvis and then plan for IR for perc huy vs OR for cholecystectomy. Upon arrival to SICU, pt self D/Yovani IV and very restless and agitated. Pt also tachypneic, with O2 sats in low 90s. Decision was made to intubate patient to secure airway. Pt then became hypotensive and received 1.5 L LR fluid bolus. Left radial arterial line and right IJ central line placed, pt started on vasopressors. Pt's daughter, Tamra, initially at bedside and aware of pt's transfer to SICU. She was called and made aware of intubation, central line.      24 HOUR EVENTS: Diuresed with 20mg. of IV lasix, then successfully extubated in the am. IV locked. Patient remains on low dose Levophed at 0.02 with vasopressin. Passed bedside speech and swallow.     SUBJECTIVE/ROS:  [ ] A ten-point review of systems was otherwise negative except as noted.  [ ] Due to altered mental status/intubation, subjective information were not able to be obtained from the patient. History was obtained, to the extent possible, from review of the chart and collateral sources of information.      NEURO  Exam: awake, alert, oriented  Meds: aspirin Suppository 300 milliGRAM(s) Rectal daily  HYDROmorphone  Injectable 0.5 milliGRAM(s) IV Push every 3 hours PRN pain  QUEtiapine 25 milliGRAM(s) Oral every 12 hours  [x] Adequacy of sedation and pain control has been assessed and adjusted      RESPIRATORY  RR: 32 (12-20-18 @ 02:30) (15 - 42)  SpO2: 96% (12-20-18 @ 02:30) (96% - 100%)  Exam: unlabored, clear to auscultation bilaterally  Mechanical Ventilation: Mode: CPAP with PS, RR (patient): 30, FiO2: 30, PEEP: 5, PS: 5, MAP: 8  ABG - ( 19 Dec 2018 10:33 )  pH: 7.48  /  pCO2: 26    /  pO2: 94    / HCO3: 19    / Base Excess: -2.9  /  SaO2: 98      Lactate: x      [N/A] Extubation Readiness Assessed  Meds: none      CARDIOVASCULAR  HR: 68 (12-20-18 @ 02:30) (58 - 71)  BP: 97/55 (12-19-18 @ 20:15) (97/55 - 107/58)  BP(mean): 71 (12-19-18 @ 20:15) (71 - 78)  ABP: 104/52 (12-20-18 @ 02:30) (85/42 - 130/63)  ABP(mean): 69 (12-20-18 @ 02:30) (57 - 85)  VBG - ( 19 Dec 2018 02:44 )  pH: 7.37  /  pCO2: 40    /  pO2: 35    / HCO3: 23    / Base Excess: -1.9  /  SaO2: 59     Lactate: x      Exam: regular rate and rhythm  Cardiac Rhythm: sinus  Perfusion     [x]Adequate   [ ]Inadequate  Mentation   [x]Normal       [ ]Reduced  Extremities  [x]Warm         [ ]Cool  Volume Status [ ]Hypervolemic [x]Euvolemic [ ]Hypovolemic  Meds: norepinephrine Infusion 0.3 MICROgram(s)/kG/Min IV Continuous <Continuous>        GI/NUTRITION  Exam: soft, nontender, nondistended, incision C/D/I  Diet: NPO   Meds: pantoprazole  Injectable 40 milliGRAM(s) IV Push daily      GENITOURINARY  I&O's Detail    12-18 @ 07:01  -  12-19 @ 07:00  --------------------------------------------------------  IN:    dexmedetomidine Infusion: 607 mL    Enteral Tube Flush: 10 mL    lactated ringers.: 775 mL    multiple electrolytes Injection Type 1multiple electrolytes Injection Type 1: 300 mL    norepinephrine Infusion: 132.5 mL    Solution: 550 mL    Solution: 250 mL    Solution: 750 mL    vasopressin Infusion: 43.2 mL  Total IN: 3417.7 mL    OUT:    Drain: 80 mL    Indwelling Catheter - Urethral: 1210 mL    Nasoenteral Tube: 450 mL  Total OUT: 1740 mL    Total NET: 1677.7 mL      12-19 @ 07:01  -  12-20 @ 02:45  --------------------------------------------------------  IN:    multiple electrolytes Injection Type 1multiple electrolytes Injection Type 1: 100 mL    norepinephrine Infusion: 26.4 mL    Solution: 100 mL    Solution: 100 mL    vasopressin Infusion: 34.2 mL  Total IN: 360.6 mL    OUT:    Indwelling Catheter - Urethral: 1050 mL    Nasoenteral Tube: 100 mL  Total OUT: 1150 mL    Total NET: -789.4 mL        HISTORY  73y Male    24 HOUR EVENTS:    SUBJECTIVE/ROS:  [ ] A ten-point review of systems was otherwise negative except as noted.  [ ] Due to altered mental status/intubation, subjective information were not able to be obtained from the patient. History was obtained, to the extent possible, from review of the chart and collateral sources of information.      NEURO  RASS:     GCS:     [facility] ICU:  Exam: awake, alert, oriented  Meds: aspirin Suppository 300 milliGRAM(s) Rectal daily  HYDROmorphone  Injectable 0.5 milliGRAM(s) IV Push every 3 hours PRN pain  QUEtiapine 25 milliGRAM(s) Oral every 12 hours    [x] Adequacy of sedation and pain control has been assessed and adjusted      RESPIRATORY  RR: 32 (12-20-18 @ 05:30) (18 - 42)  SpO2: 99% (12-20-18 @ 05:30) (96% - 100%)  Wt(kg): --  Exam: unlabored, clear to auscultation bilaterally  Mechanical Ventilation: Mode: CPAP with PS, RR (patient): 30, FiO2: 30, PEEP: 5, PS: 5, MAP: 8  ABG - ( 20 Dec 2018 03:54 )  pH: 7.51  /  pCO2: 27    /  pO2: 79    / HCO3: 22    / Base Excess: -.2   /  SaO2: 97      Lactate: x                [N/A] Extubation Readiness Assessed  Meds:       CARDIOVASCULAR  HR: 68 (12-20-18 @ 05:30) (59 - 79)  BP: 97/55 (12-19-18 @ 20:15) (97/55 - 107/58)  BP(mean): 71 (12-19-18 @ 20:15) (71 - 78)  ABP: 104/49 (12-20-18 @ 05:30) (85/42 - 130/63)  ABP(mean): 67 (12-20-18 @ 05:30) (57 - 85)  Wt(kg): --  CVP(cm H2O): --  VBG - ( 20 Dec 2018 03:54 )  pH: 7.45  /  pCO2: 35    /  pO2: 31    / HCO3: 24    / Base Excess: 0.7   /  SaO2: 58     Lactate: x                  Exam: regular rate and rhythm  Cardiac Rhythm: sinus  Perfusion     [x]Adequate   [ ]Inadequate  Mentation   [x]Normal       [ ]Reduced  Extremities  [x]Warm         [ ]Cool  Volume Status [ ]Hypervolemic [x]Euvolemic [ ]Hypovolemic  Meds: norepinephrine Infusion 0.3 MICROgram(s)/kG/Min IV Continuous <Continuous>        GI/NUTRITION  Exam: soft, nontender, nondistended, incision C/D/I  Diet:  Meds: pantoprazole  Injectable 40 milliGRAM(s) IV Push daily      GENITOURINARY  I&O's Detail    12-18 @ 07:01  -  12-19 @ 07:00  --------------------------------------------------------  IN:    dexmedetomidine Infusion: 607 mL    Enteral Tube Flush: 10 mL    lactated ringers.: 775 mL    multiple electrolytes Injection Type 1multiple electrolytes Injection Type 1: 300 mL    norepinephrine Infusion: 132.5 mL    Solution: 550 mL    Solution: 250 mL    Solution: 750 mL    vasopressin Infusion: 43.2 mL  Total IN: 3417.7 mL    OUT:    Drain: 80 mL    Indwelling Catheter - Urethral: 1210 mL    Nasoenteral Tube: 450 mL  Total OUT: 1740 mL    Total NET: 1677.7 mL      12-19 @ 07:01  -  12-20 @ 05:36  --------------------------------------------------------  IN:    multiple electrolytes Injection Type 1multiple electrolytes Injection Type 1: 100 mL    norepinephrine Infusion: 31.5 mL    Solution: 200 mL    Solution: 100 mL    vasopressin Infusion: 39.6 mL  Total IN: 471.1 mL    OUT:    Indwelling Catheter - Urethral: 1170 mL    Nasoenteral Tube: 100 mL  Total OUT: 1270 mL    Total NET: -798.9 mL          12-20    134<L>  |  101  |  21  ----------------------------<  134<H>  3.5   |  19<L>  |  1.11    Ca    7.7<L>      20 Dec 2018 04:03  Phos  2.2     12-20  Mg     1.8     12-20    TPro  5.8<L>  /  Alb  2.5<L>  /  TBili  0.4  /  DBili  x   /  AST  83<H>  /  ALT  171<H>  /  AlkPhos  87  12-20    [ ] Velasco catheter, indication: N/A  Meds:       HEMATOLOGIC  Meds: fondaparinux Injectable 2.5 milliGRAM(s) SubCutaneous daily    [x] VTE Prophylaxis                        11.5   8.1   )-----------( 80       ( 20 Dec 2018 04:03 )             36.1     PT/INR - ( 19 Dec 2018 02:48 )   PT: 15.5 sec;   INR: 1.34 ratio         PTT - ( 19 Dec 2018 02:48 )  PTT:31.2 sec  Transfusion     [ ] PRBC   [ ] Platelets   [ ] FFP   [ ] Cryoprecipitate      INFECTIOUS DISEASES  WBC Count: 8.1 K/uL (12-20 @ 04:03)  WBC Count: 8.3 K/uL (12-19 @ 08:13)    RECENT CULTURES:  Specimen Source: .Body Fluid Bile Fluid  Date/Time: 12-17 @ 23:39  Culture Results:   Numerous Citrobacter freundii complex  Gram Stain:   polymorphonuclear leukocytes seen per low power field  Gram Negative Rods per oil power field  by cytocentrifuge  Organism: Citrobacter freundii  Specimen Source: .Blood Blood  Date/Time: 12-17 @ 08:25  Culture Results:   No growth to date.  Gram Stain: --  Organism: --  Specimen Source: .Urine Clean Catch (Midstream)  Date/Time: 12-16 @ 18:08  Culture Results:   <10,000 CFU/ml  Normal Urogenital taylor present  Gram Stain: --  Organism: --  Specimen Source: .Blood Blood  Date/Time: 12-16 @ 18:01  Culture Results:   No growth to date.  Gram Stain: --  Organism: --    Meds: cefTRIAXone   IVPB 2 Gram(s) IV Intermittent every 24 hours        ENDOCRINE  CAPILLARY BLOOD GLUCOSE      POCT Blood Glucose.: 108 mg/dL (19 Dec 2018 23:53)  POCT Blood Glucose.: 114 mg/dL (19 Dec 2018 17:19)  POCT Blood Glucose.: 132 mg/dL (19 Dec 2018 13:17)  POCT Blood Glucose.: 108 mg/dL (19 Dec 2018 06:12)    Meds: insulin lispro (HumaLOG) corrective regimen sliding scale   SubCutaneous every 6 hours  vasopressin Infusion 0.03 Unit(s)/Min IV Continuous <Continuous>        ACCESS DEVICES:  [ ] Peripheral IV  [ ] Central Venous Line	[ ] R	[ ] L	[ ] IJ	[ ] Fem	[ ] SC	Placed:   [ ] Arterial Line		[ ] R	[ ] L	[ ] Fem	[ ] Rad	[ ] Ax	Placed:   [ ] PICC:					[ ] Mediport  [ ] Urinary Catheter, Date Placed:   [x] Necessity of urinary, arterial, and venous catheters discussed    OTHER MEDICATIONS:  chlorhexidine 0.12% Liquid 15 milliLiter(s) Oral Mucosa <User Schedule>  chlorhexidine 4% Liquid 1 Application(s) Topical <User Schedule>  investigational medication - general 50 milliGRAM(s) IV Push every 6 hours  investigational medication - IVPB 1500 milliGRAM(s) IV Intermittent every 6 hours      CODE STATUS: full code       IMAGING: < from: CT Abdomen and Pelvis w/ Oral Cont and w/ IV Cont (12.17.18 @ 12:55) >  IMPRESSION:     Gallbladder wall edema, nonspecific. Differential includes acute   cholecystitis. If there is a persistent concern for acute cholecystitis,   consider nuclear medicine HIDA scan for more definitive evaluation.    Adjacent bowel wall edema involving the hepatic flexure, possibly   reactive to the gallbladder process.    Small ascites in the right lower quadrant.        < end of copied text >      [x] Velasco catheter, indication: urine output monitoring in critically ill   Meds: none      HEMATOLOGIC  Meds: fondaparinux Injectable 2.5 milliGRAM(s) SubCutaneous daily    [x] VTE Prophylaxis                        12.3   8.3   )-----------( 76       ( 19 Dec 2018 08:13 )             35.5     PT/INR - ( 19 Dec 2018 02:48 )   PT: 15.5 sec;   INR: 1.34 ratio         PTT - ( 19 Dec 2018 02:48 )  PTT:31.2 sec  Transfusion     [ ] PRBC   [ ] Platelets   [ ] FFP   [ ] Cryoprecipitate      INFECTIOUS DISEASES  WBC Count: 8.3 K/uL (12-19 @ 08:13)  WBC Count: 8.7 K/uL (12-19 @ 02:48)    RECENT CULTURES:  Specimen Source: .Body Fluid Bile Fluid  Date/Time: 12-17 @ 23:39  Culture Results:   Numerous Citrobacter freundii complex  Gram Stain:   polymorphonuclear leukocytes seen per low power field  Gram Negative Rods per oil power field  by cytocentrifuge  Organism: Citrobacter freundii  Specimen Source: .Blood Blood  Date/Time: 12-17 @ 08:25  Culture Results:   No growth to date.  Gram Stain: --  Organism: --  Specimen Source: .Urine Clean Catch (Midstream)  Date/Time: 12-16 @ 18:08  Culture Results:   <10,000 CFU/ml  Normal Urogenital taylor present  Gram Stain: --  Organism: --  Specimen Source: .Blood Blood  Date/Time: 12-16 @ 18:01  Culture Results:   No growth to date.  Gram Stain: --  Organism: --    Meds: cefTRIAXone   IVPB 2 Gram(s) IV Intermittent every 24 hours        ENDOCRINE  CAPILLARY BLOOD GLUCOSE      POCT Blood Glucose.: 108 mg/dL (19 Dec 2018 23:53)  POCT Blood Glucose.: 114 mg/dL (19 Dec 2018 17:19)  POCT Blood Glucose.: 132 mg/dL (19 Dec 2018 13:17)  POCT Blood Glucose.: 108 mg/dL (19 Dec 2018 06:12)    Meds: insulin lispro (HumaLOG) corrective regimen sliding scale   SubCutaneous every 6 hours  vasopressin Infusion 0.03 Unit(s)/Min IV Continuous <Continuous>        ACCESS DEVICES:  [x] Peripheral IV  [ ] Central Venous Line	[ ] R	[ ] L	[ ] IJ	[ ] Fem	[ ] SC	Placed:   [ ] Arterial Line		[ ] R	[ ] L	[ ] Fem	[ ] Rad	[ ] Ax	Placed:   [ ] PICC:					[ ] Mediport  [ ] Urinary Catheter, Date Placed:   [x] Necessity of urinary, arterial, and venous catheters discussed    OTHER MEDICATIONS:  chlorhexidine 0.12% Liquid 15 milliLiter(s) Oral Mucosa <User Schedule>  chlorhexidine 4% Liquid 1 Application(s) Topical <User Schedule>  investigational medication - general 50 milliGRAM(s) IV Push every 6 hours  investigational medication - IVPB 1500 milliGRAM(s) IV Intermittent every 6 hours      CODE STATUS: full code       IMAGING: < from: CT Abdomen and Pelvis w/ Oral Cont and w/ IV Cont (12.17.18 @ 12:55) >  IMPRESSION:     Gallbladder wall edema, nonspecific. Differential includes acute   cholecystitis. If there is a persistent concern for acute cholecystitis,   consider nuclear medicine HIDA scan for more definitive evaluation.    Adjacent bowel wall edema involving the hepatic flexure, possibly   reactive to the gallbladder process.    Small ascites in the right lower quadrant.        < end of copied text >

## 2018-12-20 NOTE — PROGRESS NOTE ADULT - SUBJECTIVE AND OBJECTIVE BOX
Surgery Progress Note    S: Patient seen and examined. No acute events overnight. Patient was extubated successfully yesterday. Patient remains on low dose pressors this AM, levo=0.02 and vaso=0.03. Patient reports his pain is well controlled. Patient remains NPO without n/v.     O:  Vital Signs Last 24 Hrs  T(C): 37.1 (20 Dec 2018 07:00), Max: 37.2 (19 Dec 2018 23:00)  T(F): 98.8 (20 Dec 2018 07:00), Max: 98.9 (19 Dec 2018 23:00)  HR: 72 (20 Dec 2018 07:00) (59 - 79)  BP: 97/55 (19 Dec 2018 20:15) (97/55 - 107/58)  BP(mean): 71 (19 Dec 2018 20:15) (71 - 78)  RR: 22 (20 Dec 2018 07:00) (20 - 42)  SpO2: 96% (20 Dec 2018 07:00) (96% - 100%)    I&O's Detail    19 Dec 2018 07:01  -  20 Dec 2018 07:00  --------------------------------------------------------  IN:    multiple electrolytes Injection Type 1multiple electrolytes Injection Type 1: 100 mL    norepinephrine Infusion: 34.9 mL    Solution: 100 mL    Solution: 200 mL    Solution: 50 mL    Solution: 63 mL    vasopressin Infusion: 43.2 mL  Total IN: 591.1 mL    OUT:    Drain: 50 mL    Indwelling Catheter - Urethral: 1170 mL    Nasoenteral Tube: 100 mL  Total OUT: 1320 mL    Total NET: -728.9 mL          MEDICATIONS  (STANDING):  aspirin Suppository 300 milliGRAM(s) Rectal daily  cefTRIAXone   IVPB 2 Gram(s) IV Intermittent every 24 hours  chlorhexidine 0.12% Liquid 15 milliLiter(s) Oral Mucosa <User Schedule>  chlorhexidine 4% Liquid 1 Application(s) Topical <User Schedule>  fondaparinux Injectable 2.5 milliGRAM(s) SubCutaneous daily  insulin lispro (HumaLOG) corrective regimen sliding scale   SubCutaneous every 6 hours  investigational medication - general 50 milliGRAM(s) IV Push every 6 hours  investigational medication - IVPB 1500 milliGRAM(s) IV Intermittent every 6 hours  norepinephrine Infusion 0.3 MICROgram(s)/kG/Min (25.509 mL/Hr) IV Continuous <Continuous>  pantoprazole  Injectable 40 milliGRAM(s) IV Push daily  QUEtiapine 25 milliGRAM(s) Oral every 12 hours  vasopressin Infusion 0.03 Unit(s)/Min (1.8 mL/Hr) IV Continuous <Continuous>    MEDICATIONS  (PRN):  HYDROmorphone  Injectable 0.5 milliGRAM(s) IV Push every 3 hours PRN pain                            11.5   8.1   )-----------( 80       ( 20 Dec 2018 04:03 )             36.1       12-20    134<L>  |  101  |  21  ----------------------------<  134<H>  3.5   |  19<L>  |  1.11    Ca    7.7<L>      20 Dec 2018 04:03  Phos  2.2     12-20  Mg     1.8     12-20    TPro  5.8<L>  /  Alb  2.5<L>  /  TBili  0.4  /  DBili  x   /  AST  83<H>  /  ALT  171<H>  /  AlkPhos  87  12-20      Physical Exam:  Gen: Laying in bed, NAD  Resp: Unlabored breathing  Abd: soft, NTND, RUQ drain with murky bilious output, no rebound or guarding  Ext: WWP  Skin: No rashes

## 2018-12-20 NOTE — PROGRESS NOTE ADULT - SUBJECTIVE AND OBJECTIVE BOX
Interventional Radiology Follow- Up Note    This is a 74y/o male with below PMH who presented on 12/16 with RUQ pain found to have acute cholecystitis. Patient was found to have AMS, rigors, tachycardia and hypotension yesterday was transferred to SICU intubated started on pressors and IR was consulted for perc huy for which he underwent with Dr. Berry.     Pt seen and examined at bedside in SICU. now extubated on vasopressor 0.03.     PAST MEDICAL & SURGICAL HISTORY:  DM (diabetes mellitus)  HLD (hyperlipidemia)  HTN (hypertension)  CAD (coronary artery disease)  History of appendectomy  S/P CABG x 4    Allergies: No Known Allergies      LABS:                        11.5   8.1   )-----------( 80       ( 20 Dec 2018 04:03 )             36.1     12-20    134<L>  |  101  |  21  ----------------------------<  134<H>  3.5   |  19<L>  |  1.11    Ca    7.7<L>      20 Dec 2018 04:03  Phos  2.2     12-20  Mg     1.8     12-20    TPro  5.8<L>  /  Alb  2.5<L>  /  TBili  0.4  /  DBili  x   /  AST  83<H>  /  ALT  171<H>  /  AlkPhos  87  12-20    PT/INR - ( 19 Dec 2018 02:48 )   PT: 15.5 sec;   INR: 1.34 ratio         PTT - ( 19 Dec 2018 02:48 )  PTT:31.2 sec    Culture - Body Fluid with Gram Stain (12.17.18 @ 23:39)    Gram Stain:   polymorphonuclear leukocytes seen per low power field  Gram Negative Rods per oil power field  by cytocentrifuge    -  Amikacin: S <=8    -  Amoxicillin/Clavulanic Acid: R >16/8    -  Ampicillin: R 16 These ampicillin results predict results for amoxicillin    -  Ampicillin/Sulbactam: R 8/4    -  Aztreonam: S <=4    -  Cefazolin: R >16    -  Cefepime: S <=2    -  Cefoxitin: R >16    -  Ceftriaxone: S <=1 Enterobacter, Citrobacter, and Serratia may develop resistance during prolonged therapy    -  Ciprofloxacin: S <=0.5    -  Ertapenem: S <=0.5    -  Gentamicin: S <=1    -  Imipenem: S <=1    -  Levofloxacin: S <=1    -  Meropenem: S <=1    -  Piperacillin/Tazobactam: S <=8    -  Tobramycin: S <=2    -  Trimethoprim/Sulfamethoxazole: S <=0.5/9.5    Specimen Source: .Body Fluid Bile Fluid    Culture Results:   Numerous Citrobacter freundii complex    Organism Identification: Citrobacter freundii    Organism: Citrobacter freundii    Method Type: ANGELES      Vitals: T(F): 99.2 (12-20-18 @ 14:37), Max: 99.2 (12-20-18 @ 14:37)  HR: 87 (12-20-18 @ 14:37) (59 - 105)  BP: 110/60 (12-20-18 @ 14:37) (97/55 - 110/60)  RR: 32 (12-20-18 @ 14:37) (14 - 39)  SpO2: 99% (12-20-18 @ 14:37) (96% - 100%)    PHYSICAL EXAM:  Gen: NAD, Awake and alert  Abd: ND, soft, NTTP, huy tube intact to drainage bag with clear bile, 50cc/24hr       A/P: 73y Male admitted with cholecystitis s/p perc huy on 12/17 with Dr. Berry.  -Leukocytosis now resolved  -Continue to trend vitals/ labs  -F/U bile culture  -Continue abx therapy  -Wean off pressors as tolerated  -continue to monitor output    -flush drain per doctor orders, 10cc NS daily  -Continue global care per primary/ SICU team  -will discuss with IR attending     Please call IR at extension [phone] with any questions, concerns, or issues regarding above.

## 2018-12-21 LAB
ALBUMIN SERPL ELPH-MCNC: 2.6 G/DL — LOW (ref 3.3–5)
ALP SERPL-CCNC: 95 U/L — SIGNIFICANT CHANGE UP (ref 40–120)
ALT FLD-CCNC: 126 U/L — HIGH (ref 10–45)
ANION GAP SERPL CALC-SCNC: 13 MMOL/L — SIGNIFICANT CHANGE UP (ref 5–17)
APTT BLD: 27.1 SEC — LOW (ref 27.5–36.3)
AST SERPL-CCNC: 58 U/L — HIGH (ref 10–40)
BILIRUB DIRECT SERPL-MCNC: 0.2 MG/DL — SIGNIFICANT CHANGE UP (ref 0–0.2)
BILIRUB INDIRECT FLD-MCNC: 0.4 MG/DL — SIGNIFICANT CHANGE UP (ref 0.2–1)
BILIRUB SERPL-MCNC: 0.6 MG/DL — SIGNIFICANT CHANGE UP (ref 0.2–1.2)
BUN SERPL-MCNC: 14 MG/DL — SIGNIFICANT CHANGE UP (ref 7–23)
CALCIUM SERPL-MCNC: 8 MG/DL — LOW (ref 8.4–10.5)
CHLORIDE SERPL-SCNC: 105 MMOL/L — SIGNIFICANT CHANGE UP (ref 96–108)
CO2 SERPL-SCNC: 21 MMOL/L — LOW (ref 22–31)
CREAT SERPL-MCNC: 1.18 MG/DL — SIGNIFICANT CHANGE UP (ref 0.5–1.3)
CULTURE RESULTS: SIGNIFICANT CHANGE UP
GLUCOSE BLDC GLUCOMTR-MCNC: 109 MG/DL — HIGH (ref 70–99)
GLUCOSE BLDC GLUCOMTR-MCNC: 84 MG/DL — SIGNIFICANT CHANGE UP (ref 70–99)
GLUCOSE BLDC GLUCOMTR-MCNC: 88 MG/DL — SIGNIFICANT CHANGE UP (ref 70–99)
GLUCOSE BLDC GLUCOMTR-MCNC: 89 MG/DL — SIGNIFICANT CHANGE UP (ref 70–99)
GLUCOSE BLDC GLUCOMTR-MCNC: 94 MG/DL — SIGNIFICANT CHANGE UP (ref 70–99)
GLUCOSE BLDC GLUCOMTR-MCNC: 97 MG/DL — SIGNIFICANT CHANGE UP (ref 70–99)
GLUCOSE SERPL-MCNC: 91 MG/DL — SIGNIFICANT CHANGE UP (ref 70–99)
HCT VFR BLD CALC: 35.2 % — LOW (ref 39–50)
HGB BLD-MCNC: 12.7 G/DL — LOW (ref 13–17)
INR BLD: 1.14 RATIO — SIGNIFICANT CHANGE UP (ref 0.88–1.16)
MAGNESIUM SERPL-MCNC: 2.1 MG/DL — SIGNIFICANT CHANGE UP (ref 1.6–2.6)
MCHC RBC-ENTMCNC: 30.9 PG — SIGNIFICANT CHANGE UP (ref 27–34)
MCHC RBC-ENTMCNC: 36 GM/DL — SIGNIFICANT CHANGE UP (ref 32–36)
MCV RBC AUTO: 85.8 FL — SIGNIFICANT CHANGE UP (ref 80–100)
PHOSPHATE SERPL-MCNC: 1.9 MG/DL — LOW (ref 2.5–4.5)
PLATELET # BLD AUTO: 97 K/UL — LOW (ref 150–400)
POTASSIUM SERPL-MCNC: 3.4 MMOL/L — LOW (ref 3.5–5.3)
POTASSIUM SERPL-SCNC: 3.4 MMOL/L — LOW (ref 3.5–5.3)
PROCALCITONIN SERPL-MCNC: 7.24 NG/ML — HIGH (ref 0.02–0.1)
PROT SERPL-MCNC: 6.3 G/DL — SIGNIFICANT CHANGE UP (ref 6–8.3)
PROTHROM AB SERPL-ACNC: 13.2 SEC — HIGH (ref 10–12.9)
RBC # BLD: 4.1 M/UL — LOW (ref 4.2–5.8)
RBC # FLD: 13.2 % — SIGNIFICANT CHANGE UP (ref 10.3–14.5)
SODIUM SERPL-SCNC: 139 MMOL/L — SIGNIFICANT CHANGE UP (ref 135–145)
SPECIMEN SOURCE: SIGNIFICANT CHANGE UP
WBC # BLD: 6.7 K/UL — SIGNIFICANT CHANGE UP (ref 3.8–10.5)
WBC # FLD AUTO: 6.7 K/UL — SIGNIFICANT CHANGE UP (ref 3.8–10.5)

## 2018-12-21 PROCEDURE — 71045 X-RAY EXAM CHEST 1 VIEW: CPT | Mod: 26

## 2018-12-21 PROCEDURE — 99231 SBSQ HOSP IP/OBS SF/LOW 25: CPT

## 2018-12-21 PROCEDURE — 99232 SBSQ HOSP IP/OBS MODERATE 35: CPT

## 2018-12-21 RX ORDER — POTASSIUM PHOSPHATE, MONOBASIC POTASSIUM PHOSPHATE, DIBASIC 236; 224 MG/ML; MG/ML
30 INJECTION, SOLUTION INTRAVENOUS ONCE
Qty: 0 | Refills: 0 | Status: COMPLETED | OUTPATIENT
Start: 2018-12-21 | End: 2018-12-21

## 2018-12-21 RX ORDER — CLOPIDOGREL BISULFATE 75 MG/1
75 TABLET, FILM COATED ORAL DAILY
Qty: 0 | Refills: 0 | Status: DISCONTINUED | OUTPATIENT
Start: 2018-12-21 | End: 2018-12-23

## 2018-12-21 RX ORDER — PIPERACILLIN AND TAZOBACTAM 4; .5 G/20ML; G/20ML
3.38 INJECTION, POWDER, LYOPHILIZED, FOR SOLUTION INTRAVENOUS EVERY 8 HOURS
Qty: 0 | Refills: 0 | Status: DISCONTINUED | OUTPATIENT
Start: 2018-12-21 | End: 2018-12-22

## 2018-12-21 RX ORDER — SODIUM,POTASSIUM PHOSPHATES 278-250MG
1 POWDER IN PACKET (EA) ORAL
Qty: 0 | Refills: 0 | Status: DISCONTINUED | OUTPATIENT
Start: 2018-12-21 | End: 2018-12-21

## 2018-12-21 RX ORDER — ATORVASTATIN CALCIUM 80 MG/1
80 TABLET, FILM COATED ORAL AT BEDTIME
Qty: 0 | Refills: 0 | Status: DISCONTINUED | OUTPATIENT
Start: 2018-12-21 | End: 2018-12-23

## 2018-12-21 RX ORDER — SENNA PLUS 8.6 MG/1
2 TABLET ORAL AT BEDTIME
Qty: 0 | Refills: 0 | Status: DISCONTINUED | OUTPATIENT
Start: 2018-12-21 | End: 2018-12-23

## 2018-12-21 RX ORDER — METOPROLOL TARTRATE 50 MG
12.5 TABLET ORAL EVERY 12 HOURS
Qty: 0 | Refills: 0 | Status: DISCONTINUED | OUTPATIENT
Start: 2018-12-22 | End: 2018-12-23

## 2018-12-21 RX ORDER — DOCUSATE SODIUM 100 MG
100 CAPSULE ORAL THREE TIMES A DAY
Qty: 0 | Refills: 0 | Status: DISCONTINUED | OUTPATIENT
Start: 2018-12-21 | End: 2018-12-23

## 2018-12-21 RX ORDER — MIRTAZAPINE 45 MG/1
30 TABLET, ORALLY DISINTEGRATING ORAL AT BEDTIME
Qty: 0 | Refills: 0 | Status: DISCONTINUED | OUTPATIENT
Start: 2018-12-21 | End: 2018-12-23

## 2018-12-21 RX ORDER — POTASSIUM CHLORIDE 20 MEQ
20 PACKET (EA) ORAL ONCE
Qty: 0 | Refills: 0 | Status: COMPLETED | OUTPATIENT
Start: 2018-12-21 | End: 2018-12-21

## 2018-12-21 RX ORDER — CILOSTAZOL 100 MG/1
50 TABLET ORAL EVERY 12 HOURS
Qty: 0 | Refills: 0 | Status: DISCONTINUED | OUTPATIENT
Start: 2018-12-21 | End: 2018-12-23

## 2018-12-21 RX ORDER — HALOPERIDOL DECANOATE 100 MG/ML
2.5 INJECTION INTRAMUSCULAR ONCE
Qty: 0 | Refills: 0 | Status: COMPLETED | OUTPATIENT
Start: 2018-12-21 | End: 2018-12-21

## 2018-12-21 RX ADMIN — PIPERACILLIN AND TAZOBACTAM 25 GRAM(S): 4; .5 INJECTION, POWDER, LYOPHILIZED, FOR SOLUTION INTRAVENOUS at 21:19

## 2018-12-21 RX ADMIN — PIPERACILLIN AND TAZOBACTAM 25 GRAM(S): 4; .5 INJECTION, POWDER, LYOPHILIZED, FOR SOLUTION INTRAVENOUS at 05:10

## 2018-12-21 RX ADMIN — ATORVASTATIN CALCIUM 80 MILLIGRAM(S): 80 TABLET, FILM COATED ORAL at 21:19

## 2018-12-21 RX ADMIN — CLOPIDOGREL BISULFATE 75 MILLIGRAM(S): 75 TABLET, FILM COATED ORAL at 11:51

## 2018-12-21 RX ADMIN — CHLORHEXIDINE GLUCONATE 1 APPLICATION(S): 213 SOLUTION TOPICAL at 05:10

## 2018-12-21 RX ADMIN — Medication 100 MILLIGRAM(S): at 21:19

## 2018-12-21 RX ADMIN — Medication 81 MILLIGRAM(S): at 11:51

## 2018-12-21 RX ADMIN — Medication 50 MILLIEQUIVALENT(S): at 08:24

## 2018-12-21 RX ADMIN — CILOSTAZOL 50 MILLIGRAM(S): 100 TABLET ORAL at 17:17

## 2018-12-21 RX ADMIN — MIRTAZAPINE 30 MILLIGRAM(S): 45 TABLET, ORALLY DISINTEGRATING ORAL at 21:19

## 2018-12-21 RX ADMIN — Medication 100 MILLIGRAM(S): at 15:22

## 2018-12-21 RX ADMIN — ENOXAPARIN SODIUM 40 MILLIGRAM(S): 100 INJECTION SUBCUTANEOUS at 11:51

## 2018-12-21 RX ADMIN — POTASSIUM PHOSPHATE, MONOBASIC POTASSIUM PHOSPHATE, DIBASIC 83.33 MILLIMOLE(S): 236; 224 INJECTION, SOLUTION INTRAVENOUS at 12:22

## 2018-12-21 RX ADMIN — HALOPERIDOL DECANOATE 2.5 MILLIGRAM(S): 100 INJECTION INTRAMUSCULAR at 02:27

## 2018-12-21 RX ADMIN — PIPERACILLIN AND TAZOBACTAM 25 GRAM(S): 4; .5 INJECTION, POWDER, LYOPHILIZED, FOR SOLUTION INTRAVENOUS at 14:00

## 2018-12-21 RX ADMIN — QUETIAPINE FUMARATE 25 MILLIGRAM(S): 200 TABLET, FILM COATED ORAL at 09:35

## 2018-12-21 NOTE — PROGRESS NOTE ADULT - SUBJECTIVE AND OBJECTIVE BOX
GENERAL SURGERY DAILY PROGRESS NOTE:       Subjective:    -patient weaned off vasopressor support  -patient's HIT antibodies negative; arixtra discontinued, lovenox restarted  -patient tolerating regular diet and pain controlled  -ceftriaxone discontinued, returned to zosyn  -agitated overnight, received 2.5 mg haldol x1 with improvement in mental status    Objective:    PE:  Gen: resting comfortably in bed, NAD  Resp: breathing comfortably  Abd: soft, NT, ND. IR drain w/ minimal bilious output . No rebound/guarding   : odom in place w light yellow urine     Vital Signs Last 24 Hrs  T(C): 36.4 (21 Dec 2018 11:00), Max: 37.7 (20 Dec 2018 19:00)  T(F): 97.6 (21 Dec 2018 11:00), Max: 99.8 (20 Dec 2018 19:00)  HR: 79 (21 Dec 2018 11:00) (67 - 114)  BP: 122/74 (21 Dec 2018 11:00) (103/61 - 125/67)  BP(mean): 92 (21 Dec 2018 11:00) (77 - 92)  RR: 36 (21 Dec 2018 11:00) (15 - 39)  SpO2: 99% (21 Dec 2018 11:00) (93% - 99%)    I&O's Detail    20 Dec 2018 07:01  -  21 Dec 2018 07:00  --------------------------------------------------------  IN:    Enteral Tube Flush: 10 mL    norepinephrine Infusion: 0.9 mL    Oral Fluid: 360 mL    Solution: 175 mL    Solution: 452 mL    Solution: 300 mL    Solution: 400 mL    vasopressin Infusion: 9 mL  Total IN: 1706.9 mL    OUT:    Drain: 140 mL    Indwelling Catheter - Urethral: 5785 mL  Total OUT: 5925 mL    Total NET: -4218.1 mL      21 Dec 2018 07:01  -  21 Dec 2018 11:44  --------------------------------------------------------  IN:    Solution: 25 mL    Solution: 200 mL  Total IN: 225 mL    OUT:    Indwelling Catheter - Urethral: 475 mL    Voided: 100 mL  Total OUT: 575 mL    Total NET: -350 mL          Daily     Daily     MEDICATIONS  (STANDING):  aspirin enteric coated 81 milliGRAM(s) Oral daily  atorvastatin 80 milliGRAM(s) Oral at bedtime  chlorhexidine 4% Liquid 1 Application(s) Topical <User Schedule>  cilostazol 50 milliGRAM(s) Oral every 12 hours  clopidogrel Tablet 75 milliGRAM(s) Oral daily  docusate sodium 100 milliGRAM(s) Oral three times a day  enoxaparin Injectable 40 milliGRAM(s) SubCutaneous daily  insulin lispro (HumaLOG) corrective regimen sliding scale   SubCutaneous three times a day before meals  insulin lispro (HumaLOG) corrective regimen sliding scale   SubCutaneous at bedtime  piperacillin/tazobactam IVPB. 3.375 Gram(s) IV Intermittent every 8 hours  potassium phosphate IVPB 30 milliMole(s) IV Intermittent once  QUEtiapine 25 milliGRAM(s) Oral every 12 hours  senna 2 Tablet(s) Oral at bedtime    MEDICATIONS  (PRN):      LABS:                        12.7   6.7   )-----------( 97       ( 21 Dec 2018 03:15 )             35.2     12-21    139  |  105  |  14  ----------------------------<  91  3.4<L>   |  21<L>  |  1.18    Ca    8.0<L>      21 Dec 2018 03:15  Phos  1.9     12-21  Mg     2.1     12-21    TPro  6.3  /  Alb  2.6<L>  /  TBili  0.6  /  DBili  0.2  /  AST  58<H>  /  ALT  126<H>  /  AlkPhos  95  12-21    PT/INR - ( 21 Dec 2018 03:15 )   PT: 13.2 sec;   INR: 1.14 ratio         PTT - ( 21 Dec 2018 03:15 )  PTT:27.1 sec      RADIOLOGY & ADDITIONAL STUDIES:

## 2018-12-21 NOTE — PROGRESS NOTE ADULT - SUBJECTIVE AND OBJECTIVE BOX
SICU PROGRESS NOTE  ================================================  HPI:  72 yo M with PMH of CABGx4 2016,  PAD s/pp LLE stent 2017 on ASA/Plavix, Pletal, remote appendectomy, HTN, DM, HLD who presents with abdominal pain since 2AM on 12/16/18. The pain is worst in RUQ. He endorses nausea but no vomiting. He is having chills. He had a similar episode 2 weeks ago and was worked up without avail. Last PO intake 12/16/18 at 10am, just a small piece of bread. He denies CP/SOB/palpitations. He took Plavix on the morning of 12/16. Pt was on 9 Primitivo treated with IV antibiotics for acute cholecystitis. On the morning of 12/17 pt became tachycardic to 130s with fevers and rigors and a decrease in BP (101 systolic from 130s). He was also altered. Pt developed diarrhea and C. diff sample sent as well. SICU consult called for closer monitoring. Plan for pt to go to CT abdomen/pelvis and then plan for IR for perc huy vs OR for cholecystectomy. Upon arrival to SICU, pt self D/Yovani IV and very restless and agitated. Pt also tachypneic, with O2 sats in low 90s. Decision was made to intubate patient to secure airway. Pt then became hypotensive and received 1.5 L LR fluid bolus. Left radial arterial line and right IJ central line placed, pt started on vasopressors. Pt's daughter, Tamra, initially at bedside and aware of pt's transfer to SICU. She was called and made aware of intubation, central line.    24 HOUR EVENTS  -patient weaned off levo and vaso, stable without vasopressor support  -pain medications discontinued  -patient received lasix IV x2, diuresed 1.5L  -patient's HIT antibodies negative; arixtra discontinued, lovenox restarted  -patient tolerating regular diet  -ceftriaxone discontinued, returned to [facility] due to culture sensitivities  -blood glucose 80 overnight, treated with oral glucose intake  -agitated overnight, received 2.5 mg haldol x1 with improvement in mental status    Objective:  Vital Signs  ICU Vital Signs Last 24 Hrs  T(C): 37.2 (21 Dec 2018 03:00), Max: 37.7 (20 Dec 2018 19:00)  T(F): 98.9 (21 Dec 2018 03:00), Max: 99.8 (20 Dec 2018 19:00)  HR: 76 (21 Dec 2018 04:00) (65 - 114)  BP: 115/63 (21 Dec 2018 04:00) (103/61 - 123/66)  BP(mean): 83 (21 Dec 2018 04:00) (77 - 88)  ABP: 98/57 (20 Dec 2018 15:00) (91/69 - 124/65)  ABP(mean): 87 (20 Dec 2018 15:00) (64 - 88)  RR: 24 (21 Dec 2018 04:00) (14 - 39)  SpO2: 97% (21 Dec 2018 04:00) (93% - 100%)    I&O's Detail    19 Dec 2018 07:01  -  20 Dec 2018 07:00  --------------------------------------------------------  IN:    multiple electrolytes Injection Type 1multiple electrolytes Injection Type 1: 100 mL    norepinephrine Infusion: 34.9 mL    Solution: 50 mL    Solution: 63 mL    Solution: 200 mL    Solution: 100 mL    vasopressin Infusion: 43.2 mL  Total IN: 591.1 mL    OUT:    Drain: 50 mL    Indwelling Catheter - Urethral: 1230 mL    Nasoenteral Tube: 100 mL  Total OUT: 1380 mL    Total NET: -788.9 mL      20 Dec 2018 07:01  -  21 Dec 2018 04:40  --------------------------------------------------------  IN:    Enteral Tube Flush: 10 mL    norepinephrine Infusion: 0.9 mL    Oral Fluid: 240 mL    Solution: 100 mL    Solution: 400 mL    Solution: 300 mL    Solution: 452 mL    vasopressin Infusion: 9 mL  Total IN: 1511.9 mL    OUT:    Drain: 40 mL    Indwelling Catheter - Urethral: 5460 mL  Total OUT: 5500 mL    Total NET: -3988.1 mL    Diet: Diet, Regular:   Low Fat (LOWFAT) (12-20-18 @ 12:16)      MEDICATIONS  (STANDING):  aspirin enteric coated 81 milliGRAM(s) Oral daily  chlorhexidine 4% Liquid 1 Application(s) Topical <User Schedule>  enoxaparin Injectable 40 milliGRAM(s) SubCutaneous daily  insulin lispro (HumaLOG) corrective regimen sliding scale   SubCutaneous three times a day before meals  insulin lispro (HumaLOG) corrective regimen sliding scale   SubCutaneous at bedtime  investigational medication - general 50 milliGRAM(s) IV Push every 6 hours  investigational medication - IVPB 1500 milliGRAM(s) IV Intermittent every 6 hours  piperacillin/tazobactam IVPB. 3.375 Gram(s) IV Intermittent every 8 hours  potassium acid phosphate/sodium acid phosphate tablet (K-PHOS No. 2) 1 Tablet(s) Oral four times a day with meals  QUEtiapine 25 milliGRAM(s) Oral every 12 hours    MEDICATIONS  (PRN):      PHYSICAL EXAM:  GEN: NAD, resting quietly, intubated, sedated  NEURO: CN II-XII grossly intact, no focal deficits  PULM: symmetric chest rise bilaterally, no increased WOB  CV: regular rate, peripheral pulses intact  ABD: soft, NTND, PCT draining bilious fluid  EXTR: no cyanosis or edema, moving all extremities    LABS  CBC (12-21 @ 03:15)                              12.7<L>                         6.7     )----------------(  97<L>      --    % Neutrophils, --    % Lymphocytes, ANC: --                                  35.2<L>              CBC (12-20 @ 14:58)                              12.9<L>                         8.5     )----------------(  84<L>      --    % Neutrophils, --    % Lymphocytes, ANC: --                                  37.2<L>                BMP (12-21 @ 03:15)             139     |  105     |  14    		Ca++ --      Ca 8.0<L>             ---------------------------------( 91    		Mg 2.1                3.4<L>  |  21<L>   |  1.18  			Ph 1.9<L>  BMP (12-20 @ 14:58)             136     |  100     |  18    		Ca++ --      Ca 8.4                ---------------------------------( 115<H>		Mg --                 3.1<L>  |  22      |  1.17  			Ph --        LFTs (12-21 @ 03:15)      TPro 6.3 / Alb 2.6<L> / TBili 0.6 / DBili 0.2 / AST 58<H> / <H> / AlkPhos 95  LFTs (12-20 @ 14:58)      TPro 6.7 / Alb 3.0<L> / TBili 0.6 / DBili -- / AST 76<H> / <H> / AlkPhos 99    Coags (12-21 @ 03:15)  aPTT 27.1<L> / INR 1.14 / PT 13.2<H>    ABG (12-20 @ 14:58)      /  /  /  /  / %     Lactate:  1.8   ABG (12-20 @ 03:54)     7.51<H> / 27<L> / 79 / 22 / -.2 / 97<H>%     Lactate:       VBG (12-20 @ 03:54)     7.45 / 35 / 31 / 24 / 0.7 / 58<L>%    -> .Body Fluid Bile Fluid Culture (12-17 @ 23:39)       polymorphonuclear leukocytes seen per low power field  Gram Negative Rods per oil power field  by cytocentrifuge    Citrobacter freundii    Numerous Citrobacter freundii complex    -> .Blood Blood Culture (12-17 @ 08:25)     NG    NG    No growth to date.    -> .Urine Clean Catch (Midstream) Culture (12-16 @ 18:08)     NG    NG    <10,000 CFU/ml  Normal Urogenital taylor present    -> .Blood Blood Culture (12-16 @ 18:01)     NG    NG    No growth to date.        CULTURES  .Body Fluid Bile Fluid  12-17 @ 23:39   Numerous Citrobacter freundii complex  --  Citrobacter freundii      .Blood Blood  12-17 @ 08:25   No growth to date.  --  --      .Urine Clean Catch (Midstream)  12-16 @ 18:08   <10,000 CFU/ml  Normal Urogenital taylor present  --  --      .Blood Blood  12-16 @ 18:01   No growth to date.  --  --      IMAGING:   < from: Xray Chest 1 View- PORTABLE-Routine (12.20.18 @ 07:10) >    EXAM:  XR CHEST PORTABLE ROUTINE 1V                            PROCEDURE DATE:  12/20/2018            INTERPRETATION:  A single chest x-ray was obtained on December 19, 2018.    Assess pulmonary vascular congestion. Mild shortness of breath.    Impression:    The heart is normal in size. The lungs appear to be clear. Endotracheal   tube and NG tube were removed. A PICC line is seen on the right and the   tip is superior vena cava. No pneumothorax. Status post sternotomy.    < end of copied text >

## 2018-12-22 LAB
ALBUMIN SERPL ELPH-MCNC: 3 G/DL — LOW (ref 3.3–5)
ALBUMIN SERPL ELPH-MCNC: 3.3 G/DL — SIGNIFICANT CHANGE UP (ref 3.3–5)
ALP SERPL-CCNC: 107 U/L — SIGNIFICANT CHANGE UP (ref 40–120)
ALP SERPL-CCNC: 97 U/L — SIGNIFICANT CHANGE UP (ref 40–120)
ALT FLD-CCNC: 102 U/L — HIGH (ref 10–45)
ALT FLD-CCNC: 97 U/L — HIGH (ref 10–45)
ANION GAP SERPL CALC-SCNC: 14 MMOL/L — SIGNIFICANT CHANGE UP (ref 5–17)
ANION GAP SERPL CALC-SCNC: 16 MMOL/L — SIGNIFICANT CHANGE UP (ref 5–17)
AST SERPL-CCNC: 44 U/L — HIGH (ref 10–40)
AST SERPL-CCNC: 50 U/L — HIGH (ref 10–40)
BILIRUB DIRECT SERPL-MCNC: 0.2 MG/DL — SIGNIFICANT CHANGE UP (ref 0–0.2)
BILIRUB INDIRECT FLD-MCNC: 0.4 MG/DL — SIGNIFICANT CHANGE UP (ref 0.2–1)
BILIRUB SERPL-MCNC: 0.6 MG/DL — SIGNIFICANT CHANGE UP (ref 0.2–1.2)
BILIRUB SERPL-MCNC: 0.6 MG/DL — SIGNIFICANT CHANGE UP (ref 0.2–1.2)
BUN SERPL-MCNC: 11 MG/DL — SIGNIFICANT CHANGE UP (ref 7–23)
BUN SERPL-MCNC: 9 MG/DL — SIGNIFICANT CHANGE UP (ref 7–23)
CALCIUM SERPL-MCNC: 8.5 MG/DL — SIGNIFICANT CHANGE UP (ref 8.4–10.5)
CALCIUM SERPL-MCNC: 9 MG/DL — SIGNIFICANT CHANGE UP (ref 8.4–10.5)
CHLORIDE SERPL-SCNC: 102 MMOL/L — SIGNIFICANT CHANGE UP (ref 96–108)
CHLORIDE SERPL-SCNC: 104 MMOL/L — SIGNIFICANT CHANGE UP (ref 96–108)
CO2 SERPL-SCNC: 18 MMOL/L — LOW (ref 22–31)
CO2 SERPL-SCNC: 21 MMOL/L — LOW (ref 22–31)
CREAT SERPL-MCNC: 1.01 MG/DL — SIGNIFICANT CHANGE UP (ref 0.5–1.3)
CREAT SERPL-MCNC: 1.16 MG/DL — SIGNIFICANT CHANGE UP (ref 0.5–1.3)
CULTURE RESULTS: SIGNIFICANT CHANGE UP
CULTURE RESULTS: SIGNIFICANT CHANGE UP
GLUCOSE BLDC GLUCOMTR-MCNC: 101 MG/DL — HIGH (ref 70–99)
GLUCOSE BLDC GLUCOMTR-MCNC: 106 MG/DL — HIGH (ref 70–99)
GLUCOSE BLDC GLUCOMTR-MCNC: 108 MG/DL — HIGH (ref 70–99)
GLUCOSE BLDC GLUCOMTR-MCNC: 155 MG/DL — HIGH (ref 70–99)
GLUCOSE SERPL-MCNC: 118 MG/DL — HIGH (ref 70–99)
GLUCOSE SERPL-MCNC: 126 MG/DL — HIGH (ref 70–99)
HCT VFR BLD CALC: 39.7 % — SIGNIFICANT CHANGE UP (ref 39–50)
HCT VFR BLD CALC: 40 % — SIGNIFICANT CHANGE UP (ref 39–50)
HCT VFR BLD CALC: 41.1 % — SIGNIFICANT CHANGE UP (ref 39–50)
HGB BLD-MCNC: 13.9 G/DL — SIGNIFICANT CHANGE UP (ref 13–17)
HGB BLD-MCNC: 14 G/DL — SIGNIFICANT CHANGE UP (ref 13–17)
HGB BLD-MCNC: 14.2 G/DL — SIGNIFICANT CHANGE UP (ref 13–17)
LACTATE SERPL-SCNC: 1.7 MMOL/L — SIGNIFICANT CHANGE UP (ref 0.7–2)
MAGNESIUM SERPL-MCNC: 2.1 MG/DL — SIGNIFICANT CHANGE UP (ref 1.6–2.6)
MCHC RBC-ENTMCNC: 29.9 PG — SIGNIFICANT CHANGE UP (ref 27–34)
MCHC RBC-ENTMCNC: 30.6 PG — SIGNIFICANT CHANGE UP (ref 27–34)
MCHC RBC-ENTMCNC: 30.7 PG — SIGNIFICANT CHANGE UP (ref 27–34)
MCHC RBC-ENTMCNC: 34.6 GM/DL — SIGNIFICANT CHANGE UP (ref 32–36)
MCHC RBC-ENTMCNC: 34.8 GM/DL — SIGNIFICANT CHANGE UP (ref 32–36)
MCHC RBC-ENTMCNC: 35.3 GM/DL — SIGNIFICANT CHANGE UP (ref 32–36)
MCV RBC AUTO: 86.5 FL — SIGNIFICANT CHANGE UP (ref 80–100)
MCV RBC AUTO: 87.1 FL — SIGNIFICANT CHANGE UP (ref 80–100)
MCV RBC AUTO: 87.8 FL — SIGNIFICANT CHANGE UP (ref 80–100)
ORGANISM # SPEC MICROSCOPIC CNT: SIGNIFICANT CHANGE UP
ORGANISM # SPEC MICROSCOPIC CNT: SIGNIFICANT CHANGE UP
PHOSPHATE SERPL-MCNC: 3.7 MG/DL — SIGNIFICANT CHANGE UP (ref 2.5–4.5)
PLATELET # BLD AUTO: 151 K/UL — SIGNIFICANT CHANGE UP (ref 150–400)
PLATELET # BLD AUTO: 192 K/UL — SIGNIFICANT CHANGE UP (ref 150–400)
PLATELET # BLD AUTO: SIGNIFICANT CHANGE UP (ref 150–400)
POTASSIUM SERPL-MCNC: 3.8 MMOL/L — SIGNIFICANT CHANGE UP (ref 3.5–5.3)
POTASSIUM SERPL-MCNC: 4.4 MMOL/L — SIGNIFICANT CHANGE UP (ref 3.5–5.3)
POTASSIUM SERPL-SCNC: 3.8 MMOL/L — SIGNIFICANT CHANGE UP (ref 3.5–5.3)
POTASSIUM SERPL-SCNC: 4.4 MMOL/L — SIGNIFICANT CHANGE UP (ref 3.5–5.3)
PROCALCITONIN SERPL-MCNC: 2.76 NG/ML — HIGH (ref 0.02–0.1)
PROT SERPL-MCNC: 6.7 G/DL — SIGNIFICANT CHANGE UP (ref 6–8.3)
PROT SERPL-MCNC: 7.2 G/DL — SIGNIFICANT CHANGE UP (ref 6–8.3)
RBC # BLD: 4.56 M/UL — SIGNIFICANT CHANGE UP (ref 4.2–5.8)
RBC # BLD: 4.56 M/UL — SIGNIFICANT CHANGE UP (ref 4.2–5.8)
RBC # BLD: 4.75 M/UL — SIGNIFICANT CHANGE UP (ref 4.2–5.8)
RBC # FLD: 13.2 % — SIGNIFICANT CHANGE UP (ref 10.3–14.5)
SODIUM SERPL-SCNC: 136 MMOL/L — SIGNIFICANT CHANGE UP (ref 135–145)
SODIUM SERPL-SCNC: 139 MMOL/L — SIGNIFICANT CHANGE UP (ref 135–145)
SPECIMEN SOURCE: SIGNIFICANT CHANGE UP
SPECIMEN SOURCE: SIGNIFICANT CHANGE UP
WBC # BLD: 5 K/UL — SIGNIFICANT CHANGE UP (ref 3.8–10.5)
WBC # BLD: 6.1 K/UL — SIGNIFICANT CHANGE UP (ref 3.8–10.5)
WBC # BLD: 7 K/UL — SIGNIFICANT CHANGE UP (ref 3.8–10.5)
WBC # FLD AUTO: 5 K/UL — SIGNIFICANT CHANGE UP (ref 3.8–10.5)
WBC # FLD AUTO: 6.1 K/UL — SIGNIFICANT CHANGE UP (ref 3.8–10.5)
WBC # FLD AUTO: 7 K/UL — SIGNIFICANT CHANGE UP (ref 3.8–10.5)

## 2018-12-22 RX ORDER — PIPERACILLIN AND TAZOBACTAM 4; .5 G/20ML; G/20ML
3.38 INJECTION, POWDER, LYOPHILIZED, FOR SOLUTION INTRAVENOUS EVERY 8 HOURS
Qty: 0 | Refills: 0 | Status: COMPLETED | OUTPATIENT
Start: 2018-12-22 | End: 2018-12-22

## 2018-12-22 RX ORDER — HYDROMORPHONE HYDROCHLORIDE 2 MG/ML
0.5 INJECTION INTRAMUSCULAR; INTRAVENOUS; SUBCUTANEOUS ONCE
Qty: 0 | Refills: 0 | Status: DISCONTINUED | OUTPATIENT
Start: 2018-12-22 | End: 2018-12-22

## 2018-12-22 RX ORDER — GABAPENTIN 400 MG/1
100 CAPSULE ORAL THREE TIMES A DAY
Qty: 0 | Refills: 0 | Status: DISCONTINUED | OUTPATIENT
Start: 2018-12-22 | End: 2018-12-23

## 2018-12-22 RX ADMIN — GABAPENTIN 100 MILLIGRAM(S): 400 CAPSULE ORAL at 05:59

## 2018-12-22 RX ADMIN — ENOXAPARIN SODIUM 40 MILLIGRAM(S): 100 INJECTION SUBCUTANEOUS at 11:43

## 2018-12-22 RX ADMIN — GABAPENTIN 100 MILLIGRAM(S): 400 CAPSULE ORAL at 21:27

## 2018-12-22 RX ADMIN — HYDROMORPHONE HYDROCHLORIDE 0.5 MILLIGRAM(S): 2 INJECTION INTRAMUSCULAR; INTRAVENOUS; SUBCUTANEOUS at 06:05

## 2018-12-22 RX ADMIN — Medication 100 MILLIGRAM(S): at 13:26

## 2018-12-22 RX ADMIN — PIPERACILLIN AND TAZOBACTAM 25 GRAM(S): 4; .5 INJECTION, POWDER, LYOPHILIZED, FOR SOLUTION INTRAVENOUS at 05:32

## 2018-12-22 RX ADMIN — GABAPENTIN 100 MILLIGRAM(S): 400 CAPSULE ORAL at 13:26

## 2018-12-22 RX ADMIN — PIPERACILLIN AND TAZOBACTAM 25 GRAM(S): 4; .5 INJECTION, POWDER, LYOPHILIZED, FOR SOLUTION INTRAVENOUS at 17:17

## 2018-12-22 RX ADMIN — Medication 81 MILLIGRAM(S): at 11:44

## 2018-12-22 RX ADMIN — Medication 12.5 MILLIGRAM(S): at 17:16

## 2018-12-22 RX ADMIN — CILOSTAZOL 50 MILLIGRAM(S): 100 TABLET ORAL at 05:33

## 2018-12-22 RX ADMIN — Medication 12.5 MILLIGRAM(S): at 05:32

## 2018-12-22 RX ADMIN — Medication 100 MILLIGRAM(S): at 21:27

## 2018-12-22 RX ADMIN — HYDROMORPHONE HYDROCHLORIDE 0.5 MILLIGRAM(S): 2 INJECTION INTRAMUSCULAR; INTRAVENOUS; SUBCUTANEOUS at 06:20

## 2018-12-22 RX ADMIN — SENNA PLUS 2 TABLET(S): 8.6 TABLET ORAL at 21:27

## 2018-12-22 RX ADMIN — ATORVASTATIN CALCIUM 80 MILLIGRAM(S): 80 TABLET, FILM COATED ORAL at 21:27

## 2018-12-22 RX ADMIN — CLOPIDOGREL BISULFATE 75 MILLIGRAM(S): 75 TABLET, FILM COATED ORAL at 11:44

## 2018-12-22 RX ADMIN — CILOSTAZOL 50 MILLIGRAM(S): 100 TABLET ORAL at 17:17

## 2018-12-22 RX ADMIN — Medication 100 MILLIGRAM(S): at 05:35

## 2018-12-22 RX ADMIN — CHLORHEXIDINE GLUCONATE 1 APPLICATION(S): 213 SOLUTION TOPICAL at 05:35

## 2018-12-22 RX ADMIN — Medication 2: at 11:46

## 2018-12-22 NOTE — CHART NOTE - NSCHARTNOTEFT_GEN_A_CORE
GENERAL SURGERY FLOOR TRANSFER NOTE    73y Male s/p percutaneous cholecystostomy tube placement for acute cholecystitis transferred to floor from SICU    SUBJECTIVE: Patient was transferred to the general surgical floor and is feeling well. Patient to receive final dose of zosyn today. Patient reports his pain is well controlled. Patient is tolerating diet without n/v or pain.       OBJECTIVE:    T(C): 36.9 (12-22-18 @ 14:11), Max: 37.4 (12-21-18 @ 15:00)  HR: 80 (12-22-18 @ 14:11) (79 - 91)  BP: 138/95 (12-22-18 @ 14:11) (113/65 - 141/70)  RR: 18 (12-22-18 @ 14:11) (18 - 30)  SpO2: 97% (12-22-18 @ 14:11) (94% - 100%)  Wt(kg): --      I&O's Summary    21 Dec 2018 07:01  -  22 Dec 2018 07:00  --------------------------------------------------------  IN: 1454.5 mL / OUT: 1345 mL / NET: 109.5 mL    22 Dec 2018 07:01  -  22 Dec 2018 14:29  --------------------------------------------------------  IN: 275 mL / OUT: 0 mL / NET: 275 mL                              14.0   6.1   )-----------( 151      ( 22 Dec 2018 08:42 )             39.7       12-22    136  |  104  |  9   ----------------------------<  118<H>  3.8   |  18<L>  |  1.01    Ca    8.5      22 Dec 2018 08:42  Phos  3.7     12-22  Mg     2.1     12-22    TPro  6.7  /  Alb  3.0<L>  /  TBili  0.6  /  DBili  0.2  /  AST  44<H>  /  ALT  97<H>  /  AlkPhos  97  12-22      MEDICATIONS  (STANDING):  aspirin enteric coated 81 milliGRAM(s) Oral daily  atorvastatin 80 milliGRAM(s) Oral at bedtime  chlorhexidine 4% Liquid 1 Application(s) Topical <User Schedule>  cilostazol 50 milliGRAM(s) Oral every 12 hours  clopidogrel Tablet 75 milliGRAM(s) Oral daily  docusate sodium 100 milliGRAM(s) Oral three times a day  enoxaparin Injectable 40 milliGRAM(s) SubCutaneous daily  gabapentin 100 milliGRAM(s) Oral three times a day  insulin lispro (HumaLOG) corrective regimen sliding scale   SubCutaneous three times a day before meals  insulin lispro (HumaLOG) corrective regimen sliding scale   SubCutaneous at bedtime  metoprolol tartrate 12.5 milliGRAM(s) Oral every 12 hours  mirtazapine 30 milliGRAM(s) Oral at bedtime  piperacillin/tazobactam IVPB. 3.375 Gram(s) IV Intermittent every 8 hours  senna 2 Tablet(s) Oral at bedtime    MEDICATIONS  (PRN):        PHYSICAL EXAM:  Gen: Laying in bed, NAD  Resp: Unlabored breathing  Abd: soft, NTND, RUQ drain with bilious output  Ext: WWP  Skin: No rashes    A/P:  73y Male s/p percutaneous cholecystostomy    -last dose of zosyn today  -OOB and ambulating as tolerated  -f/u SW/CM regarding home PT and home nursing care  -c/w low fat diet  -lovenox for DVT ppx  -pain control as needed    Trauma Surgery  [phone]

## 2018-12-22 NOTE — PROGRESS NOTE ADULT - SUBJECTIVE AND OBJECTIVE BOX
HISTORY  74 yo M with PMH of CABGx4 2016,  PAD s/pp LLE stent 2017 on ASA/Plavix, Pletal, remote appendectomy, HTN, DM, HLD who presents with abdominal pain since 2AM on 12/16/18. The pain is worst in RUQ. He endorses nausea but no vomiting. He is having chills. He had a similar episode 2 weeks ago and was worked up without avail. Last PO intake 12/16/18 at 10am, just a small piece of bread. He denies CP/SOB/palpitations. He took Plavix on the morning of 12/16. Pt was on 9 Primitivo treated with IV antibiotics for acute cholecystitis. On the morning of 12/17 pt became tachycardic to 130s with fevers and rigors and a decrease in BP (101 systolic from 130s). He was also altered. Pt developed diarrhea and C. diff sample sent as well. SICU consult called for closer monitoring. Plan for pt to go to CT abdomen/pelvis and then plan for IR for perc huy vs OR for cholecystectomy. Upon arrival to SICU, pt self D/Yovani IV and very restless and agitated. Pt also tachypneic, with O2 sats in low 90s. Decision was made to intubate patient to secure airway. Pt then became hypotensive and received 1.5 L LR fluid bolus. Left radial arterial line and right IJ central line placed, pt started on vasopressors. Pt's daughter, Tamra, initially at bedside and aware of pt's transfer to SICU. She was called and made aware of intubation, central line. Underwent percutaneous cholecystostomy on 12/17. Now extubated, off pressors, tolerating regular diet.    24 HOUR EVENTS:  - Home medications restarted  - Central line removed  - Patient listed for transfer to floor      NEURO  Exam: A&O x 3  Meds: mirtazapine 30 milliGRAM(s) Oral at bedtime    [x] Adequacy of sedation and pain control has been assessed and adjusted      RESPIRATORY  RR: 30 (12-22-18 @ 00:00) (20 - 39)  SpO2: 98% (12-22-18 @ 00:00) (96% - 100%)  Exam: unlabored, clear to auscultation bilaterally  ABG - ( 20 Dec 2018 14:58 )  pH: x     /  pCO2: x     /  pO2: x     / HCO3: x     / Base Excess: x     /  SaO2: x       Lactate: 1.8        CARDIOVASCULAR  HR: 87 (12-22-18 @ 00:00) (76 - 91)  BP: 139/82 (12-21-18 @ 23:00) (111/62 - 139/82)  BP(mean): 101 (12-21-18 @ 23:00) (81 - 101)  VBG - ( 20 Dec 2018 03:54 )  pH: 7.45  /  pCO2: 35    /  pO2: 31    / HCO3: 24    / Base Excess: 0.7   /  SaO2: 58     Lactate: x          Exam:  Cardiac Rhythm: Regular  Meds: metoprolol tartrate 12.5 milliGRAM(s) Oral every 12 hours      GI/NUTRITION  Exam: Soft, NT, ND  Diet: Regular  Meds: docusate sodium 100 milliGRAM(s) Oral three times a day  senna 2 Tablet(s) Oral at bedtime      GENITOURINARY  I&O's Detail    12-20 @ 07:01  -  12-21 @ 07:00  --------------------------------------------------------  IN:    Enteral Tube Flush: 10 mL    norepinephrine Infusion: 0.9 mL    Oral Fluid: 360 mL    Solution: 175 mL    Solution: 452 mL    Solution: 300 mL    Solution: 400 mL    vasopressin Infusion: 9 mL  Total IN: 1706.9 mL    OUT:    Drain: 140 mL    Indwelling Catheter - Urethral: 5785 mL  Total OUT: 5925 mL    Total NET: -4218.1 mL      12-21 @ 07:01  -  12-22 @ 01:59  --------------------------------------------------------  IN:    Oral Fluid: 240 mL    Solution: 200 mL    Solution: 699.5 mL  Total IN: 1139.5 mL    OUT:    Drain: 70 mL    Indwelling Catheter - Urethral: 475 mL    Voided: 650 mL  Total OUT: 1195 mL    Total NET: -55.5 mL      12-21    139  |  105  |  14  ----------------------------<  91  3.4<L>   |  21<L>  |  1.18    Ca    8.0<L>      21 Dec 2018 03:15  Phos  1.9     12-21  Mg     2.1     12-21    TPro  6.3  /  Alb  2.6<L>  /  TBili  0.6  /  DBili  0.2  /  AST  58<H>  /  ALT  126<H>  /  AlkPhos  95  12-21      HEMATOLOGIC  Meds: aspirin enteric coated 81 milliGRAM(s) Oral daily  cilostazol 50 milliGRAM(s) Oral every 12 hours  clopidogrel Tablet 75 milliGRAM(s) Oral daily  enoxaparin Injectable 40 milliGRAM(s) SubCutaneous daily    [x] VTE Prophylaxis                        12.7   6.7   )-----------( 97       ( 21 Dec 2018 03:15 )             35.2     PT/INR - ( 21 Dec 2018 03:15 )   PT: 13.2 sec;   INR: 1.14 ratio         PTT - ( 21 Dec 2018 03:15 )  PTT:27.1 sec  Transfusion     [ ] PRBC   [ ] Platelets   [ ] FFP   [ ] Cryoprecipitate      INFECTIOUS DISEASES  T(C): 37.1 (12-21-18 @ 23:00), Max: 37.4 (12-21-18 @ 15:00)  Wt(kg): --  WBC Count: 6.7 K/uL (12-21 @ 03:15)    Recent Cultures:  Specimen Source: .Body Fluid Bile Fluid, 12-17 @ 23:39; Results   Numerous Citrobacter freundii complex; Gram Stain:   polymorphonuclear leukocytes seen per low power field  Gram Negative Rods per oil power field  by cytocentrifuge; Organism: Citrobacter freundii  Specimen Source: .Blood Blood, 12-17 @ 08:25; Results   No growth to date.; Gram Stain: --; Organism: --  Specimen Source: .Urine Clean Catch (Midstream), 12-16 @ 18:08; Results   <10,000 CFU/ml  Normal Urogenital atylor present; Gram Stain: --; Organism: --  Specimen Source: .Blood Blood, 12-16 @ 18:01; Results   No growth at 5 days.; Gram Stain: --; Organism: --    Meds: piperacillin/tazobactam IVPB. 3.375 Gram(s) IV Intermittent every 8 hours      ENDOCRINE  Capillary Blood Glucose    Meds: atorvastatin 80 milliGRAM(s) Oral at bedtime  insulin lispro (HumaLOG) corrective regimen sliding scale   SubCutaneous three times a day before meals  insulin lispro (HumaLOG) corrective regimen sliding scale   SubCutaneous at bedtime      OTHER MEDICATIONS:  chlorhexidine 4% Liquid 1 Application(s) Topical <User Schedule>

## 2018-12-22 NOTE — PROGRESS NOTE ADULT - SUBJECTIVE AND OBJECTIVE BOX
Surgery Progress Note    S: Patient seen and examined. No acute events overnight. Patient had central line removed yesterday. Patient tolerating low fat diet without pain, nausea, or vomiting. Patient's pain is well controlled.     O:  Vital Signs Last 24 Hrs  T(C): 36.4 (22 Dec 2018 11:00), Max: 37.4 (21 Dec 2018 15:00)  T(F): 97.5 (22 Dec 2018 11:00), Max: 99.4 (21 Dec 2018 15:00)  HR: 86 (22 Dec 2018 11:00) (79 - 91)  BP: 116/86 (22 Dec 2018 11:00) (113/65 - 141/70)  BP(mean): 97 (22 Dec 2018 11:00) (84 - 101)  RR: 28 (22 Dec 2018 11:00) (20 - 30)  SpO2: 99% (22 Dec 2018 11:00) (94% - 100%)    I&O's Detail    21 Dec 2018 07:01  -  22 Dec 2018 07:00  --------------------------------------------------------  IN:    Oral Fluid: 480 mL    Solution: 275 mL    Solution: 699.5 mL  Total IN: 1454.5 mL    OUT:    Drain: 220 mL    Indwelling Catheter - Urethral: 475 mL    Voided: 650 mL  Total OUT: 1345 mL    Total NET: 109.5 mL      22 Dec 2018 07:01  -  22 Dec 2018 11:42  --------------------------------------------------------  IN:    Oral Fluid: 250 mL    Solution: 25 mL  Total IN: 275 mL    OUT:  Total OUT: 0 mL    Total NET: 275 mL          MEDICATIONS  (STANDING):  aspirin enteric coated 81 milliGRAM(s) Oral daily  atorvastatin 80 milliGRAM(s) Oral at bedtime  chlorhexidine 4% Liquid 1 Application(s) Topical <User Schedule>  cilostazol 50 milliGRAM(s) Oral every 12 hours  clopidogrel Tablet 75 milliGRAM(s) Oral daily  docusate sodium 100 milliGRAM(s) Oral three times a day  enoxaparin Injectable 40 milliGRAM(s) SubCutaneous daily  gabapentin 100 milliGRAM(s) Oral three times a day  insulin lispro (HumaLOG) corrective regimen sliding scale   SubCutaneous three times a day before meals  insulin lispro (HumaLOG) corrective regimen sliding scale   SubCutaneous at bedtime  metoprolol tartrate 12.5 milliGRAM(s) Oral every 12 hours  mirtazapine 30 milliGRAM(s) Oral at bedtime  piperacillin/tazobactam IVPB. 3.375 Gram(s) IV Intermittent every 8 hours  senna 2 Tablet(s) Oral at bedtime    MEDICATIONS  (PRN):                            14.0   6.1   )-----------( 151      ( 22 Dec 2018 08:42 )             39.7       12-22    136  |  104  |  9   ----------------------------<  118<H>  3.8   |  18<L>  |  1.01    Ca    8.5      22 Dec 2018 08:42  Phos  3.7     12-22  Mg     2.1     12-22    TPro  6.7  /  Alb  3.0<L>  /  TBili  0.6  /  DBili  0.2  /  AST  44<H>  /  ALT  97<H>  /  AlkPhos  97  12-22      Physical Exam:  Gen: Laying in bed, NAD  Resp: Unlabored breathing  Abd: soft, NTND, RUQ drain with bilious output, no rebound or guarding  Ext: WWP  Skin: No rashes

## 2018-12-23 ENCOUNTER — TRANSCRIPTION ENCOUNTER (OUTPATIENT)
Age: 73
End: 2018-12-23

## 2018-12-23 VITALS
TEMPERATURE: 98 F | SYSTOLIC BLOOD PRESSURE: 128 MMHG | HEART RATE: 84 BPM | OXYGEN SATURATION: 95 % | RESPIRATION RATE: 18 BRPM | DIASTOLIC BLOOD PRESSURE: 80 MMHG

## 2018-12-23 LAB
ALBUMIN SERPL ELPH-MCNC: 3.5 G/DL — SIGNIFICANT CHANGE UP (ref 3.3–5)
ALP SERPL-CCNC: 108 U/L — SIGNIFICANT CHANGE UP (ref 40–120)
ALT FLD-CCNC: 91 U/L — HIGH (ref 10–45)
ANION GAP SERPL CALC-SCNC: 15 MMOL/L — SIGNIFICANT CHANGE UP (ref 5–17)
AST SERPL-CCNC: 46 U/L — HIGH (ref 10–40)
BILIRUB DIRECT SERPL-MCNC: 0.1 MG/DL — SIGNIFICANT CHANGE UP (ref 0–0.2)
BILIRUB INDIRECT FLD-MCNC: 0.5 MG/DL — SIGNIFICANT CHANGE UP (ref 0.2–1)
BILIRUB SERPL-MCNC: 0.6 MG/DL — SIGNIFICANT CHANGE UP (ref 0.2–1.2)
BUN SERPL-MCNC: 11 MG/DL — SIGNIFICANT CHANGE UP (ref 7–23)
CALCIUM SERPL-MCNC: 9.1 MG/DL — SIGNIFICANT CHANGE UP (ref 8.4–10.5)
CHLORIDE SERPL-SCNC: 106 MMOL/L — SIGNIFICANT CHANGE UP (ref 96–108)
CO2 SERPL-SCNC: 18 MMOL/L — LOW (ref 22–31)
CREAT SERPL-MCNC: 0.99 MG/DL — SIGNIFICANT CHANGE UP (ref 0.5–1.3)
GLUCOSE BLDC GLUCOMTR-MCNC: 104 MG/DL — HIGH (ref 70–99)
GLUCOSE BLDC GLUCOMTR-MCNC: 98 MG/DL — SIGNIFICANT CHANGE UP (ref 70–99)
GLUCOSE SERPL-MCNC: 118 MG/DL — HIGH (ref 70–99)
HCT VFR BLD CALC: 42.2 % — SIGNIFICANT CHANGE UP (ref 39–50)
HGB BLD-MCNC: 14.5 G/DL — SIGNIFICANT CHANGE UP (ref 13–17)
MAGNESIUM SERPL-MCNC: 2.2 MG/DL — SIGNIFICANT CHANGE UP (ref 1.6–2.6)
MCHC RBC-ENTMCNC: 29.1 PG — SIGNIFICANT CHANGE UP (ref 27–34)
MCHC RBC-ENTMCNC: 34.4 GM/DL — SIGNIFICANT CHANGE UP (ref 32–36)
MCV RBC AUTO: 84.6 FL — SIGNIFICANT CHANGE UP (ref 80–100)
PHOSPHATE SERPL-MCNC: 3.4 MG/DL — SIGNIFICANT CHANGE UP (ref 2.5–4.5)
PLATELET # BLD AUTO: 234 K/UL — SIGNIFICANT CHANGE UP (ref 150–400)
POTASSIUM SERPL-MCNC: 4.3 MMOL/L — SIGNIFICANT CHANGE UP (ref 3.5–5.3)
POTASSIUM SERPL-SCNC: 4.3 MMOL/L — SIGNIFICANT CHANGE UP (ref 3.5–5.3)
PROT SERPL-MCNC: 7.4 G/DL — SIGNIFICANT CHANGE UP (ref 6–8.3)
RBC # BLD: 4.99 M/UL — SIGNIFICANT CHANGE UP (ref 4.2–5.8)
RBC # FLD: 14.6 % — HIGH (ref 10.3–14.5)
SODIUM SERPL-SCNC: 139 MMOL/L — SIGNIFICANT CHANGE UP (ref 135–145)
WBC # BLD: 8.46 K/UL — SIGNIFICANT CHANGE UP (ref 3.8–10.5)
WBC # FLD AUTO: 8.46 K/UL — SIGNIFICANT CHANGE UP (ref 3.8–10.5)

## 2018-12-23 PROCEDURE — 93306 TTE W/DOPPLER COMPLETE: CPT

## 2018-12-23 PROCEDURE — 97162 PT EVAL MOD COMPLEX 30 MIN: CPT

## 2018-12-23 PROCEDURE — 82803 BLOOD GASES ANY COMBINATION: CPT

## 2018-12-23 PROCEDURE — 85014 HEMATOCRIT: CPT

## 2018-12-23 PROCEDURE — 80048 BASIC METABOLIC PNL TOTAL CA: CPT

## 2018-12-23 PROCEDURE — 93005 ELECTROCARDIOGRAM TRACING: CPT

## 2018-12-23 PROCEDURE — 87086 URINE CULTURE/COLONY COUNT: CPT

## 2018-12-23 PROCEDURE — 85610 PROTHROMBIN TIME: CPT

## 2018-12-23 PROCEDURE — 71045 X-RAY EXAM CHEST 1 VIEW: CPT

## 2018-12-23 PROCEDURE — 83036 HEMOGLOBIN GLYCOSYLATED A1C: CPT

## 2018-12-23 PROCEDURE — 94003 VENT MGMT INPAT SUBQ DAY: CPT

## 2018-12-23 PROCEDURE — 86900 BLOOD TYPING SEROLOGIC ABO: CPT

## 2018-12-23 PROCEDURE — 80053 COMPREHEN METABOLIC PANEL: CPT

## 2018-12-23 PROCEDURE — 82330 ASSAY OF CALCIUM: CPT

## 2018-12-23 PROCEDURE — 83735 ASSAY OF MAGNESIUM: CPT

## 2018-12-23 PROCEDURE — 74177 CT ABD & PELVIS W/CONTRAST: CPT

## 2018-12-23 PROCEDURE — 82435 ASSAY OF BLOOD CHLORIDE: CPT

## 2018-12-23 PROCEDURE — 83605 ASSAY OF LACTIC ACID: CPT

## 2018-12-23 PROCEDURE — C1894: CPT

## 2018-12-23 PROCEDURE — 86850 RBC ANTIBODY SCREEN: CPT

## 2018-12-23 PROCEDURE — 85027 COMPLETE CBC AUTOMATED: CPT

## 2018-12-23 PROCEDURE — 82962 GLUCOSE BLOOD TEST: CPT

## 2018-12-23 PROCEDURE — 96365 THER/PROPH/DIAG IV INF INIT: CPT | Mod: XU

## 2018-12-23 PROCEDURE — 84484 ASSAY OF TROPONIN QUANT: CPT

## 2018-12-23 PROCEDURE — 84145 PROCALCITONIN (PCT): CPT

## 2018-12-23 PROCEDURE — 82565 ASSAY OF CREATININE: CPT

## 2018-12-23 PROCEDURE — 76705 ECHO EXAM OF ABDOMEN: CPT

## 2018-12-23 PROCEDURE — 84132 ASSAY OF SERUM POTASSIUM: CPT

## 2018-12-23 PROCEDURE — 81001 URINALYSIS AUTO W/SCOPE: CPT

## 2018-12-23 PROCEDURE — 87040 BLOOD CULTURE FOR BACTERIA: CPT

## 2018-12-23 PROCEDURE — C1729: CPT

## 2018-12-23 PROCEDURE — C1769: CPT

## 2018-12-23 PROCEDURE — 94002 VENT MGMT INPAT INIT DAY: CPT

## 2018-12-23 PROCEDURE — 47490 INCISION OF GALLBLADDER: CPT

## 2018-12-23 PROCEDURE — 82553 CREATINE MB FRACTION: CPT

## 2018-12-23 PROCEDURE — 87070 CULTURE OTHR SPECIMN AEROBIC: CPT

## 2018-12-23 PROCEDURE — 84295 ASSAY OF SERUM SODIUM: CPT

## 2018-12-23 PROCEDURE — 87449 NOS EACH ORGANISM AG IA: CPT

## 2018-12-23 PROCEDURE — 86901 BLOOD TYPING SEROLOGIC RH(D): CPT

## 2018-12-23 PROCEDURE — 31500 INSERT EMERGENCY AIRWAY: CPT

## 2018-12-23 PROCEDURE — 80076 HEPATIC FUNCTION PANEL: CPT

## 2018-12-23 PROCEDURE — 87075 CULTR BACTERIA EXCEPT BLOOD: CPT

## 2018-12-23 PROCEDURE — 86022 PLATELET ANTIBODIES: CPT

## 2018-12-23 PROCEDURE — 82550 ASSAY OF CK (CPK): CPT

## 2018-12-23 PROCEDURE — 87324 CLOSTRIDIUM AG IA: CPT

## 2018-12-23 PROCEDURE — 85730 THROMBOPLASTIN TIME PARTIAL: CPT

## 2018-12-23 PROCEDURE — 87186 SC STD MICRODIL/AGAR DIL: CPT

## 2018-12-23 PROCEDURE — 87205 SMEAR GRAM STAIN: CPT

## 2018-12-23 PROCEDURE — 83690 ASSAY OF LIPASE: CPT

## 2018-12-23 PROCEDURE — 99231 SBSQ HOSP IP/OBS SF/LOW 25: CPT

## 2018-12-23 PROCEDURE — 99285 EMERGENCY DEPT VISIT HI MDM: CPT | Mod: 25

## 2018-12-23 PROCEDURE — 82947 ASSAY GLUCOSE BLOOD QUANT: CPT

## 2018-12-23 PROCEDURE — 84100 ASSAY OF PHOSPHORUS: CPT

## 2018-12-23 RX ADMIN — Medication 81 MILLIGRAM(S): at 11:19

## 2018-12-23 RX ADMIN — Medication 100 MILLIGRAM(S): at 05:20

## 2018-12-23 RX ADMIN — CLOPIDOGREL BISULFATE 75 MILLIGRAM(S): 75 TABLET, FILM COATED ORAL at 11:19

## 2018-12-23 RX ADMIN — Medication 12.5 MILLIGRAM(S): at 05:20

## 2018-12-23 RX ADMIN — GABAPENTIN 100 MILLIGRAM(S): 400 CAPSULE ORAL at 05:20

## 2018-12-23 RX ADMIN — CILOSTAZOL 50 MILLIGRAM(S): 100 TABLET ORAL at 05:20

## 2018-12-23 NOTE — DISCHARGE NOTE ADULT - HOSPITAL COURSE
73M hx of remote appendectomy, CABGx4 2016, HTN, DM, HLD who presented with abdominal pain since 2AM. The pain was worst in RUQ. He endorsed nausea but no vomiting. He had having chills. He had a similar episode 2 weeks ago and was worked up without avail. The morning after admission, the patient was noted to be tachypneic and with increasing pain. The patient was taken to SICU where he was intubated. The patient subsequently underwent a percutaneous cholecystostomy tube placement by IR. The patient quickly improved following the procedure. The patient was successfully extubated in SICU and was weaned off pressor requirements. The patient's diet was advanced to a low fat diet, which he tolerated well without increased pain, nausea, or vomiting. The patient completed a 5 day course of IV antibiotics. The patient was transferred to a regular surgical floor for continued care. The patient was seen and evaluated by PT who recommended home PT.     During the hospital course, patient had lab work performed on a daily basis. Prior to discharge lab work was noted to be within normal limits. A normal WBC was noted and the patient's HCT was within normal limits. Vitals were checked on a four hour basis, and the patient was noted to be normotensive and afebrile upon discharge. Patient had a normal heart rate and adequate oxygen saturations. SOB/FLORES was denied. Pain medication was given prior to discharge with no allergic reaction, and reported adequate pain control.    At the time of discharge, the patient was hemodynamically stable, was tolerating PO diet, was voiding urine and had normal GI function, was ambulating, and was comfortable with adequate pain control. The patient was instructed to follow up with Dr. Samuels within 14 days after discharge from the hospital. The patient felt comfortable with discharge. The patient was discharged with a percutaneous cholecystomy drain. The patient was instructed on how to empty the bag and record the output. The patient had no other issues.

## 2018-12-23 NOTE — DISCHARGE NOTE ADULT - CARE PROVIDERS DIRECT ADDRESSES
[email or web address], [email or web address]

## 2018-12-23 NOTE — DISCHARGE NOTE ADULT - PLAN OF CARE
To return to daily living activity prior to hospitalization. Please follow up with Dr. Samuels within 2 weeks of discharge. Please call to schedule an appointment.  Please follow up with your PCP this week.

## 2018-12-23 NOTE — DISCHARGE NOTE ADULT - INSTRUCTIONS
Low fat diet Patient/daughter advised to call MD for follow up appointment. Pt daughter taught T Tube drain care(emptying, draining and flushing) w/ return demonstration provided  by daughter. Supplies given.

## 2018-12-23 NOTE — DISCHARGE NOTE ADULT - HOME CARE AGENCY
Your case has been referred to [facility] [phone] for reinforcement of drain teaching and home physical therapy.  An RN will call you to schedule your initial visit.  Please call if you have any questions.

## 2018-12-23 NOTE — DISCHARGE NOTE ADULT - CARE PROVIDER_API CALL
Thom Samuels), Surgery; Surgical Critical Care  [address] [id number]  Phone: [phone]  Fax: [phone]

## 2018-12-23 NOTE — PROGRESS NOTE ADULT - ATTENDING COMMENTS
Dr. Saba (Attending Physician)  Precedex gtt started overnight for agitation  Acute respiratory failure - weaned PEEP and FiO2 overnight, was hypoxic after IR procedure yesterday, SBT today  Septic Shock on NE gtt at 0.2 mcg/kg/min, Vasopressin 0.03 units/hr, SVV 7, CI 2.8 on FloTrac  GNR from cholecystostomy, c/w zosyn  NSTEMI, restart aspirin, change from heparin to lovenox for dvt ppx ECG changes resolved so will not start hep gtt    This patient was critically ill with one or more vital organ systems at a high probability of imminent or life threatening deterioration. Complex decision making was required to assess and treat single or multiple vital organ system failure and/or prevent further life threatening deterioration of the patient’s condition.  All recent labwork and imaging was reviewed.  Total Critical Care Time 50
management of sepsis from acute cholecystitis  patient has huy drain  on broad spectrum antibiotics  sepsis has cleared  OK for transfer to the floor
seen and examined 12-17-18 @ 1000    transferred to SICU this morning for sepsis, quickly decompensated into acute respiratory failure requiring intubation and septic shock requiring high-dose vasopressors    abd soft / NT / ND    no gallstones on RUQ U/S and exam and imaging not convincing for acute cholecystitis. CT abd/pelvis was convincing for acute cholecystitis.    acute cholecystitis  -consult IR for percutaneous cholecystostomy tube. despite triple antiplatelet therapy for CAD s/p CABG and PVD s/p stent, benefit of urgent percutaneous cholecystostomy for source control in the setting septic shock outweighs risk of bleeding from liver hemorrhage
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
seen and examined 12-21-18 @ 1502    tolerating regular diet. 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
seen and examined 12-23-18 @ 0834    tolerating regular diet. 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
Dr. Saba (Attending Physician)  N - Agitated Delirium requiring precedex  P - acute respiratory failure with hypoxia - will give dose of lasix today, sbt this am  C - Septic Shock with likely sepsis induced cardiomyopathy, NSTEMI  GI - start tube feeds   - cristine improving, will diurese today with lasix  H - thrombocytopenia, likely sepsis induced but hit ab sent will wait for results  ID - c/w zosyn for citrobacter.  E - ssi    This patient was critically ill with one or more vital organ systems at a high probability of imminent or life threatening deterioration. Complex decision making was required to assess and treat single or multiple vital organ system failure and/or prevent further life threatening deterioration of the patient’s condition.  All recent labwork and imaging was reviewed.  Total Critical Care Time 45 min
Dr. Saba (Attending Physician)  N - Mental status improved, start oxycodone, tylenol prn for pain  C - Septic Shock weaned off NE this am, Will attempt to wean off vasopressin  P - acute resp insufficiency, APE  GI - start regular diet   - cristine improved will diurese today goal -1 to 2 liters  H - HIT negative will restart lovenox tomorrow  ID - concern for citrobacter inducing resistance will c/w zosyn  E - glucose well controlled ssi for dm    This patient was critically ill with one or more vital organ systems at a high probability of imminent or life threatening deterioration. Complex decision making was required to assess and treat single or multiple vital organ system failure and/or prevent further life threatening deterioration of the patient’s condition.  All recent labwork and imaging was reviewed.  Total Critical Care Time 40 min

## 2018-12-23 NOTE — PROGRESS NOTE ADULT - PROVIDER SPECIALTY LIST ADULT
Intervent Radiology
SICU
Trauma Surgery

## 2018-12-23 NOTE — PROGRESS NOTE ADULT - REASON FOR ADMISSION
Abd pain

## 2018-12-23 NOTE — PROGRESS NOTE ADULT - SUBJECTIVE AND OBJECTIVE BOX
Surgery Progress Note    S: Patient seen and examined. No acute events overnight. Patient was transferred out of SICU yesterday. Patient is tolerating diet without n/v. Patient's pain is well controlled.     O:  Vital Signs Last 24 Hrs  T(C): 37.3 (23 Dec 2018 05:24), Max: 37.3 (22 Dec 2018 22:11)  T(F): 99.1 (23 Dec 2018 05:24), Max: 99.1 (22 Dec 2018 22:11)  HR: 83 (23 Dec 2018 05:24) (75 - 86)  BP: 121/81 (23 Dec 2018 05:24) (116/86 - 138/95)  BP(mean): 97 (22 Dec 2018 11:00) (97 - 99)  RR: 16 (23 Dec 2018 05:24) (16 - 30)  SpO2: 93% (23 Dec 2018 05:24) (93% - 99%)    I&O's Detail    21 Dec 2018 07:01  -  22 Dec 2018 07:00  --------------------------------------------------------  IN:    Oral Fluid: 480 mL    Solution: 275 mL    Solution: 699.5 mL  Total IN: 1454.5 mL    OUT:    Drain: 220 mL    Indwelling Catheter - Urethral: 475 mL    Voided: 650 mL  Total OUT: 1345 mL    Total NET: 109.5 mL      22 Dec 2018 07:01  -  23 Dec 2018 06:35  --------------------------------------------------------  IN:    Enteral Tube Flush: 60 mL    Oral Fluid: 590 mL    Solution: 25 mL  Total IN: 675 mL    OUT:    Drain: 50 mL    Voided: 400 mL  Total OUT: 450 mL    Total NET: 225 mL          MEDICATIONS  (STANDING):  aspirin enteric coated 81 milliGRAM(s) Oral daily  atorvastatin 80 milliGRAM(s) Oral at bedtime  cilostazol 50 milliGRAM(s) Oral every 12 hours  clopidogrel Tablet 75 milliGRAM(s) Oral daily  docusate sodium 100 milliGRAM(s) Oral three times a day  enoxaparin Injectable 40 milliGRAM(s) SubCutaneous daily  gabapentin 100 milliGRAM(s) Oral three times a day  insulin lispro (HumaLOG) corrective regimen sliding scale   SubCutaneous three times a day before meals  insulin lispro (HumaLOG) corrective regimen sliding scale   SubCutaneous at bedtime  metoprolol tartrate 12.5 milliGRAM(s) Oral every 12 hours  mirtazapine 30 milliGRAM(s) Oral at bedtime  senna 2 Tablet(s) Oral at bedtime    MEDICATIONS  (PRN):                            14.2   7.0   )-----------( 192      ( 22 Dec 2018 20:06 )             41.1       12-22    139  |  102  |  11  ----------------------------<  126<H>  4.4   |  21<L>  |  1.16    Ca    9.0      22 Dec 2018 16:40  Phos  3.7     12-22  Mg     2.1     12-22    TPro  7.2  /  Alb  3.3  /  TBili  0.6  /  DBili  x   /  AST  50<H>  /  ALT  102<H>  /  AlkPhos  107  12-22      Physical Exam:  Gen: Laying in bed, NAD  Resp: Unlabored breathing  Abd: soft, NTND, RUQ with bilious output, no rebound or guarding  Ext: WWP  Skin: No rashes

## 2018-12-23 NOTE — DISCHARGE NOTE ADULT - ADDITIONAL INSTRUCTIONS
WOUND CARE: Please keep drain area clean and dry. Please do not scrub or rub near drain.  BATHING: Please do not submerge wound underwater. You may shower and/or sponge bathe.  ACTIVITY: No heavy lifting or straining until your follow up appointment. Otherwise, you may return to your usual level of physical activity. If you are taking narcotic pain medication (such as Oxycodone) DO NOT drive a car, operate machinery or make important decisions.  DIET: Low fat diet.  NOTIFY YOUR SURGEON IF: You have any bleeding that does not stop, any pus draining from your wound(s), any fever (over 100.4 F) or chills, persistent nausea/vomiting, persistent diarrhea, or if your pain is not controlled on your discharge pain medications.  FOLLOW-UP: Please follow-up with your surgeon within 1-2 weeks following discharge-please call to schedule an appointment. Please follow up with your PCP within 1 week.   Drain: You will be discharged with 1 drain. You will need to empty them and record outputs accurately. This was taught to you by the nursing staff. Please do not remove the drain. Please bring to the office accurate records of output.

## 2018-12-23 NOTE — PROGRESS NOTE ADULT - ASSESSMENT
73M w/ h/o CABG x4, CAD on ASA/plavix/pletal, HLD, HTN, h/o appendectomy presents with likely acute cholecystitis now with resp distress and rigors requiring intubation and SICU care    -pain control  -c/w abx  -wean vent as tolerated  -wean pressors as tolerated  -monitor drain output  -care per SICU    Trauma Surgery  [id number]
72 yo M with PMH of CAD s/p CABG x4 2016, LLE stent 2017, HTN, HLD, DM who presents with acute cholecystitis, acute respiratory failure, septic shock.    PLAN:   Neurologic: Acute pain control    - Dilaudid PRN for pain  - Continue Seroquil      Respiratory: acute hypoxic respiratory failure, resolved, extubated   - F/u am CXR     Cardiovascular: septic shock; CAD s/p CABG x4 with likely demand ischemia. Requiring levo and vaso for BP support. Acute cardiomyopathy   - titrate levophed and vasopressin gtts to maintain MAP >65  - Trend troponins, repeat EKG    Gastrointestinal/Nutrition: acute cholecystitis s/p percutaneous cholecystostomy tube placement.  - Consider advancing diet       Genitourinary/Renal: VIVIENNE stage 1  - Velasco, strict I&Os  - IV locked     Hematologic: no acute issues  - SQH for VTE prophylaxis    Infectious Disease: acute cholecystitis s/p PCT, septic shock; C. diff negative.  - Continue Zosyn  - Follow up blood cultures    Endocrine: DM  - ISS q6    Disposition: SICU. Full code.
72 yo M with PMH of CAD s/p CABG x4 2016, LLE stent 2017, HTN, HLD, DM who presents with acute cholecystitis, acute respiratory failure, septic shock. Now s/p percutaneous cholecystostomy, extubated, off pressor support, tolerating regular diet.    PLAN:   Neurologic: Acute pain control. Agitated overnight, received haldol.  - Dilaudid PRN for pain  - Continue Remeron    Respiratory: acute hypoxic respiratory failure, resolved, extubated.  - Monitor oxygen saturation    Cardiovascular: septic shock; CAD s/p CABG x4 with likely demand ischemia. Weaned off vasopressor support. Acute cardiomyopathy.  -Continue home antiplatelets and antihypertensives    Gastrointestinal/Nutrition: acute cholecystitis s/p percutaneous cholecystostomy tube placement.  - Regular diet    Genitourinary/Renal: VIVIENNE stage 1.  - Velasco removed  - Monitor urine output    Hematologic: no acute issues. HIT antibodies negative.  - Lovenox for VTE prophylaxis    Infectious Disease: acute cholecystitis s/p PCT, septic shock; C. diff negative.  - Continue Zosyn  - Follow up blood cultures    Endocrine: DM  - ISS q6    Disposition: Full code. Stable for transfer to floor.
73M w/ h/o CABG x4, CAD on ASA/plavix/pletal, HLD, HTN, h/o appendectomy presents with likely acute cholecystitis c/b respiratory distress requiring intubation and hypotension requiring pressor support s/p perc huy 12/17, now weaned off pressors/extubated, pain improved and tolerating diet.     -DC odom  -Repeat TTE today   -pain control as needed  -c/w IV abx  -monitor drain output, flush QD  -care per SICU appreciated     Trauma Surgery  [id number]
73M w/ h/o CABG x4, CAD on ASA/plavix/pletal, HLD, HTN, h/o appendectomy presents with likely acute cholecystitis c/b respiratory distress requiring intubation and hypotension requiring pressor support s/p perc huy 12/17, now weaned off pressors/extubated, pain improved and tolerating diet.     -pain control as needed  -c/w IV abx  -c/w low fat diet  -lovenox for DVT ppx  -monitor drain output, flush QD  -care per SICU appreciated     Trauma Surgery  [id number]
73M w/ h/o CABG x4, CAD on ASA/plavix/pletal, HLD, HTN, h/o appendectomy presents with likely acute cholecystitis c/b respiratory distress requiring intubation and hypotension requiring pressor support s/p perc huy 12/17, now weaned off pressors/extubated, pain improved and tolerating diet.     -pain control as needed  -c/w IV abx  -c/w low fat diet  -lovenox for DVT ppx  -monitor drain output, flush QD  -poss d/c today if home PT and VNS is set up    Trauma Surgery  [id number]
73M w/ h/o CABG x4, CAD on ASA/plavix/pletal, HLD, HTN, h/o appendectomy presents with likely acute cholecystitis now with resp distress and rigors requiring intubation and SICU care    - f/u antiplatelet plan with patient's cardiologist Dr. Pedro Goldsmith  -pain control as needed  -c/w abx  -f/u CT abd/pelv  -c. diff is -  -care per SICU    Trauma Surgery  [id number]
73M w/ h/o CABG x4, CAD on ASA/plavix/pletal, HLD, HTN, h/o appendectomy presents with likely acute cholecystitis now with resp distress and rigors requiring intubation and SICU care    -pain control  -c/w abx  -wean vent as tolerated  -wean pressors as tolerated  -monitor drain output, flush QD  - F/U IR  -care per SICU    Trauma Surgery  [id number]
73M w/ h/o CABG x4, CAD on ASA/plavix/pletal, HLD, HTN, h/o appendectomy presents with likely acute cholecystitis now with resp distress and rigors requiring intubation and SICU care, now extubated and on minimal pressor requirements    -pain control as needed  -c/w IV abx  -wean pressors as tolerated  -monitor drain output, flush QD  -F/U IR  -care per SICU    Trauma Surgery  [id number]
74 yo M with PMH of CAD s/p CABG x4 2016, LLE stent 2017, HTN, HLD, DM who presents with acute cholecystitis, acute respiratory failure, septic shock.    PLAN:   Neurologic: AMS likely 2/2 septic metabolic encephalopathy. Hypotensive on precedex.  - Ativan and Dilaudid PRN for agitation and pain, respectively    Respiratory: acute hypoxic respiratory failure  - Volume /12/5/50%   - Wean FIO2 as tolerated    Cardiovascular: septic shock; CAD s/p CABG x4 with likely demand ischemia. Requiring levo and vaso for BP support.  - titrate levophed and vasopressin gtts to maintain MAP >65  - Jamey Trac, CVP  - Trend troponins, repeat EKG    Gastrointestinal/Nutrition: acute cholecystitis s/p percutaneous cholecystostomy tube placement.  - NPO  - Protonix for stress ulcer prophylaxis    Genitourinary/Renal: VIVIENNE stage 1  - Rod strict I&Os  - LR @ 125    Hematologic: no acute issues  - SQH for VTE prophylaxis    Infectious Disease: acute cholecystitis s/p PCT, septic shock; C. diff negative.  - Continue Zosyn  - Follow up blood cultures    Endocrine: DM  - ISS q6    Disposition: SICU. Full code.
74 yo M with PMH of CAD s/p CABG x4 2016, LLE stent 2017, HTN, HLD, DM who presents with acute cholecystitis, acute respiratory failure, septic shock.    PLAN:   Neurologic: AMS likely 2/2 septic metabolic encephalopathy. Hypotensive on precedex.  - Precedex for agitation   - Dilaudid PRN for pain  - Continue Seroquill to wean off precedex       Respiratory: acute hypoxic respiratory failure  - Volume /12/5/30%   - Wean FIO2 as tolerated  - SBT in am     Cardiovascular: septic shock; CAD s/p CABG x4 with likely demand ischemia. Requiring levo and vaso for BP support.  - titrate levophed and vasopressin gtts to maintain MAP >65  - Jamey Trac, CVP  - Trend troponins, repeat EKG    Gastrointestinal/Nutrition: acute cholecystitis s/p percutaneous cholecystostomy tube placement.  - NPO  - Protonix for stress ulcer prophylaxis    Genitourinary/Renal: VIVIENNE stage 1  - Velasco, strict I&Os  - Normosol @ 50ml/hr     Hematologic: no acute issues  - SQH for VTE prophylaxis    Infectious Disease: acute cholecystitis s/p PCT, septic shock; C. diff negative.  - Continue Zosyn  - Follow up blood cultures    Endocrine: DM  - ISS q6    Disposition: SICU. Full code.
74 yo M with PMH of CAD s/p CABG x4 2016, LLE stent 2017, HTN, HLD, DM who presents with acute cholecystitis, acute respiratory failure, septic shock.    PLAN:   Neurologic: Acute pain control. Agitated overnight, received haldol.   - Dilaudid PRN for pain  - Continue Seroquel    Respiratory: acute hypoxic respiratory failure, resolved, extubated.  - F/u am CXR     Cardiovascular: septic shock; CAD s/p CABG x4 with likely demand ischemia. Weaned off vasopressor support. Acute cardiomyopathy.  -follow up cardiology plans for plavix  -ASA 81 mg daily  -repeat TTE to follow acute cardiomyopathy    Gastrointestinal/Nutrition: acute cholecystitis s/p percutaneous cholecystostomy tube placement.  - Consider advancing diet     Genitourinary/Renal: VIVIENNE stage 1.  - Velasco, strict I&Os  - IV locked     Hematologic: no acute issues. HIT antibodies negative.  - Lovenox for VTE prophylaxis    Infectious Disease: acute cholecystitis s/p PCT, septic shock; C. diff negative.  - Continue Zosyn  - Follow up blood cultures    Endocrine: DM  - ISS q6    Disposition: SICU. Full code.

## 2018-12-23 NOTE — DISCHARGE NOTE ADULT - CARE PLAN
Principal Discharge DX:	Cholecystitis  Goal:	To return to daily living activity prior to hospitalization.  Assessment and plan of treatment:	Please follow up with Dr. Samuels within 2 weeks of discharge. Please call to schedule an appointment.  Please follow up with your PCP this week.

## 2018-12-23 NOTE — DISCHARGE NOTE ADULT - MEDICATION SUMMARY - MEDICATIONS TO TAKE
I will START or STAY ON the medications listed below when I get home from the hospital:    aspirin 81 mg oral tablet  -- 1 tab(s) by mouth once a day  -- Indication: For Home med    losartan 25 mg oral tablet  -- 1 tab(s) by mouth once a day  -- Indication: For Home med    metFORMIN 500 mg oral tablet  -- 1 tab(s) by mouth 2 times a day  -- Indication: For Home med    atorvastatin 80 mg oral tablet  -- 1 tab(s) by mouth once a day  -- Indication: For Home med    Plavix 75 mg oral tablet  -- 1 tab(s) by mouth once a day  -- Indication: For Home med    metoprolol succinate 25 mg oral tablet, extended release  -- 1 tab(s) by mouth once a day  -- Indication: For Home med    cilostazol 50 mg oral tablet  -- 1 tab(s) by mouth 2 times a day  -- Indication: For Home med

## 2018-12-23 NOTE — DISCHARGE NOTE ADULT - PATIENT PORTAL LINK FT
You can access the [facility] Patient Portal, offered by [facility], by registering with the following website: [email or web address]

## 2019-01-02 PROBLEM — E78.5 HYPERLIPIDEMIA, UNSPECIFIED: Chronic | Status: ACTIVE | Noted: 2018-12-16

## 2019-01-02 PROBLEM — E11.9 TYPE 2 DIABETES MELLITUS WITHOUT COMPLICATIONS: Chronic | Status: ACTIVE | Noted: 2018-12-16

## 2019-01-02 PROBLEM — I25.10 ATHEROSCLEROTIC HEART DISEASE OF NATIVE CORONARY ARTERY WITHOUT ANGINA PECTORIS: Chronic | Status: ACTIVE | Noted: 2018-12-16

## 2019-01-02 PROBLEM — I10 ESSENTIAL (PRIMARY) HYPERTENSION: Chronic | Status: ACTIVE | Noted: 2018-12-16

## 2019-01-02 PROBLEM — Z00.00 ENCOUNTER FOR PREVENTIVE HEALTH EXAMINATION: Status: ACTIVE | Noted: 2019-01-02

## 2019-01-07 ENCOUNTER — INPATIENT (INPATIENT)
Facility: HOSPITAL | Age: 74
LOS: 1 days | Discharge: HOME | DRG: 983 | End: 2019-01-09
Attending: SURGERY | Admitting: HOSPITALIST
Payer: MEDICARE

## 2019-01-07 ENCOUNTER — APPOINTMENT (OUTPATIENT)
Dept: TRAUMA SURGERY | Facility: CLINIC | Age: 74
End: 2019-01-07
Payer: MEDICARE

## 2019-01-07 ENCOUNTER — OUTPATIENT (OUTPATIENT)
Dept: OUTPATIENT SERVICES | Facility: HOSPITAL | Age: 74
LOS: 1 days | End: 2019-01-07
Payer: MEDICARE

## 2019-01-07 VITALS
OXYGEN SATURATION: 99 % | DIASTOLIC BLOOD PRESSURE: 91 MMHG | HEART RATE: 88 BPM | SYSTOLIC BLOOD PRESSURE: 150 MMHG | WEIGHT: 169.98 LBS | HEIGHT: 68 IN | RESPIRATION RATE: 18 BRPM

## 2019-01-07 VITALS
SYSTOLIC BLOOD PRESSURE: 121 MMHG | HEART RATE: 79 BPM | DIASTOLIC BLOOD PRESSURE: 83 MMHG | BODY MASS INDEX: 22.9 KG/M2 | WEIGHT: 160 LBS | TEMPERATURE: 98 F | HEIGHT: 70 IN

## 2019-01-07 DIAGNOSIS — K81.1 CHRONIC CHOLECYSTITIS: ICD-10-CM

## 2019-01-07 DIAGNOSIS — Z95.1 PRESENCE OF AORTOCORONARY BYPASS GRAFT: Chronic | ICD-10-CM

## 2019-01-07 DIAGNOSIS — Z90.49 ACQUIRED ABSENCE OF OTHER SPECIFIED PARTS OF DIGESTIVE TRACT: Chronic | ICD-10-CM

## 2019-01-07 DIAGNOSIS — R10.11 RIGHT UPPER QUADRANT PAIN: ICD-10-CM

## 2019-01-07 DIAGNOSIS — K81.9 CHOLECYSTITIS, UNSPECIFIED: ICD-10-CM

## 2019-01-07 LAB
ALBUMIN SERPL ELPH-MCNC: 4.2 G/DL — SIGNIFICANT CHANGE UP (ref 3.3–5)
ALP SERPL-CCNC: 112 U/L — SIGNIFICANT CHANGE UP (ref 40–120)
ALT FLD-CCNC: 34 U/L — SIGNIFICANT CHANGE UP (ref 10–45)
ANION GAP SERPL CALC-SCNC: 17 MMOL/L — SIGNIFICANT CHANGE UP (ref 5–17)
APTT BLD: 27.4 SEC — LOW (ref 27.5–36.3)
AST SERPL-CCNC: 24 U/L — SIGNIFICANT CHANGE UP (ref 10–40)
BASOPHILS # BLD AUTO: 0 K/UL — SIGNIFICANT CHANGE UP (ref 0–0.2)
BASOPHILS NFR BLD AUTO: 0.3 % — SIGNIFICANT CHANGE UP (ref 0–2)
BILIRUB SERPL-MCNC: 0.6 MG/DL — SIGNIFICANT CHANGE UP (ref 0.2–1.2)
BLD GP AB SCN SERPL QL: NEGATIVE — SIGNIFICANT CHANGE UP
BUN SERPL-MCNC: 12 MG/DL — SIGNIFICANT CHANGE UP (ref 7–23)
CALCIUM SERPL-MCNC: 9.5 MG/DL — SIGNIFICANT CHANGE UP (ref 8.4–10.5)
CHLORIDE SERPL-SCNC: 99 MMOL/L — SIGNIFICANT CHANGE UP (ref 96–108)
CO2 SERPL-SCNC: 22 MMOL/L — SIGNIFICANT CHANGE UP (ref 22–31)
CREAT SERPL-MCNC: 1.25 MG/DL — SIGNIFICANT CHANGE UP (ref 0.5–1.3)
EOSINOPHIL # BLD AUTO: 0.1 K/UL — SIGNIFICANT CHANGE UP (ref 0–0.5)
EOSINOPHIL NFR BLD AUTO: 0.7 % — SIGNIFICANT CHANGE UP (ref 0–6)
GLUCOSE SERPL-MCNC: 121 MG/DL — HIGH (ref 70–99)
HCT VFR BLD CALC: 43.5 % — SIGNIFICANT CHANGE UP (ref 39–50)
HGB BLD-MCNC: 15 G/DL — SIGNIFICANT CHANGE UP (ref 13–17)
INR BLD: 1.13 RATIO — SIGNIFICANT CHANGE UP (ref 0.88–1.16)
LACTATE BLDV-MCNC: 1.9 MMOL/L — SIGNIFICANT CHANGE UP (ref 0.7–2)
LACTATE BLDV-MCNC: 2.4 MMOL/L — HIGH (ref 0.7–2)
LYMPHOCYTES # BLD AUTO: 1.5 K/UL — SIGNIFICANT CHANGE UP (ref 1–3.3)
LYMPHOCYTES # BLD AUTO: 14.6 % — SIGNIFICANT CHANGE UP (ref 13–44)
MCHC RBC-ENTMCNC: 30.2 PG — SIGNIFICANT CHANGE UP (ref 27–34)
MCHC RBC-ENTMCNC: 34.4 GM/DL — SIGNIFICANT CHANGE UP (ref 32–36)
MCV RBC AUTO: 87.8 FL — SIGNIFICANT CHANGE UP (ref 80–100)
MONOCYTES # BLD AUTO: 0.7 K/UL — SIGNIFICANT CHANGE UP (ref 0–0.9)
MONOCYTES NFR BLD AUTO: 6.5 % — SIGNIFICANT CHANGE UP (ref 2–14)
NEUTROPHILS # BLD AUTO: 7.9 K/UL — HIGH (ref 1.8–7.4)
NEUTROPHILS NFR BLD AUTO: 77.8 % — HIGH (ref 43–77)
PLATELET # BLD AUTO: 209 K/UL — SIGNIFICANT CHANGE UP (ref 150–400)
POTASSIUM SERPL-MCNC: 4 MMOL/L — SIGNIFICANT CHANGE UP (ref 3.5–5.3)
POTASSIUM SERPL-SCNC: 4 MMOL/L — SIGNIFICANT CHANGE UP (ref 3.5–5.3)
PROT SERPL-MCNC: 7.6 G/DL — SIGNIFICANT CHANGE UP (ref 6–8.3)
PROTHROM AB SERPL-ACNC: 12.9 SEC — SIGNIFICANT CHANGE UP (ref 10–12.9)
RBC # BLD: 4.96 M/UL — SIGNIFICANT CHANGE UP (ref 4.2–5.8)
RBC # FLD: 13.5 % — SIGNIFICANT CHANGE UP (ref 10.3–14.5)
RH IG SCN BLD-IMP: POSITIVE — SIGNIFICANT CHANGE UP
SODIUM SERPL-SCNC: 138 MMOL/L — SIGNIFICANT CHANGE UP (ref 135–145)
WBC # BLD: 10.2 K/UL — SIGNIFICANT CHANGE UP (ref 3.8–10.5)
WBC # FLD AUTO: 10.2 K/UL — SIGNIFICANT CHANGE UP (ref 3.8–10.5)

## 2019-01-07 PROCEDURE — 47531 INJECTION FOR CHOLANGIOGRAM: CPT

## 2019-01-07 PROCEDURE — 74177 CT ABD & PELVIS W/CONTRAST: CPT | Mod: 26

## 2019-01-07 PROCEDURE — 71045 X-RAY EXAM CHEST 1 VIEW: CPT | Mod: 26

## 2019-01-07 PROCEDURE — 93010 ELECTROCARDIOGRAM REPORT: CPT

## 2019-01-07 PROCEDURE — 99213 OFFICE O/P EST LOW 20 MIN: CPT | Mod: PD

## 2019-01-07 PROCEDURE — 99285 EMERGENCY DEPT VISIT HI MDM: CPT | Mod: GC,25

## 2019-01-07 RX ORDER — PIPERACILLIN AND TAZOBACTAM 4; .5 G/20ML; G/20ML
3.38 INJECTION, POWDER, LYOPHILIZED, FOR SOLUTION INTRAVENOUS ONCE
Qty: 0 | Refills: 0 | Status: COMPLETED | OUTPATIENT
Start: 2019-01-07 | End: 2019-01-07

## 2019-01-07 RX ORDER — SODIUM CHLORIDE 9 MG/ML
2500 INJECTION INTRAMUSCULAR; INTRAVENOUS; SUBCUTANEOUS ONCE
Qty: 0 | Refills: 0 | Status: COMPLETED | OUTPATIENT
Start: 2019-01-07 | End: 2019-01-07

## 2019-01-07 RX ORDER — ACETAMINOPHEN 500 MG
1000 TABLET ORAL ONCE
Qty: 0 | Refills: 0 | Status: COMPLETED | OUTPATIENT
Start: 2019-01-07 | End: 2019-01-07

## 2019-01-07 RX ORDER — MORPHINE SULFATE 50 MG/1
2 CAPSULE, EXTENDED RELEASE ORAL ONCE
Qty: 0 | Refills: 0 | Status: DISCONTINUED | OUTPATIENT
Start: 2019-01-07 | End: 2019-01-07

## 2019-01-07 RX ADMIN — PIPERACILLIN AND TAZOBACTAM 200 GRAM(S): 4; .5 INJECTION, POWDER, LYOPHILIZED, FOR SOLUTION INTRAVENOUS at 19:50

## 2019-01-07 RX ADMIN — MORPHINE SULFATE 2 MILLIGRAM(S): 50 CAPSULE, EXTENDED RELEASE ORAL at 21:48

## 2019-01-07 RX ADMIN — Medication 400 MILLIGRAM(S): at 19:53

## 2019-01-07 RX ADMIN — SODIUM CHLORIDE 5000 MILLILITER(S): 9 INJECTION INTRAMUSCULAR; INTRAVENOUS; SUBCUTANEOUS at 19:45

## 2019-01-07 RX ADMIN — Medication 1000 MILLIGRAM(S): at 20:23

## 2019-01-07 RX ADMIN — MORPHINE SULFATE 2 MILLIGRAM(S): 50 CAPSULE, EXTENDED RELEASE ORAL at 22:18

## 2019-01-07 NOTE — PHYSICAL EXAM
[de-identified] : well appearing male in no acute distress [de-identified] : nromocephalic, no scleral icterus [de-identified] : soft nondistended, discomfort on RUQ to palpation, no rebound or guarding. Drain with no output noted.

## 2019-01-07 NOTE — ED PROVIDER NOTE - PROGRESS NOTE DETAILS
jacqueline medel (IR). surg onboard. will ct. Dr. Ojeda discussed with surgery consult resident. Surgery recommending admission to medicine.

## 2019-01-07 NOTE — ED PROVIDER NOTE - SKIN NEGATIVE STATEMENT, MLM
no abrasions, no jaundice, no lesions, no pruritis, and no rashes. tube site with scant dried blood on dressing

## 2019-01-07 NOTE — ED PROVIDER NOTE - OBJECTIVE STATEMENT
Pt is a 74yo [place]-speaking M with PMH appendectomy, CAB x 4 2016, PVD s/p LLE stent 1 yr ago, HTN, DM2, HLD, recent admission with SICU stay 12/16-23 s/p IR perc huy tube presenting after IR tube check earlier today s/p perc huy tube removal after found to be malpositioned. Tube check was performed by Dr. Berry. On my arrival the patient is rigoring, hemodynamically stable, mentating. Blood cultures x 2 already collected. Other symptoms include abdominal pain this afternoon starting around 3 pm (before his tube check with IR). At home, perc huy tube was noted to not be draining for about the past 48 hours, prompting IR procedure today.     T 100.8degF rectally, HR 90 at time of my exam.

## 2019-01-07 NOTE — ED ADULT NURSE NOTE - OBJECTIVE STATEMENT
72 yo presents to the ED from IR. pt primarily Yemeni speaking, family at bedside translating for pt. pt and family declined translation services. pt A&Ox4, c/o abd pain. pt was recently DC from [facility] after extensive hospital stay requiring SICU admission for acute cholecystitis. percutaneous cholecystostomy tube placed by IR during admission. DC on 12/23 and was tolerating well, good drain output. noticed this afternoon at 1500 abd pain. at home noticed huy tube was not draining for 48 hours. was in IR today to evaluate the patency of drain and during procedure drain "fell out". arrives with gauze mildly saturated with blood in RUQ.18G RAC. 20G R forearm.

## 2019-01-07 NOTE — ED PROVIDER NOTE - ATTENDING CONTRIBUTION TO CARE
perc huy tube to be removed found to be misplaced, ir thinks poss contrast into hugo-cholic gutter. dr medel unable to deterimine if pt w acute huy due to collapsed gb.  shaking chills, ruq ttp no rebound or guarding. gauze over wound where huy drain was in place.  ctap, persume sepsis, fluids, cx, abx.

## 2019-01-07 NOTE — HISTORY OF PRESENT ILLNESS
[FreeTextEntry1] : 73 year old male with acute cholecystitis on plavix and cilastazol who is s/p perc cholecystectomy on Dec 17 now presents for follow up in the office. His daughter states that the drain has not put anythign out for 48 hours and he is having discomfort. The day before this he was putting out 60 cc. He is tolerating diet but is not eating much and feels fatigued. i spoke with IR and they will be able to do a drain check today.

## 2019-01-08 DIAGNOSIS — I73.9 PERIPHERAL VASCULAR DISEASE, UNSPECIFIED: ICD-10-CM

## 2019-01-08 DIAGNOSIS — R79.89 OTHER SPECIFIED ABNORMAL FINDINGS OF BLOOD CHEMISTRY: ICD-10-CM

## 2019-01-08 DIAGNOSIS — T85.518A BREAKDOWN (MECHANICAL) OF OTHER GASTROINTESTINAL PROSTHETIC DEVICES, IMPLANTS AND GRAFTS, INITIAL ENCOUNTER: ICD-10-CM

## 2019-01-08 DIAGNOSIS — I25.10 ATHEROSCLEROTIC HEART DISEASE OF NATIVE CORONARY ARTERY WITHOUT ANGINA PECTORIS: ICD-10-CM

## 2019-01-08 DIAGNOSIS — Z29.9 ENCOUNTER FOR PROPHYLACTIC MEASURES, UNSPECIFIED: ICD-10-CM

## 2019-01-08 LAB
ALBUMIN SERPL ELPH-MCNC: 3.2 G/DL — LOW (ref 3.3–5)
ALP SERPL-CCNC: 92 U/L — SIGNIFICANT CHANGE UP (ref 40–120)
ALT FLD-CCNC: 23 U/L — SIGNIFICANT CHANGE UP (ref 10–45)
ANION GAP SERPL CALC-SCNC: 8 MMOL/L — SIGNIFICANT CHANGE UP (ref 5–17)
APPEARANCE UR: CLEAR — SIGNIFICANT CHANGE UP
APTT BLD: 29.7 SEC — SIGNIFICANT CHANGE UP (ref 27.5–36.3)
AST SERPL-CCNC: 20 U/L — SIGNIFICANT CHANGE UP (ref 10–40)
BILIRUB DIRECT SERPL-MCNC: 0.1 MG/DL — SIGNIFICANT CHANGE UP (ref 0–0.2)
BILIRUB INDIRECT FLD-MCNC: 0.4 MG/DL — SIGNIFICANT CHANGE UP (ref 0.2–1)
BILIRUB SERPL-MCNC: 0.5 MG/DL — SIGNIFICANT CHANGE UP (ref 0.2–1.2)
BILIRUB UR-MCNC: NEGATIVE — SIGNIFICANT CHANGE UP
BUN SERPL-MCNC: 8 MG/DL — SIGNIFICANT CHANGE UP (ref 7–23)
CALCIUM SERPL-MCNC: 8.8 MG/DL — SIGNIFICANT CHANGE UP (ref 8.4–10.5)
CHLORIDE SERPL-SCNC: 109 MMOL/L — HIGH (ref 96–108)
CO2 SERPL-SCNC: 23 MMOL/L — SIGNIFICANT CHANGE UP (ref 22–31)
COLOR SPEC: SIGNIFICANT CHANGE UP
CREAT SERPL-MCNC: 1.25 MG/DL — SIGNIFICANT CHANGE UP (ref 0.5–1.3)
DIFF PNL FLD: NEGATIVE — SIGNIFICANT CHANGE UP
GLUCOSE BLDC GLUCOMTR-MCNC: 95 MG/DL — SIGNIFICANT CHANGE UP (ref 70–99)
GLUCOSE SERPL-MCNC: 112 MG/DL — HIGH (ref 70–99)
GLUCOSE UR QL: NEGATIVE — SIGNIFICANT CHANGE UP
HCT VFR BLD CALC: 40.2 % — SIGNIFICANT CHANGE UP (ref 39–50)
HGB BLD-MCNC: 13.8 G/DL — SIGNIFICANT CHANGE UP (ref 13–17)
INR BLD: 1.18 RATIO — HIGH (ref 0.88–1.16)
KETONES UR-MCNC: NEGATIVE — SIGNIFICANT CHANGE UP
LEUKOCYTE ESTERASE UR-ACNC: NEGATIVE — SIGNIFICANT CHANGE UP
MCHC RBC-ENTMCNC: 30.4 PG — SIGNIFICANT CHANGE UP (ref 27–34)
MCHC RBC-ENTMCNC: 34.3 GM/DL — SIGNIFICANT CHANGE UP (ref 32–36)
MCV RBC AUTO: 88.7 FL — SIGNIFICANT CHANGE UP (ref 80–100)
NITRITE UR-MCNC: NEGATIVE — SIGNIFICANT CHANGE UP
PH UR: 6 — SIGNIFICANT CHANGE UP (ref 5–8)
PLATELET # BLD AUTO: 162 K/UL — SIGNIFICANT CHANGE UP (ref 150–400)
POTASSIUM SERPL-MCNC: 5.4 MMOL/L — HIGH (ref 3.5–5.3)
POTASSIUM SERPL-SCNC: 5.4 MMOL/L — HIGH (ref 3.5–5.3)
PROT SERPL-MCNC: 6.4 G/DL — SIGNIFICANT CHANGE UP (ref 6–8.3)
PROT UR-MCNC: ABNORMAL
PROTHROM AB SERPL-ACNC: 13.6 SEC — HIGH (ref 10–12.9)
RBC # BLD: 4.54 M/UL — SIGNIFICANT CHANGE UP (ref 4.2–5.8)
RBC # FLD: 13.5 % — SIGNIFICANT CHANGE UP (ref 10.3–14.5)
SODIUM SERPL-SCNC: 140 MMOL/L — SIGNIFICANT CHANGE UP (ref 135–145)
SP GR SPEC: >1.05 (ref 1.01–1.02)
UROBILINOGEN FLD QL: NEGATIVE — SIGNIFICANT CHANGE UP
WBC # BLD: 8.8 K/UL — SIGNIFICANT CHANGE UP (ref 3.8–10.5)
WBC # FLD AUTO: 8.8 K/UL — SIGNIFICANT CHANGE UP (ref 3.8–10.5)

## 2019-01-08 PROCEDURE — 99223 1ST HOSP IP/OBS HIGH 75: CPT

## 2019-01-08 PROCEDURE — 99231 SBSQ HOSP IP/OBS SF/LOW 25: CPT

## 2019-01-08 PROCEDURE — 78226 HEPATOBILIARY SYSTEM IMAGING: CPT | Mod: 26

## 2019-01-08 PROCEDURE — 99221 1ST HOSP IP/OBS SF/LOW 40: CPT

## 2019-01-08 RX ORDER — SODIUM CHLORIDE 9 MG/ML
1000 INJECTION INTRAMUSCULAR; INTRAVENOUS; SUBCUTANEOUS
Qty: 0 | Refills: 0 | Status: DISCONTINUED | OUTPATIENT
Start: 2019-01-08 | End: 2019-01-08

## 2019-01-08 RX ORDER — ATORVASTATIN CALCIUM 80 MG/1
1 TABLET, FILM COATED ORAL
Qty: 0 | Refills: 0 | COMMUNITY

## 2019-01-08 RX ORDER — CILOSTAZOL 100 MG/1
100 TABLET ORAL
Qty: 0 | Refills: 0 | Status: DISCONTINUED | OUTPATIENT
Start: 2019-01-08 | End: 2019-01-08

## 2019-01-08 RX ORDER — CILOSTAZOL 100 MG/1
1 TABLET ORAL
Qty: 0 | Refills: 0 | COMMUNITY

## 2019-01-08 RX ORDER — ACETAMINOPHEN 500 MG
650 TABLET ORAL ONCE
Qty: 0 | Refills: 0 | Status: COMPLETED | OUTPATIENT
Start: 2019-01-08 | End: 2019-01-08

## 2019-01-08 RX ORDER — METOPROLOL TARTRATE 50 MG
25 TABLET ORAL DAILY
Qty: 0 | Refills: 0 | Status: DISCONTINUED | OUTPATIENT
Start: 2019-01-08 | End: 2019-01-09

## 2019-01-08 RX ORDER — LOSARTAN POTASSIUM 100 MG/1
25 TABLET, FILM COATED ORAL DAILY
Qty: 0 | Refills: 0 | Status: DISCONTINUED | OUTPATIENT
Start: 2019-01-08 | End: 2019-01-09

## 2019-01-08 RX ORDER — LOSARTAN POTASSIUM 100 MG/1
25 TABLET, FILM COATED ORAL DAILY
Qty: 0 | Refills: 0 | Status: DISCONTINUED | OUTPATIENT
Start: 2019-01-08 | End: 2019-01-08

## 2019-01-08 RX ORDER — METFORMIN HYDROCHLORIDE 850 MG/1
1 TABLET ORAL
Qty: 0 | Refills: 0 | COMMUNITY

## 2019-01-08 RX ORDER — PIPERACILLIN AND TAZOBACTAM 4; .5 G/20ML; G/20ML
3.38 INJECTION, POWDER, LYOPHILIZED, FOR SOLUTION INTRAVENOUS EVERY 8 HOURS
Qty: 0 | Refills: 0 | Status: DISCONTINUED | OUTPATIENT
Start: 2019-01-08 | End: 2019-01-09

## 2019-01-08 RX ORDER — LOSARTAN POTASSIUM 100 MG/1
1 TABLET, FILM COATED ORAL
Qty: 0 | Refills: 0 | COMMUNITY

## 2019-01-08 RX ORDER — ENOXAPARIN SODIUM 100 MG/ML
40 INJECTION SUBCUTANEOUS DAILY
Qty: 0 | Refills: 0 | Status: DISCONTINUED | OUTPATIENT
Start: 2019-01-08 | End: 2019-01-09

## 2019-01-08 RX ORDER — CLOPIDOGREL BISULFATE 75 MG/1
75 TABLET, FILM COATED ORAL DAILY
Qty: 0 | Refills: 0 | Status: DISCONTINUED | OUTPATIENT
Start: 2019-01-08 | End: 2019-01-08

## 2019-01-08 RX ORDER — SODIUM CHLORIDE 9 MG/ML
1000 INJECTION, SOLUTION INTRAVENOUS
Qty: 0 | Refills: 0 | Status: DISCONTINUED | OUTPATIENT
Start: 2019-01-08 | End: 2019-01-08

## 2019-01-08 RX ORDER — ATORVASTATIN CALCIUM 80 MG/1
80 TABLET, FILM COATED ORAL AT BEDTIME
Qty: 0 | Refills: 0 | Status: DISCONTINUED | OUTPATIENT
Start: 2019-01-08 | End: 2019-01-09

## 2019-01-08 RX ORDER — CEFTRIAXONE 500 MG/1
2 INJECTION, POWDER, FOR SOLUTION INTRAMUSCULAR; INTRAVENOUS ONCE
Qty: 0 | Refills: 0 | Status: COMPLETED | OUTPATIENT
Start: 2019-01-08 | End: 2019-01-08

## 2019-01-08 RX ORDER — ACETAMINOPHEN 500 MG
650 TABLET ORAL EVERY 6 HOURS
Qty: 0 | Refills: 0 | Status: DISCONTINUED | OUTPATIENT
Start: 2019-01-08 | End: 2019-01-08

## 2019-01-08 RX ORDER — METOPROLOL TARTRATE 50 MG
25 TABLET ORAL DAILY
Qty: 0 | Refills: 0 | Status: DISCONTINUED | OUTPATIENT
Start: 2019-01-08 | End: 2019-01-08

## 2019-01-08 RX ORDER — CLOPIDOGREL BISULFATE 75 MG/1
1 TABLET, FILM COATED ORAL
Qty: 0 | Refills: 0 | COMMUNITY

## 2019-01-08 RX ORDER — ASPIRIN/CALCIUM CARB/MAGNESIUM 324 MG
81 TABLET ORAL DAILY
Qty: 0 | Refills: 0 | Status: DISCONTINUED | OUTPATIENT
Start: 2019-01-08 | End: 2019-01-09

## 2019-01-08 RX ORDER — ASPIRIN/CALCIUM CARB/MAGNESIUM 324 MG
1 TABLET ORAL
Qty: 0 | Refills: 0 | COMMUNITY

## 2019-01-08 RX ORDER — METOPROLOL TARTRATE 50 MG
1 TABLET ORAL
Qty: 0 | Refills: 0 | COMMUNITY

## 2019-01-08 RX ORDER — CEFTRIAXONE 500 MG/1
INJECTION, POWDER, FOR SOLUTION INTRAMUSCULAR; INTRAVENOUS
Qty: 0 | Refills: 0 | Status: DISCONTINUED | OUTPATIENT
Start: 2019-01-08 | End: 2019-01-08

## 2019-01-08 RX ORDER — ACETAMINOPHEN 500 MG
650 TABLET ORAL EVERY 6 HOURS
Qty: 0 | Refills: 0 | Status: DISCONTINUED | OUTPATIENT
Start: 2019-01-08 | End: 2019-01-09

## 2019-01-08 RX ORDER — PANTOPRAZOLE SODIUM 20 MG/1
40 TABLET, DELAYED RELEASE ORAL
Qty: 0 | Refills: 0 | Status: DISCONTINUED | OUTPATIENT
Start: 2019-01-08 | End: 2019-01-09

## 2019-01-08 RX ADMIN — CEFTRIAXONE 100 GRAM(S): 500 INJECTION, POWDER, FOR SOLUTION INTRAMUSCULAR; INTRAVENOUS at 02:06

## 2019-01-08 RX ADMIN — PIPERACILLIN AND TAZOBACTAM 25 GRAM(S): 4; .5 INJECTION, POWDER, LYOPHILIZED, FOR SOLUTION INTRAVENOUS at 17:55

## 2019-01-08 RX ADMIN — ATORVASTATIN CALCIUM 80 MILLIGRAM(S): 80 TABLET, FILM COATED ORAL at 22:20

## 2019-01-08 RX ADMIN — Medication 650 MILLIGRAM(S): at 22:20

## 2019-01-08 RX ADMIN — PIPERACILLIN AND TAZOBACTAM 25 GRAM(S): 4; .5 INJECTION, POWDER, LYOPHILIZED, FOR SOLUTION INTRAVENOUS at 09:43

## 2019-01-08 RX ADMIN — LOSARTAN POTASSIUM 25 MILLIGRAM(S): 100 TABLET, FILM COATED ORAL at 13:10

## 2019-01-08 RX ADMIN — PANTOPRAZOLE SODIUM 40 MILLIGRAM(S): 20 TABLET, DELAYED RELEASE ORAL at 18:34

## 2019-01-08 RX ADMIN — Medication 81 MILLIGRAM(S): at 12:24

## 2019-01-08 RX ADMIN — SODIUM CHLORIDE 100 MILLILITER(S): 9 INJECTION, SOLUTION INTRAVENOUS at 16:46

## 2019-01-08 RX ADMIN — Medication 650 MILLIGRAM(S): at 22:50

## 2019-01-08 RX ADMIN — Medication 650 MILLIGRAM(S): at 06:08

## 2019-01-08 RX ADMIN — Medication 650 MILLIGRAM(S): at 04:42

## 2019-01-08 RX ADMIN — SODIUM CHLORIDE 100 MILLILITER(S): 9 INJECTION INTRAMUSCULAR; INTRAVENOUS; SUBCUTANEOUS at 06:55

## 2019-01-08 RX ADMIN — Medication 25 MILLIGRAM(S): at 12:24

## 2019-01-08 NOTE — CONSULT NOTE ADULT - ATTENDING COMMENTS
seen and examined 01-08-19 @ 0115    12/17/2018 - s/p percutaneous cholecystostomy for acute cholecystitis with septic shock  12/23/2018 - discharged home  1/5/2019 - cholecystostomy tube stopped drained  1/7/2019 - cholecystostomy tube found to be dislodged and could not be replaced    gallbladder fluid cultures (12/17/2018) - Citrobacter freundii    CT abd/pelvis (1/7/2019) - contracted gallbladder, trace fluid in pelvis      dislodged cholecystostomy tube  -ABx based on recent cultures  -if patient develops recurrent acute cholecystitis will need replacement of cholecystostomy tube by IR  -trauma surgery will follow

## 2019-01-08 NOTE — CONSULT NOTE ADULT - SUBJECTIVE AND OBJECTIVE BOX
Surgery Consultation Note  =====================================================  HPI:  73M w/  CABG x4 (2016), PVD s/p LLE stent (2018), HTN, DM2,     74yo Gabonese-speaking M with PMH appendectomy, CAB x 4 2016, PVD s/p LLE stent 1 yr ago, HTN, DM2, HLD, recent admission with SICU stay 12/16-23 s/p IR perc huy tube presenting after IR tube check earlier today s/p perc huy tube removal after found to be malpositioned. Tube check was performed by Dr. Berry. On my arrival the patient is rigoring, hemodynamically stable, mentating. Blood cultures x 2 already collected. Other symptoms include abdominal pain this afternoon starting around 3 pm (before his tube check with IR). At home, perc huy tube was noted to not be draining for about the past 48 hours, prompting IR procedure today.     Allergies:   PAST MEDICAL & SURGICAL HISTORY:  DM (diabetes mellitus)  HLD (hyperlipidemia)  HTN (hypertension)  CAD (coronary artery disease)  History of appendectomy  S/P CABG x 4    FAMILY HISTORY:  No pertinent family history in first degree relatives      SOCIAL HISTORY:  ***    ADVANCE DIRECTIVES: Presumed Full Code  ***    REVIEW OF SYSTEMS:  ***  General: Non-Contributory  Skin/Breast: Non-Contributory  Ophthalmologic: Non-Contributory  ENMT: Non-Contributory  Respiratory and Thorax: Non-Contributory  Cardiovascular: Non-Contributory  Gastrointestinal: Non-Contributory  Genitourinary: Non-Contributory  Musculoskeletal: Non-Contributory  Neurological: Non-Contributory  Psychiatric: Non-Contributory  Hematology/Lymphatics: Non-Contributory  Endocrine: Non-Contributory  Allergic/Immunologic: Non-Contributory    HOME MEDICATIONS:  ***    CURRENT MEDICATIONS:   --------------------------------------------------------------------------------------  Neurologic Medications    Respiratory Medications    Cardiovascular Medications    Gastrointestinal Medications    Genitourinary Medications    Hematologic/Oncologic Medications    Antimicrobial/Immunologic Medications  cefTRIAXone   IVPB        Endocrine/Metabolic Medications    Topical/Other Medications    --------------------------------------------------------------------------------------    VITAL SIGNS, INS/OUTS (last 24 hours):  --------------------------------------------------------------------------------------  ((Insert SICU Vitals / Is+Os here)) ***  --------------------------------------------------------------------------------------    EXAM  NEUROLOGY  RASS:   	GCS:    Exam: Normal, NAD, alert, oriented x 3, no focal deficits. ***    HEENT  Exam: Normocephalic, atraumatic.  EOMI ***    RESPIRATORY  Exam: Lungs clear to auscultation, Normal expansion/effort.  ***  Mechanical Ventilation:     CARDIOVASCULAR  Exam: S1, S2.  Regular rate and rhythm.  Peripheral edema  ***    GI/NUTRITION  Exam: Abdomen soft, Non-tender, Non-distended.  ***  Wound:   ***  Current Diet:  NPO ***    VASCULAR  Exam: Extremities warm, pink, well-perfused.  ***    MUSCULOSKELETAL  Exam: All extremities moving spontaneously without limitations.  ***    SKIN:  Exam: Good skin turgor, no skin breakdown.  ***    METABOLIC/FLUIDS/ELECTROLYTES      HEMATOLOGIC  [x] DVT Prophylaxis:   Transfusions:	[] PRBC	[] Platelets		[] FFP	[] Cryoprecipitate    INFECTIOUS DISEASE  Antimicrobials/Immunologic Medications:  cefTRIAXone   IVPB        Day #  	of    ***    Tubes/Lines/Drains  ***  [x] Peripheral IV  [] Central Venous Line     	[] R	[] L	[] IJ	[] Fem	[] SC	Date Placed:   [] Arterial Line		[] R	[] L	[] Fem	[] Rad	[] Ax	Date Placed:   [] PICC:         	[] Midline		[] Mediport  [] Urinary Catheter		Date Placed:     LABS  --------------------------------------------------------------------------------------  ((Insert SICU Labs here))***  --------------------------------------------------------------------------------------    OTHER LABS    IMAGING RESULTS  Echo:   CT:   Xray:     ASSESSMENT:  73y Male ***    PLAN:  ***  Neurologic:   Respiratory:   Cardiovascular:   Gastrointestinal/Nutrition:   Renal/Genitourinary:   Hematologic:   Infectious Disease:   Tubes/Lines/Drains:   Endocrine:   Disposition:     --------------------------------------------------------------------------------------    Critical Care Diagnoses: Surgery Consultation Note  =====================================================  HPI:  73M w/  CABG x4 (2016), PVD s/p LLE stent (2018), HTN, DM2, gangrenous cholecystitis s/p IR drainage on 12/17 presents to ED following tube check w/ dislodged tube. Patient examined at bedside, Egyptian speaking with daughter translating and providing story. Patient was discharged on 12/23 and has been recovering well at home. Two days ago, patient's daughter noted that the drainage from the percutaneous cholecystostomy tube had decreased from high volume output and had a follow up appointment with Dr. Pennington. Patient was referred to Dr. Berry for a tube check, which was noted to be malpositioned and had to be removed. Dr. Berry was not able to replace the tube as the gallbladder was contracted. Patient reports that when he transferred to the ED, he began to have abdominal pain associated with rigors.     In ED, patient had labs notable for leukocytosis 10.2, venous lactate 2.4, and Cr 1.25. CT A/P demonstrates contracted, thick walled gallbladder with focus of intraluminal air, small pelvic free fluid.     Allergies:   PAST MEDICAL & SURGICAL HISTORY:  DM (diabetes mellitus)  HLD (hyperlipidemia)  HTN (hypertension)  CAD (coronary artery disease)  History of appendectomy  S/P CABG x 4    FAMILY HISTORY:  No pertinent family history in first degree relatives    SOCIAL HISTORY:    Lives at home with daughter    ADVANCE DIRECTIVES: Presumed Full Code     REVIEW OF SYSTEMS:   General: Non-Contributory  Skin/Breast: Non-Contributory  Ophthalmologic: Non-Contributory  ENMT: Non-Contributory  Respiratory and Thorax: Non-Contributory  Cardiovascular: Non-Contributory  Gastrointestinal: Non-Contributory  Genitourinary: Non-Contributory  Musculoskeletal: Non-Contributory  Neurological: Non-Contributory  Psychiatric: Non-Contributory  Hematology/Lymphatics: Non-Contributory  Endocrine: Non-Contributory  Allergic/Immunologic: Non-Contributory    HOME MEDICATIONS:   Home Medications:  aspirin 81 mg oral tablet: 1 tab(s) orally once a day (16 Dec 2018 18:18)  atorvastatin 80 mg oral tablet: 1 tab(s) orally once a day (16 Dec 2018 18:18)  cilostazol 50 mg oral tablet: 1 tab(s) orally 2 times a day (16 Dec 2018 18:18)  losartan 25 mg oral tablet: 1 tab(s) orally once a day (16 Dec 2018 18:18)  metFORMIN 500 mg oral tablet: 1 tab(s) orally 2 times a day (16 Dec 2018 18:18)  metoprolol succinate 25 mg oral tablet, extended release: 1 tab(s) orally once a day (16 Dec 2018 18:18)  Plavix 75 mg oral tablet: 1 tab(s) orally once a day (16 Dec 2018 18:18)    CURRENT MEDICATIONS:   --------------------------------------------------------------------------------------  Neurologic Medications    Respiratory Medications    Cardiovascular Medications    Gastrointestinal Medications    Genitourinary Medications    Hematologic/Oncologic Medications    Antimicrobial/Immunologic Medications  cefTRIAXone   IVPB        Endocrine/Metabolic Medications    Topical/Other Medications    --------------------------------------------------------------------------------------    VITAL SIGNS, INS/OUTS (last 24 hours):  --------------------------------------------------------------------------------------  Vital Signs Last 24 Hrs  T(C): 36.8 (08 Jan 2019 02:11), Max: 38.2 (07 Jan 2019 19:31)  T(F): 98.2 (08 Jan 2019 02:11), Max: 100.8 (07 Jan 2019 19:31)  HR: 79 (08 Jan 2019 02:11) (78 - 88)  BP: 97/62 (08 Jan 2019 02:11) (95/60 - 150/91)  BP(mean): 70 (08 Jan 2019 02:11) (65 - 70)  RR: 18 (08 Jan 2019 02:11) (18 - 18)  SpO2: 95% (08 Jan 2019 02:11) (95% - 100%)  --------------------------------------------------------------------------------------    EXAM  General: NAD, resting comfortably in bed  Resp: unlabored breathing on RA  CV: HDS, rrr  Abd: soft, nondistended, tender to RLQ>RUQ, no rebound tenderness of guarding  Extr: wwp      LABS  --------------------------------------------------------------------------------------  CBC (01-07 @ 19:11)                          15.0                     10.2    )--------------(  209        77.8<H>% Neuts, 14.6  % Lymphs, ANC: 7.9<H>                          43.5      BMP (01-07 @ 19:11)       138     |  99      |  12    			Ca++ --      Ca 9.5          ---------------------------------( 121<H>		Mg --           4.0     |  22      |  1.25  			Ph --        LFTs (01-07 @ 19:11)      TPro 7.6 / Alb 4.2 / TBili 0.6 / DBili -- / AST 24 / ALT 34 / AlkPhos 112    Coags (01-07 @ 19:11)  aPTT 27.4<L> / INR 1.13 / PT 12.9    VBG (01-07 @ 20:50)     -- / -- / -- / -- / -- / --%      Lactate: 1.9  VBG (01-07 @ 19:11)     -- / -- / -- / -- / -- / --%      Lactate: 2.4<H>    Urinalysis (01-08 @ 02:40):     Color: Light Yellow / Appearance: Clear / SG: >1.050<!> / pH: 6.0 / Gluc: Negative / Ketones: Negative / Bili: Negative / Urobili: Negative / Protein :Trace<!> / Nitrites: Negative / Leuk.Est: Negative / RBC: 1 / WBC: 1 / Sq Epi:  / Non Sq Epi: 1 / Bacteria Negative     --------------------------------------------------------------------------------------    OTHER LABS    IMAGING RESULTS  < from: CT Abdomen and Pelvis w/ IV Cont (01.07.19 @ 21:14) >  LOWER CHEST: Atherosclerotic calcifications of the coronary arteries.    Calcification aortic valve leaflets.  Postsurgical changesfrom median   sternotomy are partially visualized.    LIVER: Within normal limits.  BILE DUCTS: Normal caliber.  GALLBLADDER: Contracted, thick-walled gallbladder.  Punctate focus of gas   and small amount of hyperattenuating material within the gallbladder   lumen.  Mild pericholecystic inflammatory change.  SPLEEN: Within normal limits.  PANCREAS: Within normal limits.  ADRENALS: Within normal limits.  KIDNEYS/URETERS: Subcentimeter right interpolar hypodense focus which is   too small to characterize. No hydronephrosis.    BLADDER: Within normal limits.  REPRODUCTIVE ORGANS: Prostate within normal limits.    BOWEL: Small hiatal hernia.  No bowel obstruction.  Appendix not   visualized.  Nonspecific air-fluid levels in the colon.  Mild   diverticulosis of the distal descending and proximal sigmoid colon.  PERITONEUM: Trace pelvic free fluid.  No loculated collection or   pneumoperitoneum.    VESSELS:  Atherosclerotic calcifications of the aortoiliac tree.  Severe   stenosis in the left common iliac artery.  Moderate stenoses in the right   common iliac artery and bilateral external iliac arteries.  A stent is   partially visualized in the left superficial femoral artery.  RETROPERITONEUM: A nonspecific portacaval lymph node measuring 1.4 cm   short access is stable since 12/17/2018.    ABDOMINAL WALL: Post procedural changes in the right upper quadrant   ventral abdominal wall.  BONES: Median sternotomy. Degenerative changes.    IMPRESSION: Contracted, thick-walled gallbladder containing a punctate   focus of intraluminal gas that is likely postprocedural.  Small amount of   dependent hyperattenuating material in the gallbladder may represent   gallstones versus injected contrast material.  A right upper quadrant   ultrasound can be performed as clinically warranted.  If there is   clinical concern for a bile leak a nuclear medicine scan can be performed.                FESTUS SCHWARZ M.D., RADIOLOGY RESIDENT  This document has been electronically signed.  LEIGH AQUINO M.D.,ATTENDING RADIOLOGIST  This document has been electronically signed. Jan 7 2019  9:53PM    < end of copied text >

## 2019-01-08 NOTE — H&P ADULT - PROBLEM SELECTOR PLAN 1
Patient is admitted for replacement of dislodged cholecystostomy tube.  Keep patient NPO for now.   Will start IVF at 100cc/hr   Continue zosyn 3.375 q8h   Follow up blood cultures and UCx. Patient is admitted for replacement of dislodged cholecystostomy tube. Consult IR in AM.  Keep patient NPO for now.   Will start IVF at 100cc/hr   Continue zosyn 3.375 q8h   Follow up blood cultures and UCx.

## 2019-01-08 NOTE — H&P ADULT - NSHPLABSRESULTS_GEN_ALL_CORE
Labs, imaging and EKG personally reviewed by me.     CBC is unremarkable. Coags were unremarkable.   CMP notable for elevated SCr of 1.25 (baseline appears 0.99-1.3).   Lactate elevated at 2.4, repeat of 1.9.     LABS:                        15.0   10.2  )-----------( 209      ( 07 Jan 2019 19:11 )             43.5     Hgb Trend: 15.0<--  01-07    138  |  99  |  12  ----------------------------<  121<H>  4.0   |  22  |  1.25    Ca    9.5      07 Jan 2019 19:11    TPro  7.6  /  Alb  4.2  /  TBili  0.6  /  DBili  x   /  AST  24  /  ALT  34  /  AlkPhos  112  01-07    Creatinine Trend: 1.25<--, 0.99<--, 1.16<--, 1.01<--, 1.18<--, 1.17<--  PT/INR - ( 07 Jan 2019 19:11 )   PT: 12.9 sec;   INR: 1.13 ratio         PTT - ( 07 Jan 2019 19:11 )  PTT:27.4 sec  Urinalysis Basic - ( 08 Jan 2019 02:40 )    Color: Light Yellow / Appearance: Clear / SG: >1.050 / pH: x  Gluc: x / Ketone: Negative  / Bili: Negative / Urobili: Negative   Blood: x / Protein: Trace / Nitrite: Negative   Leuk Esterase: Negative / RBC: 1 /hpf / WBC 1 /hpf   Sq Epi: x / Non Sq Epi: 1 /hpf / Bacteria: Negative    Venous Blood Gas:  01-07 @ 20:50  --/--/--/--/--  VBG Lactate: 1.9  Venous Blood Gas:  01-07 @ 19:11  --/--/--/--/--  VBG Lactate: 2.4    < from: CT Abdomen and Pelvis w/ IV Cont (01.07.19 @ 21:14) >    INTERPRETATION:  CLINICAL INFORMATION: Status post removal of a   malpositioned percutaneous cholecystostomy tube earlier today.  Abdominal   pain.    COMPARISON: CT abdomen and pelvis performed 12/17/2018.  Fluoroscopic   images from cholecystectomy tube check on 01/07/2019 at 6:10 PM    PROCEDURE:   CT of the Abdomen and Pelvis was performed with intravenous contrast.   Intravenous contrast: 90 ml Omnipaque 350. 10 ml discarded.  Oral contrast: None.  Sagittal and coronal reformats were performed.    FINDINGS:    LOWER CHEST: Atherosclerotic calcifications of the coronary arteries.    Calcification aortic valve leaflets.  Postsurgical changesfrom median sternotomy are partially visualized.    LIVER: Within normal limits.  BILE DUCTS: Normal caliber.  GALLBLADDER: Contracted, thick-walled gallbladder.  Punctate focus of gas and small amount of hyperattenuating material within the gallbladder lumen.  Mild pericholecystic inflammatory change.  SPLEEN: Within normal limits.  PANCREAS: Within normal limits.  ADRENALS: Within normal limits.  KIDNEYS/URETERS: Subcentimeter right interpolar hypodense focus which is too small to characterize. No hydronephrosis.  BLADDER: Within normal limits.  REPRODUCTIVE ORGANS: Prostate within normal limits.    BOWEL: Small hiatal hernia.  No bowel obstruction.  Appendix not visualized.  Nonspecific air-fluid levels in the colon.  Mild diverticulosis of the distal descending and proximal sigmoid colon.  PERITONEUM: Trace pelvic free fluid.  No loculated collection or pneumoperitoneum.    VESSELS:  Atherosclerotic calcifications of the aortoiliac tree.  Severe stenosis in the left common iliac artery.  Moderate stenoses    < end of copied text >     in the right   common iliac artery and bilateral external iliac arteries.  A stent is partially visualized in the left superficial femoral artery.  RETROPERITONEUM: A nonspecific portacaval lymph node measuring 1.4 cm   short access is stable since 12/17/2018.    ABDOMINAL WALL: Post procedural changes in the right upper quadrant ventral abdominal wall.  BONES: Median sternotomy. Degenerative changes.    IMPRESSION: Contracted, thick-walled gallbladder containing a punctate focus of intraluminal gas that is likely postprocedural.  Small amount of dependent hyperattenuating material in the gallbladder may represent gallstones versus injected contrast material.  A right upper quadrant ultrasound can be performed as clinically warranted.  If there is clinical concern for a bile leak a nuclear medicine scan can be performed.    < from: Xray Chest 1 View AP/PA (01.07.19 @ 20:59) >    ******PRELIMINARY REPORT******          INTERPRETATION:  No free air.    < end of copied text > Labs, imaging and EKG personally reviewed by me.     CBC is unremarkable. Coags were unremarkable.   CMP notable for elevated SCr of 1.25 (baseline appears 0.99-1.3).   Lactate elevated at 2.4, repeat of 1.9.     HR sinus 90    LABS:                        15.0   10.2  )-----------( 209      ( 07 Jan 2019 19:11 )             43.5     Hgb Trend: 15.0<--  01-07    138  |  99  |  12  ----------------------------<  121<H>  4.0   |  22  |  1.25    Ca    9.5      07 Jan 2019 19:11    TPro  7.6  /  Alb  4.2  /  TBili  0.6  /  DBili  x   /  AST  24  /  ALT  34  /  AlkPhos  112  01-07    Creatinine Trend: 1.25<--, 0.99<--, 1.16<--, 1.01<--, 1.18<--, 1.17<--  PT/INR - ( 07 Jan 2019 19:11 )   PT: 12.9 sec;   INR: 1.13 ratio         PTT - ( 07 Jan 2019 19:11 )  PTT:27.4 sec  Urinalysis Basic - ( 08 Jan 2019 02:40 )    Color: Light Yellow / Appearance: Clear / SG: >1.050 / pH: x  Gluc: x / Ketone: Negative  / Bili: Negative / Urobili: Negative   Blood: x / Protein: Trace / Nitrite: Negative   Leuk Esterase: Negative / RBC: 1 /hpf / WBC 1 /hpf   Sq Epi: x / Non Sq Epi: 1 /hpf / Bacteria: Negative    Venous Blood Gas:  01-07 @ 20:50  --/--/--/--/--  VBG Lactate: 1.9  Venous Blood Gas:  01-07 @ 19:11  --/--/--/--/--  VBG Lactate: 2.4    < from: CT Abdomen and Pelvis w/ IV Cont (01.07.19 @ 21:14) >    INTERPRETATION:  CLINICAL INFORMATION: Status post removal of a   malpositioned percutaneous cholecystostomy tube earlier today.  Abdominal   pain.    COMPARISON: CT abdomen and pelvis performed 12/17/2018.  Fluoroscopic   images from cholecystectomy tube check on 01/07/2019 at 6:10 PM    PROCEDURE:   CT of the Abdomen and Pelvis was performed with intravenous contrast.   Intravenous contrast: 90 ml Omnipaque 350. 10 ml discarded.  Oral contrast: None.  Sagittal and coronal reformats were performed.    FINDINGS:    LOWER CHEST: Atherosclerotic calcifications of the coronary arteries.    Calcification aortic valve leaflets.  Postsurgical changesfrom median sternotomy are partially visualized.    LIVER: Within normal limits.  BILE DUCTS: Normal caliber.  GALLBLADDER: Contracted, thick-walled gallbladder.  Punctate focus of gas and small amount of hyperattenuating material within the gallbladder lumen.  Mild pericholecystic inflammatory change.  SPLEEN: Within normal limits.  PANCREAS: Within normal limits.  ADRENALS: Within normal limits.  KIDNEYS/URETERS: Subcentimeter right interpolar hypodense focus which is too small to characterize. No hydronephrosis.  BLADDER: Within normal limits.  REPRODUCTIVE ORGANS: Prostate within normal limits.    BOWEL: Small hiatal hernia.  No bowel obstruction.  Appendix not visualized.  Nonspecific air-fluid levels in the colon.  Mild diverticulosis of the distal descending and proximal sigmoid colon.  PERITONEUM: Trace pelvic free fluid.  No loculated collection or pneumoperitoneum.    VESSELS:  Atherosclerotic calcifications of the aortoiliac tree.  Severe stenosis in the left common iliac artery.  Moderate stenoses    < end of copied text >     in the right   common iliac artery and bilateral external iliac arteries.  A stent is partially visualized in the left superficial femoral artery.  RETROPERITONEUM: A nonspecific portacaval lymph node measuring 1.4 cm   short access is stable since 12/17/2018.    ABDOMINAL WALL: Post procedural changes in the right upper quadrant ventral abdominal wall.  BONES: Median sternotomy. Degenerative changes.    IMPRESSION: Contracted, thick-walled gallbladder containing a punctate focus of intraluminal gas that is likely postprocedural.  Small amount of dependent hyperattenuating material in the gallbladder may represent gallstones versus injected contrast material.  A right upper quadrant ultrasound can be performed as clinically warranted.  If there is clinical concern for a bile leak a nuclear medicine scan can be performed.    < from: Xray Chest 1 View AP/PA (01.07.19 @ 20:59) >    ******PRELIMINARY REPORT******          INTERPRETATION:  No free air.    < end of copied text > Labs, imaging and EKG personally reviewed by me.     CBC is unremarkable. Coags were unremarkable.   CMP notable for elevated SCr of 1.25 (baseline appears 0.99-1.3).   Lactate elevated at 2.4, repeat of 1.9.     HR sinus 90 normal axis, QTc 505     LABS:                        15.0   10.2  )-----------( 209      ( 07 Jan 2019 19:11 )             43.5     Hgb Trend: 15.0<--  01-07    138  |  99  |  12  ----------------------------<  121<H>  4.0   |  22  |  1.25    Ca    9.5      07 Jan 2019 19:11    TPro  7.6  /  Alb  4.2  /  TBili  0.6  /  DBili  x   /  AST  24  /  ALT  34  /  AlkPhos  112  01-07    Creatinine Trend: 1.25<--, 0.99<--, 1.16<--, 1.01<--, 1.18<--, 1.17<--  PT/INR - ( 07 Jan 2019 19:11 )   PT: 12.9 sec;   INR: 1.13 ratio         PTT - ( 07 Jan 2019 19:11 )  PTT:27.4 sec  Urinalysis Basic - ( 08 Jan 2019 02:40 )    Color: Light Yellow / Appearance: Clear / SG: >1.050 / pH: x  Gluc: x / Ketone: Negative  / Bili: Negative / Urobili: Negative   Blood: x / Protein: Trace / Nitrite: Negative   Leuk Esterase: Negative / RBC: 1 /hpf / WBC 1 /hpf   Sq Epi: x / Non Sq Epi: 1 /hpf / Bacteria: Negative    Venous Blood Gas:  01-07 @ 20:50  --/--/--/--/--  VBG Lactate: 1.9  Venous Blood Gas:  01-07 @ 19:11  --/--/--/--/--  VBG Lactate: 2.4    < from: CT Abdomen and Pelvis w/ IV Cont (01.07.19 @ 21:14) >  COMPARISON: CT abdomen and pelvis performed 12/17/2018.  Fluoroscopic images from cholecystectomy tube check on 01/07/2019 at 6:10 PM    PROCEDURE:   CT of the Abdomen and Pelvis was performed with intravenous contrast.   Intravenous contrast: 90 ml Omnipaque 350. 10 ml discarded.  Oral contrast: None.  Sagittal and coronal reformats were performed.    FINDINGS:    LOWER CHEST: Atherosclerotic calcifications of the coronary arteries.    Calcification aortic valve leaflets.  Postsurgical changes from median sternotomy are partially visualized.    LIVER: Within normal limits.  BILE DUCTS: Normal caliber.  GALLBLADDER: Contracted, thick-walled gallbladder.  Punctate focus of gas and small amount of hyperattenuating material within the gallbladder lumen.  Mild pericholecystic inflammatory change.  SPLEEN: Within normal limits.  PANCREAS: Within normal limits.  ADRENALS: Within normal limits.  KIDNEYS/URETERS: Subcentimeter right interpolar hypodense focus which is too small to characterize. No hydronephrosis.  BLADDER: Within normal limits.  REPRODUCTIVE ORGANS: Prostate within normal limits.    BOWEL: Small hiatal hernia.  No bowel obstruction.  Appendix not visualized.  Nonspecific air-fluid levels in the colon.  Mild diverticulosis of the distal descending and proximal sigmoid colon.  PERITONEUM: Trace pelvic free fluid.  No loculated collection or pneumoperitoneum.    VESSELS:  Atherosclerotic calcifications of the aortoiliac tree.  Severe stenosis in the left common iliac artery.  Moderate stenoses    < end of copied text >     in the right   common iliac artery and bilateral external iliac arteries.  A stent is partially visualized in the left superficial femoral artery.  RETROPERITONEUM: A nonspecific portacaval lymph node measuring 1.4 cm   short access is stable since 12/17/2018.    ABDOMINAL WALL: Post procedural changes in the right upper quadrant ventral abdominal wall.  BONES: Median sternotomy. Degenerative changes.    IMPRESSION: Contracted, thick-walled gallbladder containing a punctate focus of intraluminal gas that is likely postprocedural.  Small amount of dependent hyperattenuating material in the gallbladder may represent gallstones versus injected contrast material.  A right upper quadrant ultrasound can be performed as clinically warranted.  If there is clinical concern for a bile leak a nuclear medicine scan can be performed.    < from: Xray Chest 1 View AP/PA (01.07.19 @ 20:59) >    ******PRELIMINARY REPORT******          INTERPRETATION:  No free air.    < end of copied text > Labs, imaging and EKG personally reviewed by me.     CBC is unremarkable. Coags were unremarkable.   CMP notable for elevated SCr of 1.25 (baseline appears 0.9-1.0.   Lactate elevated at 2.4, repeat of 1.9.     HR sinus 90 normal axis, QTc 505     LABS:                        15.0   10.2  )-----------( 209      ( 07 Jan 2019 19:11 )             43.5     Hgb Trend: 15.0<--  01-07    138  |  99  |  12  ----------------------------<  121<H>  4.0   |  22  |  1.25    Ca    9.5      07 Jan 2019 19:11    TPro  7.6  /  Alb  4.2  /  TBili  0.6  /  DBili  x   /  AST  24  /  ALT  34  /  AlkPhos  112  01-07    Creatinine Trend: 1.25<--, 0.99<--, 1.16<--, 1.01<--, 1.18<--, 1.17<--  PT/INR - ( 07 Jan 2019 19:11 )   PT: 12.9 sec;   INR: 1.13 ratio         PTT - ( 07 Jan 2019 19:11 )  PTT:27.4 sec  Urinalysis Basic - ( 08 Jan 2019 02:40 )    Color: Light Yellow / Appearance: Clear / SG: >1.050 / pH: x  Gluc: x / Ketone: Negative  / Bili: Negative / Urobili: Negative   Blood: x / Protein: Trace / Nitrite: Negative   Leuk Esterase: Negative / RBC: 1 /hpf / WBC 1 /hpf   Sq Epi: x / Non Sq Epi: 1 /hpf / Bacteria: Negative    Venous Blood Gas:  01-07 @ 20:50  --/--/--/--/--  VBG Lactate: 1.9  Venous Blood Gas:  01-07 @ 19:11  --/--/--/--/--  VBG Lactate: 2.4    < from: CT Abdomen and Pelvis w/ IV Cont (01.07.19 @ 21:14) >  COMPARISON: CT abdomen and pelvis performed 12/17/2018.  Fluoroscopic images from cholecystectomy tube check on 01/07/2019 at 6:10 PM    PROCEDURE:   CT of the Abdomen and Pelvis was performed with intravenous contrast.   Intravenous contrast: 90 ml Omnipaque 350. 10 ml discarded.  Oral contrast: None.  Sagittal and coronal reformats were performed.    FINDINGS:    LOWER CHEST: Atherosclerotic calcifications of the coronary arteries.    Calcification aortic valve leaflets.  Postsurgical changes from median sternotomy are partially visualized.    LIVER: Within normal limits.  BILE DUCTS: Normal caliber.  GALLBLADDER: Contracted, thick-walled gallbladder.  Punctate focus of gas and small amount of hyperattenuating material within the gallbladder lumen.  Mild pericholecystic inflammatory change.  SPLEEN: Within normal limits.  PANCREAS: Within normal limits.  ADRENALS: Within normal limits.  KIDNEYS/URETERS: Subcentimeter right interpolar hypodense focus which is too small to characterize. No hydronephrosis.  BLADDER: Within normal limits.  REPRODUCTIVE ORGANS: Prostate within normal limits.    BOWEL: Small hiatal hernia.  No bowel obstruction.  Appendix not visualized.  Nonspecific air-fluid levels in the colon.  Mild diverticulosis of the distal descending and proximal sigmoid colon.  PERITONEUM: Trace pelvic free fluid.  No loculated collection or pneumoperitoneum.    VESSELS:  Atherosclerotic calcifications of the aortoiliac tree.  Severe stenosis in the left common iliac artery.  Moderate stenoses    < end of copied text >     in the right   common iliac artery and bilateral external iliac arteries.  A stent is partially visualized in the left superficial femoral artery.  RETROPERITONEUM: A nonspecific portacaval lymph node measuring 1.4 cm   short access is stable since 12/17/2018.    ABDOMINAL WALL: Post procedural changes in the right upper quadrant ventral abdominal wall.  BONES: Median sternotomy. Degenerative changes.    IMPRESSION: Contracted, thick-walled gallbladder containing a punctate focus of intraluminal gas that is likely postprocedural.  Small amount of dependent hyperattenuating material in the gallbladder may represent gallstones versus injected contrast material.  A right upper quadrant ultrasound can be performed as clinically warranted.  If there is clinical concern for a bile leak a nuclear medicine scan can be performed.    < from: Xray Chest 1 View AP/PA (01.07.19 @ 20:59) >    ******PRELIMINARY REPORT******          INTERPRETATION:  No free air.    < end of copied text >

## 2019-01-08 NOTE — CONSULT NOTE ADULT - ASSESSMENT
ASSESSMENT:  73M w/  CABG x4 (2016), PVD s/p LLE stent (2018), HTN, DM2, gangrenous cholecystitis s/p IR drainage on 12/17 presents to ED following tube check w/ dislodged tube.     - Patient without evidence of worsening cholecystitis (LFT wnl, leukocytosis 10.6) with contracted gallbladder, does display abdominal pain with rigors  - Patient requires close monitoring for acute worsening of condition  - IR consult to attempt repeat replacement of percutaneous cholecystostomy tube for continued source control  - Antibiotic therapy  - Surgery will continue to follow    Discussed with Dr. Arvind Pineda PGY-2  Surgery Pager [phone]

## 2019-01-08 NOTE — CHART NOTE - NSCHARTNOTEFT_GEN_A_CORE
Patient was transferred to [facility] ([id number]). Patient has RLQ abdominal pain and tenderness, near previous drain site. Will obtain HIDA scan to rule out cholecystitis.    JOSELINE Rudolph  [phone]

## 2019-01-08 NOTE — H&P ADULT - ASSESSMENT
This patient is a 73yoM with PMH of remote appendectomy, CAD CABG x 4 (2016), htn, DM, hLd who presents to the ED for dislodged percutaneous cholecystostomy tube. The patient was admitted from 12/16-12/23/2018 for abdominal pain, was found to have septic shock 2/2 acute cholecystitis and acute hypoxic respiratory failure. Pt was admitted to SICU and intubated. He had an IR placed percutaneous cholecystostomy tube. Patient thereafter improved, was extubated and weaned off pressors. Patient tolerated diet, and completed a 5 day course of antibiotics. Discharge instructions noted patient was to follow up with Dr. Samuels within 14 days after discharge from the hospital, for follow up with percutaneous cholecystotomy drain. Patient came to ED after IR tube check today by Dr. Berry. Percutaneous cholecystostomy tube was found to be partially dislodged. Per IR note, contrast was injected to tube and demonstrated opacification of gallbladder with spillage into peritoneum. At home, the percutaneous cholecystotomy tube was not draining for the past 48 hours. Patient did not have fever, chills or abdominal pain at home but was found to have fevers, rigors and new onset abdominal pain in the ED.   Pt has no family history of gallbladder disease.     In the ED, T  HR 88, /91, RR 18, SpO2 99%RA   Blood cultures obtained x 2. Pt was given tylenol, ceftriaxone 2g, morphine 2mg IVP, IVNS 2500cc, zosyn 3.375g IV x1 This patient is a 73yoM with PMH of remote appendectomy, CAD CABG x 4 (2016), htn, DM, hLd who presents to the ED for dislodged percutaneous cholecystostomy tube, found to have fever, requiring tube replacement.     The patient was admitted from 12/16-12/23/2018 for abdominal pain, was found to have septic shock 2/2 acute cholecystitis and acute hypoxic respiratory failure. Pt was admitted to SICU and intubated. He had an IR placed percutaneous cholecystostomy tube. Patient thereafter improved, was extubated and weaned off pressors. Patient tolerated diet, and completed a 5 day course of antibiotics. Discharge instructions noted patient was to follow up with Dr. Samuels within 14 days after discharge from the hospital, for follow up with percutaneous cholecystotomy drain. Patient came to ED after IR tube check today by Dr. Berry. Percutaneous cholecystostomy tube was found to be partially dislodged. Per IR note, contrast was injected to tube and demonstrated opacification of gallbladder with spillage into peritoneum. This patient is a 73yoM with PMH of remote appendectomy, CAD CABG x 4 (2016), htn, DM, hLd who presents to the ED for dislodged percutaneous cholecystostomy tube, found to have fever, requiring tube replacement.

## 2019-01-08 NOTE — PROGRESS NOTE ADULT - SUBJECTIVE AND OBJECTIVE BOX
Case d/w IR attending Dr. RANDY Berry.  No further IR intervention is planned at this time.      procedure site: (RUQ Abdomen) dressing was changed. no active bleeding or oozing from the site.      maintain dressing around the RUQ site and change the dressing as needed.     Dalton Mckeon, TYRA-C  [facility] [id number]  Ext [phone]

## 2019-01-08 NOTE — H&P ADULT - PROBLEM SELECTOR PLAN 5
VTE ppx- 2 based on age and relative immobility, start venodyne boots, can start lovenox subQ after procedure  Diet- NPO for now  Activity- OOB with assistance

## 2019-01-08 NOTE — H&P ADULT - HISTORY OF PRESENT ILLNESS
This patient is a 73yoM with PMH of remote appendectomy, CAD CABG x 4 (2016), htn, DM, hLd who presents to the ED    The patient was admitted from 12/16-12/23/2018 for abdominal pain, was found to have septic shock 2/2 acute cholecystitis and acute hypoxic respiratory failure. Pt was admitted to SICU and intubated. He had an IR placed percutaneous cholecystostomy tube. Patient thereafter improved, was extubated and weaned off pressors. Patient tolerated diet, and completed a 5 day course of antibiotics. Discharge instructions noted patient was to follow up with Dr. Samuels within 14 days after discharge from the hospital, for follow up with percutaneous cholecystotomy drain.    Patient came to ED after IR tube check today by Dr. Berry. Percutaneous uhy tube was removed after it was found to be malpositioned. He was unable to replace the tube. At home, the percutaneous cholecystotomy tube was not draining for the past 48 hours.       In the ED, T  HR 88, /91, RR 18, SpO2 99%RA   Blood cultures obtained x 2. This patient is a 73yoM with PMH of remote appendectomy, CAD CABG x 4 (2016), htn, DM, hLd who presents to the ED    The patient was admitted from 12/16-12/23/2018 for abdominal pain, was found to have septic shock 2/2 acute cholecystitis and acute hypoxic respiratory failure. Pt was admitted to SICU and intubated. He had an IR placed percutaneous cholecystostomy tube. Patient thereafter improved, was extubated and weaned off pressors. Patient tolerated diet, and completed a 5 day course of antibiotics. Discharge instructions noted patient was to follow up with Dr. Samuels within 14 days after discharge from the hospital, for follow up with percutaneous cholecystotomy drain.    Patient came to ED after IR tube check today by Dr. Berry. Percutaneous huy tube was removed after it was found to be malpositioned. He was unable to replace the tube. At home, the percutaneous cholecystotomy tube was not draining for the past 48 hours.       In the ED, T  HR 88, /91, RR 18, SpO2 99%RA   Blood cultures obtained x 2. Pt was given tylenol, ceftriaxone 2g, morphine 2mg IVP, IVNS 2500cc, zosyn 3.375g IV x1 This patient is a 73yoM with PMH of remote appendectomy, CAD CABG x 4 (2016), htn, DM, hLd who presents to the ED for dislodged percutaneous cholecystostomy tube. The patient was admitted from 12/16-12/23/2018 for abdominal pain, was found to have septic shock 2/2 acute cholecystitis and acute hypoxic respiratory failure. Pt was admitted to SICU and intubated. He had an IR placed percutaneous cholecystostomy tube. Patient thereafter improved, was extubated and weaned off pressors. Patient tolerated diet, and completed a 5 day course of antibiotics. Discharge instructions noted patient was to follow up with Dr. Samuels within 14 days after discharge from the hospital, for follow up with percutaneous cholecystotomy drain. Patient came to ED after IR tube check today by Dr. Berry. Percutaneous cholecystostomy tube was found to be partially dislodged. Per IR note, contrast was injected to tube and demonstrated opacification of gallbladder with spillage into peritoneum. At home, the percutaneous cholecystotomy tube was not draining for the past 48 hours. Patient did not have fever, chills or abdominal pain at home but was found to have fevers, rigors and new onset abdominal pain in the ED.   Pt has no family history of gallbladder disease.     In the ED, T  HR 88, /91, RR 18, SpO2 99%RA   Blood cultures obtained x 2. Pt was given tylenol, ceftriaxone 2g, morphine 2mg IVP, IVNS 2500cc, zosyn 3.375g IV x1

## 2019-01-08 NOTE — H&P ADULT - NSHPPHYSICALEXAM_GEN_ALL_CORE
Vital Signs Last 24 Hrs  T(C): 36.8 (08 Jan 2019 03:57), Max: 38.2 (07 Jan 2019 19:31)  T(F): 98.2 (08 Jan 2019 03:57), Max: 100.8 (07 Jan 2019 19:31)  HR: 85 (08 Jan 2019 03:57) (78 - 88)  BP: 105/67 (08 Jan 2019 03:57) (95/60 - 150/91)  BP(mean): 70 (08 Jan 2019 02:11) (65 - 70)  RR: 18 (08 Jan 2019 03:57) (18 - 18)  SpO2: 95% (08 Jan 2019 03:57) (95% - 100%)    PHYSICAL EXAM:  GENERAL: NAD, well-groomed, well-developed  HEAD:  Atraumatic, Normocephalic  EYES: EOMI, PERRLA, conjunctiva and sclera clear  ENMT: No oropharyngeal exudates, erythema or lesions,  Moist mucous membranes  NECK: Supple, no cervical lymphadenopathy  NERVOUS SYSTEM:  Alert & Oriented X3, CN II-XII intact, 5/5 BUE and BLE motor strength,   CHEST/LUNG: Clear to auscultation anteriorly on exam; No rales, no rhonchi, no wheezing  HEART: Regular rate and rhythm; No murmurs, rubs, or gallops  ABDOMEN: Soft, Tender to palpation mostly at RUQ, Nondistended, minimal bloody gauze and tegaderm covering site of previous cholecystostomy tube, with no surrounding erythema or exudates  EXTREMITIES:  2+ Peripheral Pulses, No clubbing, cyanosis, or edema  LYMPH: No cervical lymphadenopathy noted  SKIN: No rashes or lesions

## 2019-01-08 NOTE — H&P ADULT - PROBLEM SELECTOR PLAN 4
VTE ppx- 2 based on age and relative immobility, start venodyne boots, can start lovenox subQ after procedure  Diet- NPO for now  Activity- OOB with assistance Pt appears to have elevated SCr, but does not meet criteria for VIVIENNE.   Suspect pre-renal due to mild hypovolemia.  Continue IVF and repeat AM BMP.

## 2019-01-08 NOTE — PROGRESS NOTE ADULT - SUBJECTIVE AND OBJECTIVE BOX
Patient seen and examined this afternoon  Non-toxic appearing  c/o pain in upper abdomen  afebrile  vitals stable  abd - nondistended, soft, mild RUQ and LUQ tenderness  WBC & LFTs=WNL x 2 (last night and today)  CT scan without obvious source of pain  HIDA scan without bile leak, showing normal uptake by liver and visualization of the bowel and gallbladder    - Patient and daughter feel that pain is very similar to cholecystitis pain that lead to sepsis during 12/16/18 admission.   - I have discussed with patient and daughter that, although the patient has an indication for cholecystectomy (biliary sepsis during last admission), we would usually wait a longer time frame (at least 6 weeks, if not more) before attempting cholecystectomy for maximal resolution of inflammation.  I have discussed the greater risk of conversation to open procedure and hugo/post operative complications to an attempt at cholecystectomy at this time.  It is unclear what the source of the current pain is, given normal labs and negative CT & HIDA scan.  Will observe patient overnight and repeat labs to see if pain changes.

## 2019-01-08 NOTE — H&P ADULT - PROBLEM SELECTOR PLAN 2
Start home dose of baby aspirin 81mg PO qd, lipitor 80mg PO qd and plavix 75mg PO qd.  Hold losartan in setting of borderline BP. Rescheduled metoprolol succinate ER 25mg PO qd for later in evening, with hold parameter.

## 2019-01-08 NOTE — H&P ADULT - PROBLEM SELECTOR PLAN 3
Start home dose of cilostazol 100mg PO BID.  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

## 2019-01-09 ENCOUNTER — TRANSCRIPTION ENCOUNTER (OUTPATIENT)
Age: 74
End: 2019-01-09

## 2019-01-09 VITALS
OXYGEN SATURATION: 96 % | RESPIRATION RATE: 18 BRPM | SYSTOLIC BLOOD PRESSURE: 120 MMHG | DIASTOLIC BLOOD PRESSURE: 74 MMHG | TEMPERATURE: 98 F | HEART RATE: 66 BPM

## 2019-01-09 LAB
ALBUMIN SERPL ELPH-MCNC: 3 G/DL — LOW (ref 3.3–5)
ALP SERPL-CCNC: 84 U/L — SIGNIFICANT CHANGE UP (ref 40–120)
ALT FLD-CCNC: 20 U/L — SIGNIFICANT CHANGE UP (ref 10–45)
ANION GAP SERPL CALC-SCNC: 12 MMOL/L — SIGNIFICANT CHANGE UP (ref 5–17)
AST SERPL-CCNC: 15 U/L — SIGNIFICANT CHANGE UP (ref 10–40)
BILIRUB DIRECT SERPL-MCNC: 0.1 MG/DL — SIGNIFICANT CHANGE UP (ref 0–0.2)
BILIRUB INDIRECT FLD-MCNC: 0.4 MG/DL — SIGNIFICANT CHANGE UP (ref 0.2–1)
BILIRUB SERPL-MCNC: 0.5 MG/DL — SIGNIFICANT CHANGE UP (ref 0.2–1.2)
BUN SERPL-MCNC: 6 MG/DL — LOW (ref 7–23)
CALCIUM SERPL-MCNC: 8.2 MG/DL — LOW (ref 8.4–10.5)
CHLORIDE SERPL-SCNC: 105 MMOL/L — SIGNIFICANT CHANGE UP (ref 96–108)
CO2 SERPL-SCNC: 20 MMOL/L — LOW (ref 22–31)
CREAT SERPL-MCNC: 1.09 MG/DL — SIGNIFICANT CHANGE UP (ref 0.5–1.3)
CULTURE RESULTS: NO GROWTH — SIGNIFICANT CHANGE UP
GLUCOSE SERPL-MCNC: 100 MG/DL — HIGH (ref 70–99)
HCT VFR BLD CALC: 39.3 % — SIGNIFICANT CHANGE UP (ref 39–50)
HGB BLD-MCNC: 13.3 G/DL — SIGNIFICANT CHANGE UP (ref 13–17)
MAGNESIUM SERPL-MCNC: 1.9 MG/DL — SIGNIFICANT CHANGE UP (ref 1.6–2.6)
MCHC RBC-ENTMCNC: 29.6 PG — SIGNIFICANT CHANGE UP (ref 27–34)
MCHC RBC-ENTMCNC: 33.8 GM/DL — SIGNIFICANT CHANGE UP (ref 32–36)
MCV RBC AUTO: 87.3 FL — SIGNIFICANT CHANGE UP (ref 80–100)
PHOSPHATE SERPL-MCNC: 2.9 MG/DL — SIGNIFICANT CHANGE UP (ref 2.5–4.5)
PLATELET # BLD AUTO: 157 K/UL — SIGNIFICANT CHANGE UP (ref 150–400)
POTASSIUM SERPL-MCNC: 3.7 MMOL/L — SIGNIFICANT CHANGE UP (ref 3.5–5.3)
POTASSIUM SERPL-SCNC: 3.7 MMOL/L — SIGNIFICANT CHANGE UP (ref 3.5–5.3)
PROT SERPL-MCNC: 6.1 G/DL — SIGNIFICANT CHANGE UP (ref 6–8.3)
RBC # BLD: 4.5 M/UL — SIGNIFICANT CHANGE UP (ref 4.2–5.8)
RBC # FLD: 14.5 % — SIGNIFICANT CHANGE UP (ref 10.3–14.5)
SODIUM SERPL-SCNC: 137 MMOL/L — SIGNIFICANT CHANGE UP (ref 135–145)
SPECIMEN SOURCE: SIGNIFICANT CHANGE UP
WBC # BLD: 7.58 K/UL — SIGNIFICANT CHANGE UP (ref 3.8–10.5)
WBC # FLD AUTO: 7.58 K/UL — SIGNIFICANT CHANGE UP (ref 3.8–10.5)

## 2019-01-09 PROCEDURE — 99232 SBSQ HOSP IP/OBS MODERATE 35: CPT

## 2019-01-09 PROCEDURE — 85027 COMPLETE CBC AUTOMATED: CPT

## 2019-01-09 PROCEDURE — 80048 BASIC METABOLIC PNL TOTAL CA: CPT

## 2019-01-09 PROCEDURE — 80053 COMPREHEN METABOLIC PANEL: CPT

## 2019-01-09 PROCEDURE — 83735 ASSAY OF MAGNESIUM: CPT

## 2019-01-09 PROCEDURE — 93005 ELECTROCARDIOGRAM TRACING: CPT

## 2019-01-09 PROCEDURE — 87086 URINE CULTURE/COLONY COUNT: CPT

## 2019-01-09 PROCEDURE — 74177 CT ABD & PELVIS W/CONTRAST: CPT

## 2019-01-09 PROCEDURE — 86900 BLOOD TYPING SEROLOGIC ABO: CPT

## 2019-01-09 PROCEDURE — 83605 ASSAY OF LACTIC ACID: CPT

## 2019-01-09 PROCEDURE — 82962 GLUCOSE BLOOD TEST: CPT

## 2019-01-09 PROCEDURE — 85610 PROTHROMBIN TIME: CPT

## 2019-01-09 PROCEDURE — A9537: CPT

## 2019-01-09 PROCEDURE — C1887: CPT

## 2019-01-09 PROCEDURE — 84100 ASSAY OF PHOSPHORUS: CPT

## 2019-01-09 PROCEDURE — 85730 THROMBOPLASTIN TIME PARTIAL: CPT

## 2019-01-09 PROCEDURE — 96375 TX/PRO/DX INJ NEW DRUG ADDON: CPT

## 2019-01-09 PROCEDURE — 86850 RBC ANTIBODY SCREEN: CPT

## 2019-01-09 PROCEDURE — 78226 HEPATOBILIARY SYSTEM IMAGING: CPT

## 2019-01-09 PROCEDURE — C1769: CPT

## 2019-01-09 PROCEDURE — 47531 INJECTION FOR CHOLANGIOGRAM: CPT

## 2019-01-09 PROCEDURE — 96374 THER/PROPH/DIAG INJ IV PUSH: CPT | Mod: XU

## 2019-01-09 PROCEDURE — 71045 X-RAY EXAM CHEST 1 VIEW: CPT

## 2019-01-09 PROCEDURE — 86901 BLOOD TYPING SEROLOGIC RH(D): CPT

## 2019-01-09 PROCEDURE — 80076 HEPATIC FUNCTION PANEL: CPT

## 2019-01-09 PROCEDURE — 99285 EMERGENCY DEPT VISIT HI MDM: CPT | Mod: 25

## 2019-01-09 PROCEDURE — 87040 BLOOD CULTURE FOR BACTERIA: CPT

## 2019-01-09 PROCEDURE — 81001 URINALYSIS AUTO W/SCOPE: CPT

## 2019-01-09 RX ORDER — ACETAMINOPHEN 500 MG
2 TABLET ORAL
Qty: 0 | Refills: 0 | COMMUNITY
Start: 2019-01-09

## 2019-01-09 RX ORDER — POTASSIUM CHLORIDE 20 MEQ
20 PACKET (EA) ORAL ONCE
Qty: 0 | Refills: 0 | Status: COMPLETED | OUTPATIENT
Start: 2019-01-09 | End: 2019-01-09

## 2019-01-09 RX ADMIN — Medication 25 MILLIGRAM(S): at 06:09

## 2019-01-09 RX ADMIN — Medication 20 MILLIEQUIVALENT(S): at 11:33

## 2019-01-09 RX ADMIN — PIPERACILLIN AND TAZOBACTAM 25 GRAM(S): 4; .5 INJECTION, POWDER, LYOPHILIZED, FOR SOLUTION INTRAVENOUS at 10:35

## 2019-01-09 RX ADMIN — Medication 81 MILLIGRAM(S): at 11:30

## 2019-01-09 RX ADMIN — PIPERACILLIN AND TAZOBACTAM 25 GRAM(S): 4; .5 INJECTION, POWDER, LYOPHILIZED, FOR SOLUTION INTRAVENOUS at 01:40

## 2019-01-09 RX ADMIN — PANTOPRAZOLE SODIUM 40 MILLIGRAM(S): 20 TABLET, DELAYED RELEASE ORAL at 06:09

## 2019-01-09 RX ADMIN — ENOXAPARIN SODIUM 40 MILLIGRAM(S): 100 INJECTION SUBCUTANEOUS at 11:35

## 2019-01-09 RX ADMIN — LOSARTAN POTASSIUM 25 MILLIGRAM(S): 100 TABLET, FILM COATED ORAL at 06:09

## 2019-01-09 NOTE — PROGRESS NOTE ADULT - REASON FOR ADMISSION
dislodged percutaneous cholecystostomy tube

## 2019-01-09 NOTE — DISCHARGE NOTE ADULT - ADDITIONAL INSTRUCTIONS
WOUND CARE: Please keep incision clean and dry. Please do not scrub or rub incisions. Do not use lotion or powder on incisions.   BATHING: Please do not submerge wound underwater. You may shower and/or sponge bathe.  ACTIVITY: You may return to your usual level of physical activity, as tolerated.  DIET: Return to your usual diet.  NOTIFY YOUR SURGEON IF: You have any bleeding that does not stop, any pus draining from your wound(s), any fever (over 100.4 F) or chills, persistent nausea/vomiting, persistent diarrhea, or if your pain is not controlled on your discharge pain medications.  FOLLOW-UP: Please follow-up with your surgeon within 2-3 weeks following discharge-please call to schedule an appointment. Please follow up with your PCP within 1 week.

## 2019-01-09 NOTE — DISCHARGE NOTE ADULT - PLAN OF CARE
To return to daily living activity prior to hospitalization. Please follow up with Dr. Graf within the next 2-3 weeks. Please call to schedule an appointment.  Please follow up with your PCP this week.  Return to the ED with any increasing pain, fever, chills, or any concerning symptom.

## 2019-01-09 NOTE — DISCHARGE NOTE ADULT - CARE PROVIDER_API CALL
Adonay Graf), Surgery; Surgical Critical Care  [address] [place] [address]  Phone: [phone]  Fax: [phone]

## 2019-01-09 NOTE — DISCHARGE NOTE ADULT - PATIENT PORTAL LINK FT
You can access the [facility] Patient Portal, offered by [facility], by registering with the following website: [email or web address]

## 2019-01-09 NOTE — PROGRESS NOTE ADULT - ASSESSMENT
73 year old male with recent history of acute cholecystitis requiring perc cholecystostomy tube placement for drainage, here now for dislodged tube, no evidence of acute cholecystitis.    - Pain control  - Regular diet  - Abx  - follow up labs, LFTs

## 2019-01-09 NOTE — DISCHARGE NOTE ADULT - MEDICATION SUMMARY - MEDICATIONS TO TAKE
I will START or STAY ON the medications listed below when I get home from the hospital:    aspirin 81 mg oral tablet  -- 1 tab(s) by mouth once a day  -- Indication: For home med    acetaminophen 325 mg oral tablet  -- 2 tab(s) by mouth every 6 hours, As needed, Mild Pain (1 - 3)  -- Indication: For Pain    losartan 25 mg oral tablet  -- 1 tab(s) by mouth once a day  -- Indication: For home med    atorvastatin 80 mg oral tablet  -- 1 tab(s) by mouth once a day  -- Indication: For home med    Plavix 75 mg oral tablet  -- 1 tab(s) by mouth once a day  -- Indication: For home med    metoprolol succinate 25 mg oral tablet, extended release  -- 1 tab(s) by mouth once a day  -- Indication: For home med    cilostazol 100 mg oral tablet  -- 1 tab(s) by mouth 2 times a day  -- Indication: For home med

## 2019-01-09 NOTE — PROGRESS NOTE ADULT - SUBJECTIVE AND OBJECTIVE BOX
Patient seen and examined  c/o bilateral lower abdominal pain  Tolerating PO  afebrile x 2 days  vitals stable  not tachycardic  non-toxic appearing  abd - nontender, nondistended, soft    WBC=WNL x 2 days   LFTs = WNL x 2 days  HIDA NOT consistent with acute huy  CT-abd / pelvis without reason for lower abdominal pain  urine and blood cultures negative to date    I have offered cholecystectomy on this admission, although I have discussed that it would be better to wait 6-8 weeks from the time of admission for maximal resolution of hugo-huy inflammation.  In addition, pain is atypical for cholecystitis and HIDA negative.    Patient and daughter would like to go home.  I have advised them of the need to return to ED for increasing pain or signs of sepsis.

## 2019-01-09 NOTE — DISCHARGE NOTE ADULT - CARE PLAN
Principal Discharge DX:	Right upper quadrant abdominal pain  Goal:	To return to daily living activity prior to hospitalization.  Assessment and plan of treatment:	Please follow up with Dr. Graf within the next 2-3 weeks. Please call to schedule an appointment.  Please follow up with your PCP this week.  Return to the ED with any increasing pain, fever, chills, or any concerning symptom.

## 2019-01-09 NOTE — DISCHARGE NOTE ADULT - HOSPITAL COURSE
73y M with history of appendectomy, CAD with CABG x4, HTN, DM, and HLD who presented to the ED for a dislodge percutaneous cholecystostomy tube. The patient had just had a tube check earlier in the day which demonstrated the tube to be partially dislodged. The patient did not have drainage via the tube for 48 hours. The patient denied any fever, rigors, or new abdominal pain. The patient underwent a CT abdomen which showed a contracted, thick wall gallbladder. The patient was noted to be afebrile and his WBC was normal. A HIDA scan was performed which did not reveal acute cholecystitis or a bile leak. Interventional radiology was consulted and they recommended no further intervention at this time given normal WBC and patient without fever or RUQ pain. The patient's diet was advanced to a regular diet and he tolerated well. The patient and his daughter discussed the possibility of cholecystectomy this admission, however it was decided to wait 6-8 weeks.     During the hospital course, patient had lab work performed on a daily basis. Prior to discharge lab work was noted to be within normal limits. A normal WBC was noted and the patient's HCT was within normal limits. Vitals were checked on a four hour basis, and the patient was noted to be normotensive and afebrile upon discharge. Patient had a normal heart rate and adequate oxygen saturations. SOB/FLORES was denied. Pain medication was given prior to discharge with no allergic reaction, and reported adequate pain control.    At the time of discharge, the patient was hemodynamically stable, was tolerating PO diet, was voiding urine and had normal GI function, was ambulating, and was comfortable with adequate pain control. The patient was instructed to follow up with Dr. Graf within 3 weeks after discharge from the hospital. The patient felt comfortable with discharge. The patient had no other issues.

## 2019-01-09 NOTE — PROGRESS NOTE ADULT - SUBJECTIVE AND OBJECTIVE BOX
Surgery Progress Note    S:     Patient seen and examined. Complaining of lower abdominal pain, RUQ pain improving. Tolerating regular diet, no n/v.     O:    Vital Signs Last 24 Hrs  T(C): 37.3 (09 Jan 2019 06:13), Max: 37.3 (09 Jan 2019 06:13)  T(F): 99.1 (09 Jan 2019 06:13), Max: 99.1 (09 Jan 2019 06:13)  HR: 73 (09 Jan 2019 06:13) (66 - 73)  BP: 125/79 (09 Jan 2019 06:13) (103/66 - 125/79)  BP(mean): --  RR: 18 (09 Jan 2019 06:13) (18 - 18)  SpO2: 95% (09 Jan 2019 06:13) (94% - 96%)    Physical Exam:  Gen: NAD  Resp: Unlabored breathing  Abd: soft, NT, ND, no rebound or guarding  Ext: WWP  Skin: No rashes    I&O's Detail    08 Jan 2019 07:01  -  09 Jan 2019 07:00  --------------------------------------------------------  IN:    Oral Fluid: 420 mL    Solution: 100 mL  Total IN: 520 mL    OUT:  Total OUT: 0 mL    Total NET: 520 mL          MEDICATIONS  (STANDING):  aspirin enteric coated 81 milliGRAM(s) Oral daily  atorvastatin 80 milliGRAM(s) Oral at bedtime  enoxaparin Injectable 40 milliGRAM(s) SubCutaneous daily  losartan 25 milliGRAM(s) Oral daily  metoprolol succinate ER 25 milliGRAM(s) Oral daily  pantoprazole    Tablet 40 milliGRAM(s) Oral before breakfast  piperacillin/tazobactam IVPB. 3.375 Gram(s) IV Intermittent every 8 hours    MEDICATIONS  (PRN):  acetaminophen   Tablet .. 650 milliGRAM(s) Oral every 6 hours PRN Mild Pain (1 - 3)      Labs:                          13.8   8.8   )-----------( 162      ( 08 Jan 2019 11:21 )             40.2       01-08    140  |  109<H>  |  8   ----------------------------<  112<H>  5.4<H>   |  23  |  1.25    Ca    8.8      08 Jan 2019 11:21    TPro  6.4  /  Alb  3.2<L>  /  TBili  0.5  /  DBili  0.1  /  AST  20  /  ALT  23  /  AlkPhos  92  01-08      Radiology:  < from: [facility] Hepatobiliary Imaging (01.08.19 @ 14:51) >  EXAM:  [facility] HEPATOBILIARY IMG                                PROCEDURE DATE:  01/08/2019          INTERPRETATION:  CLINICAL STATEMENT: 73-year-old male with previous   episode of cholecystitis status post cholecystostomy and interval removal   of cholecystostomy tube.    RADIOPHARMACEUTICAL: 3.3 mCi and 3.0 mCi Tc-99m-Mebrofenin, I.V.; 2 doses    TECHNIQUE:  Dynamic images of the anterior abdomen were obtained for 1   hour following injection of radiotracer. Morphine 4 mg I.V. and a second   doseof radiotracer were administered at 1 hour. Dynamic imaging was   continued for 1 hour followed by static images of the abdomen in the   right lateral and right anterior oblique views immediately thereafter.    FINDINGS: There is prompt, homogeneous uptake of radiotracer by the   hepatocytes. Activity is first seen in the bowel at 20 minutes. The   gallbladder is not visualized in the first hour of imaging, but was seen   the minutes after the administration of morphine. There is good clearance   of activity from the liver at the end of the study.    IMPRESSION: Normal morphine-augmented hepatobiliary scan.    No evidence of acute cholecystitis.    < end of copied text >

## 2019-01-10 DIAGNOSIS — T85.520A DISPLACEMENT OF BILE DUCT PROSTHESIS, INITIAL ENCOUNTER: ICD-10-CM

## 2019-01-12 LAB
CULTURE RESULTS: SIGNIFICANT CHANGE UP
CULTURE RESULTS: SIGNIFICANT CHANGE UP
SPECIMEN SOURCE: SIGNIFICANT CHANGE UP
SPECIMEN SOURCE: SIGNIFICANT CHANGE UP

## 2019-04-16 NOTE — ED PROVIDER NOTE - CARE PLAN
[facility] in [place] calling and states patient's insurance will not cover the 5mg 1-2 times per night but will cover 10mg 1/2 to 1 tab per night and is much more cost effective.  Please call to discuss and advise.  Pharmacy hours are 9am-9am MST (2 hours behind) and fax is [phone].       Principal Discharge DX:	Right upper quadrant abdominal pain  Goal:	due to leakage associated with the removal of perc huy tube  Secondary Diagnosis:	Fever in other diseases

## 2019-04-23 NOTE — ED ADULT TRIAGE NOTE - PRO INTERPRETER NEED 2
Anesthesia PreOp Note    HPI:     Pedro Arteaga is a 60year old female who presents for preoperative consultation requested by: Anabella Pittman MD    Date of Surgery: 4/23/2019    Procedure(s):  KNEE TOTAL REPLACEMENT  Indication: degenerative joint d MD LIZ Last Rate: 20 mL/hr at 04/23/19 0611   scopolamine (TRANSDERM-SCOP) patch 1 patch Transdermal Once Lul Dial, NP 1 patch at 04/23/19 1966   ceFAZolin sodium (ANCEF/KEFZOL) 2 GM/20ML premix IV syringe 2 g 2 g Intravenous Once Lul Dial Concerns:        Not on file    Social History Narrative      Not on file      Available pre-op labs reviewed. Lab Results   Component Value Date    INR 1.06 04/11/2019       Vital Signs: Body mass index is 32.36 kg/m².    height is 1.676 m (5' 6\") PATRICIA  4/23/2019 6:27 AM English

## 2021-07-23 NOTE — PATIENT PROFILE ADULT - HOW PATIENT ADDRESSED, PROFILE
Company Name: [employer]  Job Title:  Operations  Length of Employment:  3 years  DOI: 23 July 2021   Description of Incident:  Face was exposed to gas while looking for gas leak.  Location of Injury with Pain Level: Left Side of face. Pain: 0   [place]

## 2021-08-20 NOTE — CONSULT NOTE ADULT - I WAS PHYSICALLY PRESENT FOR THE KEY PORTIONS OF THE EVALUATION AND MANAGEMENT (E/M) SERVICE PROVIDED.  I AGREE WITH THE ABOVE HISTORY, PHYSICAL, AND PLAN WHICH I HAVE REVIEWED AND EDITED WHERE APPROPRIATE
Per discussion with Critical Care, to start TF today  Pt intubated  Recommendations:   Start Jevity 1 2 @ 20 ml/hr  Increase 10 ml q 8 hrs until at goal rate  TF goal rate is Jevity 1 2 @ 40 ml/hr  1 packet No Carb ProSource  Water flushes per Critical Care  TF + Diprivan provides: 1555 kcal, 68gm protein, 775 ml total free water 
Statement Selected

## 2023-10-17 NOTE — ED PROVIDER NOTE - AGGRAVATING FACTORS
Holter ePatch Instructions    On 10/17/23 you had a Holter ePatch applied.  The sensor is to be worn for  1 week . You may remove the sensor at 9:30 am on 10/24/2023.  The sensor acquires your ECG signals and stores them internally while you go about your normal daily activities.  Your physician will review the recordings and correlate them with any symptoms recorded in your diary.    Daily Use and Operation    DO  Follow your normal routine.  Don't avoid stress, work, or exercise.  It is important to record your heartbeat during your normal daily activities.  Try sleeping on your back.  While wearing monitor stay away from magnets, electric blankets, metal detectors, and high-voltage areas such as power lines.  These things can affect the recording.  Call the clinic if the sensor disconnects or displays a red light.  Microwave oven use is OK.  You may shower normally; however for optimal results, avoid spraying water directly onto the sensor. The ePatch is water resistant, NOT waterproof.    DO NOT  Bathe or swim while wearing the monitor. The ePatch is water resistant, NOT waterproof. It should not be submerged in water.  Remove recorder before the monitoring period is complete.  Have an MRI or Nuclear Medicine procedure while wearing monitor.    Patient Diary  Document any symptoms you feel in the patient diary such as: fatigue, dizziness, chest pain, skipped heart beats/palpitations, lightheadedness or shortness of breath. Be sure to note what your were doing at the time of the symptoms.  Write in the time of day for each entry you make.    Returning Holter Monitor  At completion of monitoring period. Carefully remove the Holter ePatch from your body and return the sensor along with diary to clinic for processing.  Follow up with your physician for results.   
none